# Patient Record
Sex: FEMALE | Race: WHITE | Employment: OTHER | URBAN - METROPOLITAN AREA
[De-identification: names, ages, dates, MRNs, and addresses within clinical notes are randomized per-mention and may not be internally consistent; named-entity substitution may affect disease eponyms.]

---

## 2017-02-21 ENCOUNTER — HOSPITAL ENCOUNTER (OUTPATIENT)
Dept: GENERAL RADIOLOGY | Age: 67
Discharge: HOME | End: 2017-02-21
Payer: MEDICARE

## 2017-02-21 DIAGNOSIS — M48.02 CERVICAL SPINAL STENOSIS: ICD-10-CM

## 2017-02-21 PROBLEM — F40.240 CLAUSTROPHOBIA: Status: ACTIVE | Noted: 2017-02-21

## 2017-02-21 PROCEDURE — 72040 X-RAY EXAM NECK SPINE 2-3 VW: CPT

## 2017-03-09 PROBLEM — Z78.0 POST-MENOPAUSE: Status: ACTIVE | Noted: 2017-03-09

## 2017-03-09 PROBLEM — R73.9 HYPERGLYCEMIA: Status: ACTIVE | Noted: 2017-03-09

## 2017-03-09 PROBLEM — R63.4 WEIGHT LOSS: Status: ACTIVE | Noted: 2017-03-09

## 2017-03-09 PROBLEM — M54.50 CHRONIC LOW BACK PAIN: Status: ACTIVE | Noted: 2017-03-09

## 2017-03-09 PROBLEM — G89.29 CHRONIC LOW BACK PAIN: Status: ACTIVE | Noted: 2017-03-09

## 2017-06-27 PROBLEM — R05.9 COUGH: Status: ACTIVE | Noted: 2017-06-27

## 2017-06-27 PROBLEM — M25.531 ARTHRALGIA OF RIGHT WRIST: Status: ACTIVE | Noted: 2017-06-27

## 2017-08-03 ENCOUNTER — HOSPITAL ENCOUNTER (OUTPATIENT)
Dept: MAMMOGRAPHY | Age: 67
Discharge: HOME | End: 2017-08-03
Attending: INTERNAL MEDICINE
Payer: MEDICARE

## 2017-08-03 DIAGNOSIS — Z78.0 POST-MENOPAUSE: ICD-10-CM

## 2017-08-03 DIAGNOSIS — Z12.31 ENCOUNTER FOR SCREENING MAMMOGRAM FOR MALIGNANT NEOPLASM OF BREAST: ICD-10-CM

## 2017-08-03 PROCEDURE — 77067 SCR MAMMO BI INCL CAD: CPT

## 2017-08-03 PROCEDURE — 77080 DXA BONE DENSITY AXIAL: CPT

## 2017-08-04 PROBLEM — Z79.01 ANTICOAGULANT LONG-TERM USE: Status: ACTIVE | Noted: 2017-08-04

## 2017-09-14 PROBLEM — M79.645 PAIN OF LEFT THUMB: Status: ACTIVE | Noted: 2017-09-14

## 2018-01-22 ENCOUNTER — HOSPITAL ENCOUNTER (OUTPATIENT)
Dept: LAB | Age: 68
Discharge: HOME | End: 2018-01-22
Payer: MEDICARE

## 2018-01-22 DIAGNOSIS — I48.0 PAROXYSMAL ATRIAL FIBRILLATION (HCC): ICD-10-CM

## 2018-01-22 LAB — DIGOXIN SERPL-MCNC: 1.3 NG/ML (ref 0.9–2.1)

## 2018-01-22 PROCEDURE — 80162 ASSAY OF DIGOXIN TOTAL: CPT | Performed by: INTERNAL MEDICINE

## 2018-01-22 PROCEDURE — 36415 COLL VENOUS BLD VENIPUNCTURE: CPT | Performed by: INTERNAL MEDICINE

## 2018-02-05 PROBLEM — N90.4 LICHEN SCLEROSUS ET ATROPHICUS OF THE VULVA: Status: ACTIVE | Noted: 2018-02-05

## 2018-02-14 ENCOUNTER — APPOINTMENT (RX ONLY)
Dept: URBAN - METROPOLITAN AREA CLINIC 349 | Facility: CLINIC | Age: 68
Setting detail: DERMATOLOGY
End: 2018-02-14

## 2018-02-14 DIAGNOSIS — L82.0 INFLAMED SEBORRHEIC KERATOSIS: ICD-10-CM

## 2018-02-14 DIAGNOSIS — D22 MELANOCYTIC NEVI: ICD-10-CM

## 2018-02-14 DIAGNOSIS — L82.1 OTHER SEBORRHEIC KERATOSIS: ICD-10-CM

## 2018-02-14 DIAGNOSIS — Z85.828 PERSONAL HISTORY OF OTHER MALIGNANT NEOPLASM OF SKIN: ICD-10-CM

## 2018-02-14 PROBLEM — C44.612 BASAL CELL CARCINOMA OF SKIN OF RIGHT UPPER LIMB, INCLUDING SHOULDER: Status: ACTIVE | Noted: 2018-02-14

## 2018-02-14 PROBLEM — I48.91 UNSPECIFIED ATRIAL FIBRILLATION: Status: ACTIVE | Noted: 2018-02-14

## 2018-02-14 PROBLEM — D22.72 MELANOCYTIC NEVI OF LEFT LOWER LIMB, INCLUDING HIP: Status: ACTIVE | Noted: 2018-02-14

## 2018-02-14 PROBLEM — L29.8 OTHER PRURITUS: Status: ACTIVE | Noted: 2018-02-14

## 2018-02-14 PROBLEM — D22.61 MELANOCYTIC NEVI OF RIGHT UPPER LIMB, INCLUDING SHOULDER: Status: ACTIVE | Noted: 2018-02-14

## 2018-02-14 PROBLEM — D22.62 MELANOCYTIC NEVI OF LEFT UPPER LIMB, INCLUDING SHOULDER: Status: ACTIVE | Noted: 2018-02-14

## 2018-02-14 PROBLEM — D22.5 MELANOCYTIC NEVI OF TRUNK: Status: ACTIVE | Noted: 2018-02-14

## 2018-02-14 PROBLEM — D22.71 MELANOCYTIC NEVI OF RIGHT LOWER LIMB, INCLUDING HIP: Status: ACTIVE | Noted: 2018-02-14

## 2018-02-14 PROCEDURE — 88305 TISSUE EXAM BY PATHOLOGIST: CPT

## 2018-02-14 PROCEDURE — ? BIOPSY BY SHAVE METHOD

## 2018-02-14 PROCEDURE — 17110 DESTRUCTION B9 LES UP TO 14: CPT

## 2018-02-14 PROCEDURE — ? PATHOLOGY BILLING

## 2018-02-14 PROCEDURE — ? LIQUID NITROGEN

## 2018-02-14 PROCEDURE — A4550 SURGICAL TRAYS: HCPCS

## 2018-02-14 PROCEDURE — 11100: CPT | Mod: 59

## 2018-02-14 PROCEDURE — ? COUNSELING

## 2018-02-14 PROCEDURE — ? BODY PHOTOGRAPHY

## 2018-02-14 PROCEDURE — 99202 OFFICE O/P NEW SF 15 MIN: CPT | Mod: 25

## 2018-02-14 PROCEDURE — ? OTHER

## 2018-02-14 ASSESSMENT — LOCATION ZONE DERM
LOCATION ZONE: LEG
LOCATION ZONE: HAND
LOCATION ZONE: ARM
LOCATION ZONE: FACE
LOCATION ZONE: TRUNK

## 2018-02-14 ASSESSMENT — LOCATION DETAILED DESCRIPTION DERM
LOCATION DETAILED: LEFT SUPERIOR LATERAL MIDBACK
LOCATION DETAILED: RIGHT DISTAL POSTERIOR THIGH
LOCATION DETAILED: LEFT PROXIMAL DORSAL FOREARM
LOCATION DETAILED: LEFT SUPERIOR LATERAL LOWER BACK
LOCATION DETAILED: LEFT DISTAL POSTERIOR THIGH
LOCATION DETAILED: LEFT SUPERIOR MEDIAL UPPER BACK
LOCATION DETAILED: RIGHT MID-UPPER BACK
LOCATION DETAILED: LEFT SUPERIOR MEDIAL LOWER BACK
LOCATION DETAILED: RIGHT CENTRAL TEMPLE
LOCATION DETAILED: RIGHT DISTAL DORSAL FOREARM
LOCATION DETAILED: LEFT MID-UPPER BACK
LOCATION DETAILED: LEFT INFERIOR MEDIAL UPPER BACK
LOCATION DETAILED: LEFT RADIAL DORSAL HAND

## 2018-02-14 ASSESSMENT — LOCATION SIMPLE DESCRIPTION DERM
LOCATION SIMPLE: LEFT POSTERIOR THIGH
LOCATION SIMPLE: LEFT FOREARM
LOCATION SIMPLE: LEFT UPPER BACK
LOCATION SIMPLE: RIGHT TEMPLE
LOCATION SIMPLE: RIGHT FOREARM
LOCATION SIMPLE: LEFT HAND
LOCATION SIMPLE: LEFT LOWER BACK
LOCATION SIMPLE: RIGHT UPPER BACK
LOCATION SIMPLE: RIGHT POSTERIOR THIGH

## 2018-02-14 ASSESSMENT — PAIN INTENSITY VAS: HOW INTENSE IS YOUR PAIN 0 BEING NO PAIN, 10 BEING THE MOST SEVERE PAIN POSSIBLE?: NO PAIN

## 2018-02-14 NOTE — PROCEDURE: BODY PHOTOGRAPHY
Consent: Written consent obtained, risks reviewed for whole body photography. Patient understands that photograph costs may not be covered by insurance, and patient is ultimately responsible for payment.
Was The Entire Body Photographed (Cannot Bill Unless Entire Body Photographed)?: No
Reason For Photography: The patient is obtaining body photography to observe existing suspicious moles and or monitor for the appearance of any new lesions.
Number Of Photographs (Optional- Will Not Render If 0): 1
Detail Level: Detailed
Whole Body Statement: The whole body was photographed today.

## 2018-02-14 NOTE — HPI: NON-MELANOMA SKIN CANCER F/U (HISTORY OF NMSC)
How Many Skin Cancers Have You Had?: one
What Is The Reason For Today's Visit?: History of Non-Melanoma Skin Cancer
Additional History: History provided by patient

## 2018-02-14 NOTE — PROCEDURE: OTHER
Note Text (......Xxx Chief Complaint.): This diagnosis correlates with the
Detail Level: Detailed
Other (Free Text): History provided by patient

## 2018-02-14 NOTE — PROCEDURE: BIOPSY BY SHAVE METHOD
Dressing: bandage
Bill 55911 For Specimen Handling/Conveyance To Laboratory?: no
Biopsy Method: 15 blade- incisional biopsy technique
Hemostasis: Aluminum Chloride
Billing Type: Third-Party Bill
Type Of Destruction Used: Curettage
X Size Of Lesion In Cm: 0
Accession #: [id number]
Additional Anesthesia Volume In Cc (Will Not Render If 0): 1.5
Notification Instructions: Patient will be notified of biopsy results. However, patient instructed to call the office if not contacted within 2 weeks. After the procedure, the patient was oriented to person, place, and time. Patient denied feeling dizzy, queasy, and and declined further observation after initial 5 minute observation time.
Detail Level: Detailed
Cryotherapy Text: The wound bed was treated with cryotherapy after the biopsy was performed.
Silver Nitrate Text: The wound bed was treated with silver nitrate after the biopsy was performed.
Post-Care Instructions: I reviewed with the patient in detail post-care instructions. Patient is to keep the biopsy site dry overnight, and then apply Vaseline  daily until healed. Patient may apply hydrogen peroxide soaks to remove any crusting. After the procedure, the patient was oriented to person, place, and time. Patient denied feeling dizzy, queasy, and and declined further observation after initial 5 minute observation time.
Wound Care: Vaseline
Biopsy Type: H and E
Bill For Surgical Tray: yes
Electrodesiccation And Curettage Text: The wound bed was treated with electrodesiccation and curettage after the biopsy was performed.
Consent: Written consent was obtained and risks were reviewed including but not limited to scarring, infection, bleeding, scabbing, incomplete removal, nerve damage and allergy to anesthesia.
Electrodesiccation Text: The wound bed was treated with electrodesiccation after the biopsy was performed.
Curettage Text: The wound bed was treated with curettage after the biopsy was performed.
Anesthesia Volume In Cc (Will Not Render If 0): 0.5
Anesthesia Type: 2% lidocaine with epinephrine and a 1:10 solution of 8.4% sodium bicarbonate

## 2018-02-14 NOTE — PROCEDURE: LIQUID NITROGEN
Add 52 Modifier (Optional): no
Medical Necessity Information: It is in your best interest to select a reason for this procedure from the list below. All of these items fulfill various CMS LCD requirements except the new and changing color options.
Detail Level: Detailed
Render Post-Care Instructions In Note?: yes
Post-Care Instructions: I reviewed with the patient in detail post-care instructions. Patient is to wear sunprotection, and avoid picking at any of the treated lesions. Pt may apply Vaseline to crusted or scabbing areas.
Medical Necessity Clause: This procedure was medically necessary because the lesions that were treated were:
Consent: The patient's consent was obtained including but not limited to risks of crusting, scabbing, blistering, scarring, darker or lighter pigmentary change, recurrence, incomplete removal and infection.

## 2018-03-01 ENCOUNTER — APPOINTMENT (RX ONLY)
Dept: URBAN - METROPOLITAN AREA CLINIC 349 | Facility: CLINIC | Age: 68
Setting detail: DERMATOLOGY
End: 2018-03-01

## 2018-03-01 PROBLEM — C44.612 BASAL CELL CARCINOMA OF SKIN OF RIGHT UPPER LIMB, INCLUDING SHOULDER: Status: ACTIVE | Noted: 2018-03-01

## 2018-03-01 PROBLEM — K21.9 GASTRO-ESOPHAGEAL REFLUX DISEASE WITHOUT ESOPHAGITIS: Status: ACTIVE | Noted: 2018-03-01

## 2018-03-01 PROBLEM — L85.3 XEROSIS CUTIS: Status: ACTIVE | Noted: 2018-03-01

## 2018-03-01 PROCEDURE — A4550 SURGICAL TRAYS: HCPCS

## 2018-03-01 PROCEDURE — 17262 DSTRJ MAL LES T/A/L 1.1-2.0: CPT

## 2018-03-01 PROCEDURE — ? CURETTAGE AND DESTRUCTION

## 2018-03-01 PROCEDURE — ? COUNSELING

## 2018-03-12 PROBLEM — C44.602 SKIN CANCER OF ARM, RIGHT: Status: ACTIVE | Noted: 2018-03-12

## 2018-03-12 PROBLEM — E78.2 ELEVATED TRIGLYCERIDES WITH HIGH CHOLESTEROL: Status: ACTIVE | Noted: 2018-03-12

## 2018-04-24 ENCOUNTER — HOSPITAL ENCOUNTER (OUTPATIENT)
Dept: GENERAL RADIOLOGY | Age: 68
Discharge: HOME | End: 2018-04-24
Payer: MEDICARE

## 2018-04-24 DIAGNOSIS — M54.2 NECK PAIN: ICD-10-CM

## 2018-04-24 PROBLEM — E66.01 SEVERE OBESITY (BMI 35.0-39.9) WITH COMORBIDITY (HCC): Status: ACTIVE | Noted: 2018-04-24

## 2018-04-24 PROCEDURE — 72040 X-RAY EXAM NECK SPINE 2-3 VW: CPT

## 2018-07-11 PROBLEM — H92.01 RIGHT EAR PAIN: Status: ACTIVE | Noted: 2018-07-11

## 2018-07-11 PROBLEM — R32 URINARY INCONTINENCE: Status: ACTIVE | Noted: 2018-07-11

## 2018-07-11 PROBLEM — E55.9 VITAMIN D DEFICIENCY: Status: ACTIVE | Noted: 2018-07-11

## 2018-07-11 PROBLEM — R31.9 HEMATURIA: Status: ACTIVE | Noted: 2018-07-11

## 2018-07-11 PROBLEM — R53.83 OTHER FATIGUE: Status: ACTIVE | Noted: 2018-07-11

## 2018-09-13 ENCOUNTER — HOSPITAL ENCOUNTER (OUTPATIENT)
Dept: MAMMOGRAPHY | Age: 68
Discharge: HOME | End: 2018-09-13
Attending: INTERNAL MEDICINE
Payer: MEDICARE

## 2018-09-13 DIAGNOSIS — Z12.31 VISIT FOR SCREENING MAMMOGRAM: ICD-10-CM

## 2018-09-13 PROCEDURE — 77067 SCR MAMMO BI INCL CAD: CPT

## 2018-09-14 PROBLEM — N32.81 OAB (OVERACTIVE BLADDER): Status: ACTIVE | Noted: 2018-09-14

## 2018-09-14 PROBLEM — N95.2 VAGINAL ATROPHY: Status: ACTIVE | Noted: 2018-09-14

## 2018-09-14 PROBLEM — R10.2 VAGINAL PAIN: Status: ACTIVE | Noted: 2018-09-14

## 2018-10-29 PROBLEM — M79.604 RIGHT LEG PAIN: Status: ACTIVE | Noted: 2018-10-29

## 2018-11-16 PROBLEM — M54.16 ACUTE LUMBAR RADICULOPATHY: Status: ACTIVE | Noted: 2018-11-16

## 2018-11-16 PROBLEM — Z23 FLU VACCINE NEED: Status: ACTIVE | Noted: 2018-11-16

## 2019-04-24 PROBLEM — M62.838 MUSCLE SPASM: Status: ACTIVE | Noted: 2019-04-24

## 2019-08-19 PROBLEM — M25.561 ACUTE PAIN OF RIGHT KNEE: Status: ACTIVE | Noted: 2019-08-19

## 2019-08-19 PROBLEM — M25.511 CHRONIC RIGHT SHOULDER PAIN: Status: ACTIVE | Noted: 2019-08-19

## 2019-08-19 PROBLEM — G89.29 CHRONIC RIGHT SHOULDER PAIN: Status: ACTIVE | Noted: 2019-08-19

## 2019-08-20 ENCOUNTER — HOSPITAL ENCOUNTER (OUTPATIENT)
Dept: GENERAL RADIOLOGY | Age: 69
Discharge: HOME | End: 2019-08-20
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
## 2019-08-20 DIAGNOSIS — R52 PAIN: ICD-10-CM

## 2019-08-20 DIAGNOSIS — T14.90XA INJURY: ICD-10-CM

## 2019-09-23 PROBLEM — Z23 FLU VACCINE NEED: Status: RESOLVED | Noted: 2018-11-16 | Resolved: 2019-09-23

## 2019-09-30 ENCOUNTER — HOSPITAL ENCOUNTER (OUTPATIENT)
Dept: MAMMOGRAPHY | Age: 69
Discharge: HOME | End: 2019-09-30
Attending: OBSTETRICS & GYNECOLOGY
Payer: MEDICARE

## 2019-09-30 DIAGNOSIS — N64.52 BLOODY DISCHARGE FROM RIGHT NIPPLE: ICD-10-CM

## 2019-09-30 PROCEDURE — 77066 DX MAMMO INCL CAD BI: CPT

## 2019-09-30 PROCEDURE — 76642 ULTRASOUND BREAST LIMITED: CPT

## 2019-10-18 ENCOUNTER — HOSPITAL ENCOUNTER (OUTPATIENT)
Dept: MRI IMAGING | Age: 69
Discharge: HOME | End: 2019-10-18
Attending: OBSTETRICS & GYNECOLOGY
Payer: MEDICARE

## 2019-10-18 DIAGNOSIS — N64.52 BLOODY DISCHARGE FROM RIGHT NIPPLE: ICD-10-CM

## 2019-10-18 PROCEDURE — 77049 MRI BREAST C-+ W/CAD BI: CPT

## 2019-10-18 PROCEDURE — 74011000258 HC RX REV CODE- 258: Performed by: OBSTETRICS & GYNECOLOGY

## 2019-10-18 PROCEDURE — 74011250636 HC RX REV CODE- 250/636: Performed by: OBSTETRICS & GYNECOLOGY

## 2019-10-18 PROCEDURE — A9575 INJ GADOTERATE MEGLUMI 0.1ML: HCPCS | Performed by: OBSTETRICS & GYNECOLOGY

## 2019-10-18 RX ORDER — GADOTERATE MEGLUMINE 376.9 MG/ML
21 INJECTION INTRAVENOUS
Status: COMPLETED | OUTPATIENT
Start: 2019-10-18 | End: 2019-10-18

## 2019-10-18 RX ORDER — SODIUM CHLORIDE 0.9 % (FLUSH) 0.9 %
10 SYRINGE (ML) INJECTION
Status: COMPLETED | OUTPATIENT
Start: 2019-10-18 | End: 2019-10-18

## 2019-10-18 RX ADMIN — Medication 10 ML: at 12:36

## 2019-10-18 RX ADMIN — GADOTERATE MEGLUMINE 21 ML: 376.9 INJECTION INTRAVENOUS at 12:36

## 2019-10-18 RX ADMIN — SODIUM CHLORIDE 100 ML: 900 INJECTION, SOLUTION INTRAVENOUS at 12:36

## 2019-11-07 PROBLEM — K22.4 ESOPHAGEAL SPASM: Status: ACTIVE | Noted: 2019-11-07

## 2019-12-30 ENCOUNTER — HOSPITAL ENCOUNTER (OUTPATIENT)
Dept: LAB | Age: 69
Discharge: HOME | End: 2019-12-30
Attending: INTERNAL MEDICINE
Payer: MEDICARE

## 2019-12-30 DIAGNOSIS — R07.89 OTHER CHEST PAIN: ICD-10-CM

## 2019-12-30 DIAGNOSIS — Z79.01 ANTICOAGULANT LONG-TERM USE: ICD-10-CM

## 2019-12-30 LAB
ANION GAP SERPL CALC-SCNC: 3 MMOL/L (ref 7–16)
BASOPHILS # BLD: 0.1 K/UL (ref 0–0.2)
BASOPHILS NFR BLD: 1 % (ref 0–2)
BUN SERPL-MCNC: 17 MG/DL (ref 8–23)
CALCIUM SERPL-MCNC: 8.8 MG/DL (ref 8.3–10.4)
CHLORIDE SERPL-SCNC: 108 MMOL/L (ref 98–107)
CO2 SERPL-SCNC: 32 MMOL/L (ref 21–32)
CREAT SERPL-MCNC: 0.9 MG/DL (ref 0.6–1)
DIFFERENTIAL METHOD BLD: NORMAL
EOSINOPHIL # BLD: 0.2 K/UL (ref 0–0.8)
EOSINOPHIL NFR BLD: 4 % (ref 0.5–7.8)
ERYTHROCYTE [DISTWIDTH] IN BLOOD BY AUTOMATED COUNT: 12.8 % (ref 11.9–14.6)
GLUCOSE SERPL-MCNC: 118 MG/DL (ref 65–100)
HCT VFR BLD AUTO: 45.2 % (ref 35.8–46.3)
HGB BLD-MCNC: 14.8 G/DL (ref 11.7–15.4)
IMM GRANULOCYTES # BLD AUTO: 0 K/UL (ref 0–0.5)
IMM GRANULOCYTES NFR BLD AUTO: 1 % (ref 0–5)
LYMPHOCYTES # BLD: 1.4 K/UL (ref 0.5–4.6)
LYMPHOCYTES NFR BLD: 26 % (ref 13–44)
MCH RBC QN AUTO: 30.2 PG (ref 26.1–32.9)
MCHC RBC AUTO-ENTMCNC: 32.7 G/DL (ref 31.4–35)
MCV RBC AUTO: 92.2 FL (ref 79.6–97.8)
MONOCYTES # BLD: 0.3 K/UL (ref 0.1–1.3)
MONOCYTES NFR BLD: 6 % (ref 4–12)
NEUTS SEG # BLD: 3.3 K/UL (ref 1.7–8.2)
NEUTS SEG NFR BLD: 62 % (ref 43–78)
NRBC # BLD: 0 K/UL (ref 0–0.2)
PLATELET # BLD AUTO: 188 K/UL (ref 150–450)
PMV BLD AUTO: 10.7 FL (ref 9.4–12.3)
POTASSIUM SERPL-SCNC: 4.2 MMOL/L (ref 3.5–5.1)
RBC # BLD AUTO: 4.9 M/UL (ref 4.05–5.2)
SODIUM SERPL-SCNC: 143 MMOL/L (ref 136–145)
WBC # BLD AUTO: 5.4 K/UL (ref 4.3–11.1)

## 2019-12-30 PROCEDURE — 36415 COLL VENOUS BLD VENIPUNCTURE: CPT

## 2019-12-30 PROCEDURE — 80048 BASIC METABOLIC PNL TOTAL CA: CPT

## 2019-12-30 PROCEDURE — 85025 COMPLETE CBC W/AUTO DIFF WBC: CPT

## 2020-01-07 RX ORDER — ASPIRIN 81 MG/1
81 TABLET ORAL DAILY
COMMUNITY
End: 2020-05-12

## 2020-01-07 NOTE — PROGRESS NOTES
Called to pre-assess for [facility] poss with Dr Ross Law , Scheduled 1/8/20. No answer & message left.

## 2020-01-07 NOTE — PROGRESS NOTES
Patient pre-assessment complete for [facility] poss with Dr Maryam Singh scheduled for 20 at 8am, arrival time 6am. Patient verified using . Patient instructed to bring all home medications in labeled bottles on the day of procedure. NPO status reinforced. Patient informed to take a full dose aspirin 325mg  or 81 mg x 4 on the day of procedure. Patient instructed to HOLD eliquis (last dose 20). Instructed they can take all other medications excluding vitamins & supplements. Patient verbalizes understanding of all instructions & denies any questions at this time.

## 2020-01-08 ENCOUNTER — HOSPITAL ENCOUNTER (OUTPATIENT)
Dept: CARDIAC CATH/INVASIVE PROCEDURES | Age: 70
Discharge: HOME | End: 2020-01-08
Attending: INTERNAL MEDICINE | Admitting: INTERNAL MEDICINE
Payer: MEDICARE

## 2020-01-08 VITALS
HEART RATE: 58 BPM | BODY MASS INDEX: 37.04 KG/M2 | SYSTOLIC BLOOD PRESSURE: 127 MMHG | WEIGHT: 236 LBS | DIASTOLIC BLOOD PRESSURE: 63 MMHG | RESPIRATION RATE: 18 BRPM | HEIGHT: 67 IN | OXYGEN SATURATION: 95 %

## 2020-01-08 LAB
ATRIAL RATE: 57 BPM
CALCULATED P AXIS, ECG09: 46 DEGREES
CALCULATED R AXIS, ECG10: 36 DEGREES
CALCULATED T AXIS, ECG11: 57 DEGREES
DIAGNOSIS, 93000: NORMAL
P-R INTERVAL, ECG05: 162 MS
Q-T INTERVAL, ECG07: 414 MS
QRS DURATION, ECG06: 98 MS
QTC CALCULATION (BEZET), ECG08: 402 MS
VENTRICULAR RATE, ECG03: 57 BPM

## 2020-01-08 PROCEDURE — 93005 ELECTROCARDIOGRAM TRACING: CPT | Performed by: INTERNAL MEDICINE

## 2020-01-08 PROCEDURE — 74011636320 HC RX REV CODE- 636/320: Performed by: INTERNAL MEDICINE

## 2020-01-08 PROCEDURE — 99152 MOD SED SAME PHYS/QHP 5/>YRS: CPT

## 2020-01-08 PROCEDURE — C1894 INTRO/SHEATH, NON-LASER: HCPCS

## 2020-01-08 PROCEDURE — 74011250636 HC RX REV CODE- 250/636: Performed by: INTERNAL MEDICINE

## 2020-01-08 PROCEDURE — 74011000250 HC RX REV CODE- 250: Performed by: INTERNAL MEDICINE

## 2020-01-08 PROCEDURE — 77030029997 HC DEV COM RDL R BND TELE -B

## 2020-01-08 PROCEDURE — C1769 GUIDE WIRE: HCPCS

## 2020-01-08 PROCEDURE — 77030015766

## 2020-01-08 PROCEDURE — 77030016699 HC CATH ANGI DX INFN1 CARD -A

## 2020-01-08 PROCEDURE — 93458 L HRT ARTERY/VENTRICLE ANGIO: CPT

## 2020-01-08 RX ORDER — HEPARIN SODIUM 200 [USP'U]/100ML
3 INJECTION, SOLUTION INTRAVENOUS CONTINUOUS
Status: DISCONTINUED | OUTPATIENT
Start: 2020-01-08 | End: 2020-01-08 | Stop reason: HOSPADM

## 2020-01-08 RX ORDER — GUAIFENESIN 100 MG/5ML
81-324 LIQUID (ML) ORAL ONCE
Status: DISCONTINUED | OUTPATIENT
Start: 2020-01-08 | End: 2020-01-08 | Stop reason: HOSPADM

## 2020-01-08 RX ORDER — FENTANYL CITRATE 50 UG/ML
25-100 INJECTION, SOLUTION INTRAMUSCULAR; INTRAVENOUS
Status: DISCONTINUED | OUTPATIENT
Start: 2020-01-08 | End: 2020-01-08 | Stop reason: HOSPADM

## 2020-01-08 RX ORDER — LIDOCAINE HYDROCHLORIDE 10 MG/ML
5-40 INJECTION INFILTRATION; PERINEURAL
Status: DISCONTINUED | OUTPATIENT
Start: 2020-01-08 | End: 2020-01-08 | Stop reason: HOSPADM

## 2020-01-08 RX ORDER — MIDAZOLAM HYDROCHLORIDE 1 MG/ML
.5-5 INJECTION, SOLUTION INTRAMUSCULAR; INTRAVENOUS
Status: DISCONTINUED | OUTPATIENT
Start: 2020-01-08 | End: 2020-01-08 | Stop reason: HOSPADM

## 2020-01-08 RX ORDER — LIDOCAINE HYDROCHLORIDE 10 MG/ML
1-30 INJECTION, SOLUTION EPIDURAL; INFILTRATION; INTRACAUDAL; PERINEURAL ONCE
Status: COMPLETED | OUTPATIENT
Start: 2020-01-08 | End: 2020-01-08

## 2020-01-08 RX ORDER — SODIUM CHLORIDE 9 MG/ML
75 INJECTION, SOLUTION INTRAVENOUS CONTINUOUS
Status: DISCONTINUED | OUTPATIENT
Start: 2020-01-08 | End: 2020-01-08 | Stop reason: HOSPADM

## 2020-01-08 RX ADMIN — MIDAZOLAM 2 MG: 1 INJECTION INTRAMUSCULAR; INTRAVENOUS at 08:21

## 2020-01-08 RX ADMIN — HEPARIN SODIUM 2 ML: 10000 INJECTION INTRAVENOUS; SUBCUTANEOUS at 08:31

## 2020-01-08 RX ADMIN — FENTANYL CITRATE 50 MCG: 50 INJECTION, SOLUTION INTRAMUSCULAR; INTRAVENOUS at 08:21

## 2020-01-08 RX ADMIN — IOPAMIDOL 65 ML: 755 INJECTION, SOLUTION INTRAVENOUS at 08:32

## 2020-01-08 RX ADMIN — HEPARIN SODIUM 3 ML/HR: 5000 INJECTION, SOLUTION INTRAVENOUS; SUBCUTANEOUS at 08:32

## 2020-01-08 RX ADMIN — LIDOCAINE HYDROCHLORIDE 3 ML: 10 INJECTION, SOLUTION EPIDURAL; INFILTRATION; INTRACAUDAL; PERINEURAL at 08:37

## 2020-01-08 NOTE — PROGRESS NOTES
Report received from [address]. Procedural findings communicated. Intra procedural  medication administration reviewed. Progression of care discussed.      Patient received into CPRU [facility] 4 post sheath removal.     Right Radial access site without bleeding or swelling     TR band dry and intact     Patient instructed to limit movement to right upper extremity    Routine post procedural vital signs and site assessment initiated

## 2020-01-08 NOTE — DISCHARGE INSTRUCTIONS

## 2020-01-08 NOTE — PROGRESS NOTES
Patient received to [address] [place] [address] room # 11  Ambulatory from [facility]. Patient scheduled for [facility] today with Dr Lonnie Blake. Procedure reviewed & questions answered, voiced good understanding consent obtained & placed on chart. All medications and medical history reviewed. Will prep patient per orders. Patient & family updated on plan of care. The patient has a fraility score of 3-MANAGING WELL, based on patient A&Ox3, patient able to ambulate to room without difficulty.

## 2020-01-08 NOTE — PROCEDURES
Brief Cardiac Procedure Note    Patient: Tyrel Arceo MRN: [id number]  SSN: [id number]    YOB: 1950  Age: 78 y.o. Sex: female      Date of Procedure: 1/8/2020     Pre-procedure Diagnosis: Atypical Angina    Post-procedure Diagnosis: Non-cardiac Chest Pain    Procedure: Left Heart Catheterization    Brief Description of Procedure: See note    Performed By: Sangeetha Rodriguez MD     Assistants: None    Anesthesia: Moderate Sedation    Estimated Blood Loss: Less than 10 mL      Specimens: None    Implants: None    Findings:   LV:  EF 65%  LM:  NML  LAD:  NML  LCx:  NML  RCA:  NML    Complications: None    Recommendations: Continue medical therapy.     Signed By: Sangeetha Rodriguez MD     January 8, 2020

## 2020-01-08 NOTE — PROCEDURES
[facility]  CARDIAC CATH    Name:  Daina Lama  MR#:  [id number]  :  1950  ACCOUNT #:  [de-identified]  DATE OF SERVICE:  2020    PRIMARY CARDIOLOGIST:  Juan Pablo Parekh MD    PRIMARY CARE PHYSICIAN:  Akanksha Butler MD    BRIEF HISTORY:  The patient is a very pleasant 21-year-old female with chronic recurrent chest discomfort. Given the recurrent symptoms despite negative stress testing, she is referred for cardiac catheterization. PREOPERATIVE DIAGNOSIS:  Atypical recurrent chest pain. POSTOPERATIVE DIAGNOSIS:  Noncardiac chest pain. PROCEDURES PERFORMED:  Bilateral selective coronary angiography, left ventriculography. SURGEON:  Karl Patel MD    ASSISTANT:  None. COMPLICATIONS:  None. ANESTHESIA:  Conscious sedation. Start time 8:21, end time 8:33. MEDICATIONS:  2 mg of Versed and 50 mcg of fentanyl    MONITORING RN:  Sylvia Flor    ESTIMATED BLOOD LOSS:  5cc    SPECIMENS REMOVED:  None    IMPLANTS:  None    PROCEDURE:  After informed consent, she was prepped and draped in usual sterile fashion. The right wrist was infiltrated with lidocaine. The right radial artery was accessed. A 6-Emirati sheath was advanced. A 5-Emirati Tiger catheter was utilized for left and right coronary artery injections and a 5-Emirati angled pigtail for left ventriculography. Isovue contrast utilized. FINDINGS:  1. Left ventricle:  Normal left ventricular size. Normal left ventricular systolic function. Ejection fraction 65%. There is no significant mitral regurgitation. No aortic valve gradient. Left ventricular end-diastolic pressure measures 7 mmHg. 2.  Left main:  Left main is large. It bifurcates into LAD and circumflex. System appears angiographically normal.  3.  Left anterior descending coronary artery: It is a large vessel. There is mild calcification within the mid vessel.   There is a large bifurcating diagonal.  The entire LAD diagonal system appeared to have no angiographic stenosis. 4.  Left circumflex coronary artery: It is a moderate-sized vessel. Conduit to a single large obtuse marginal.  The entire system appears angiographically normal.  5.  Right coronary artery: It is a moderate-sized anatomically dominant vessel. It gives rise to a small to moderate-sized posterior descending and small posterolateral branch. The entire system appears angiographically normal.    CONCLUSION:  1. Normal left ventricular systolic function. 2.  Minimal atherosclerotic coronary artery disease. Thank you for allowing us to participate in the care of this patient. For any questions or concerns, please feel free to contact me.       Ava Abraham MD      [id number]  D:  01/08/2020 8:46  T:  01/08/2020 9:23  JOB #:  [id number]  CC:  Ernestina Leiter, MD Arby Dance, MD

## 2020-01-08 NOTE — PROGRESS NOTES
TRANSFER - OUT REPORT:    Verbal report given to Brent Sexton RN(name) on Yuriy All  being transferred to cpru(unit) for routine progression of care       Report consisted of patients Situation, Background, Assessment and   Recommendations(SBAR). Information from the following report(s) SBAR was reviewed with the receiving nurse. is allergic to codeine and norco [hydrocodone-acetaminophen]. Opportunity for questions and clarification was provided. Procedure Summary:Pt had LHC via R wrist, site sealed with R band using 12 ml at 0830 hrs.     Med Administration    Versed:  2 mg  Fentanyl: 50 mcg    Visit Vitals  /61 (BP 1 Location: Left arm, BP Patient Position: Supine)   Pulse 60   Resp 18   Ht 5' 7\" (1.702 m)   Wt 107 kg (236 lb)   SpO2 98%   Breastfeeding No   BMI 36.96 kg/m²     Past Medical History:   Diagnosis Date    Acute cervical radiculopathy     Arthralgia of left shoulder region     Arthritis     fingers    Cervicalgia     Chest pain     Chronic pain     left shoulder, sciatica right hip    Claustrophobia 2/21/2017    Coronary disease     Depression     controlled with sertraline daily    Displacement of cervical intervertebral disc without myelopathy     Exogenous obesity     Extremity pain     Fibrocystic breast disease     GERD (gastroesophageal reflux disease)     controlled with omeprazole daily    History of Helicobacter pylori infection 9/2014    treated with 2 different antibiotics and PPI    HNP (herniated nucleus pulposus), lumbar     Lumbar radiculopathy     Nausea & vomiting     Neck sprain     & STRAIN    Obesity (BMI 30-39.9) 12/5/14    BMI 35.9    Overactive bladder     Paroxysmal atrial fibrillation ([facility] [address]     Spinal stenosis, cervical region            Peripheral IV 01/08/20 Anterior;Proximal;Right Forearm (Active)       Peripheral IV 01/08/20 Anterior;Left;Proximal Forearm (Active)

## 2020-02-27 PROBLEM — J06.9 URI (UPPER RESPIRATORY INFECTION): Status: ACTIVE | Noted: 2020-02-27

## 2020-03-25 NOTE — PROCEDURE: CURETTAGE AND DESTRUCTION
Number Of Curettages: 3
Add Ability To Document Additional Intralesional Injection: No
Bill As A Line Item Or As Units: Line Item
Cautery Type: electrodesiccation
Consent was obtained from the patient. The risks, benefits and alternatives to therapy were discussed in detail. Specifically, the risks of infection, scarring, bleeding, prolonged wound healing, nerve injury, incomplete removal, allergy to anesthesia and recurrence were addressed. Alternatives to ED&C, such as: surgical removal and XRT were also discussed.  Prior to the procedure, the treatment site was clearly identified and confirmed by the patient. All components of Universal Protocol/PAUSE Rule completed.
Detail Level: Detailed
Size Of Lesion After Curettage: 1.5
Additional Information: (Optional): The wound was cleaned, and a pressure dressing was applied.  The patient received detailed post-op instructions.
Anesthesia Type: 1% lidocaine with 1:100,000 epinephrine and a 1:10 solution of 8.4% sodium bicarbonate
Total Volume (Ccs): 1
Medication Injected: 5-Fluorouracil
no
Bill For Surgical Tray: yes
Post-Care Instructions: I reviewed with the patient in detail post-care instructions. Patient is to keep the area dry for 48 hours, and not to engage in any swimming until the area is healed. Should the patient develop any fevers, chills, bleeding, severe pain patient will contact the office immediately.
What Was Performed First?: Curettage

## 2020-10-12 ENCOUNTER — TRANSCRIBE ORDER (OUTPATIENT)
Dept: SCHEDULING | Age: 70
End: 2020-10-12

## 2020-10-12 DIAGNOSIS — Z12.31 SCREENING MAMMOGRAM, ENCOUNTER FOR: Primary | ICD-10-CM

## 2020-10-21 ENCOUNTER — HOSPITAL ENCOUNTER (OUTPATIENT)
Dept: MAMMOGRAPHY | Age: 70
Discharge: HOME | End: 2020-10-21
Attending: NURSE PRACTITIONER

## 2020-10-21 DIAGNOSIS — Z12.31 SCREENING MAMMOGRAM, ENCOUNTER FOR: ICD-10-CM

## 2020-11-01 PROBLEM — Z98.890 HISTORY OF COLONOSCOPY: Status: ACTIVE | Noted: 2017-01-01

## 2020-12-02 PROBLEM — Z79.899 LONG TERM CURRENT USE OF ANTIARRHYTHMIC DRUG: Status: ACTIVE | Noted: 2020-12-02

## 2020-12-02 PROBLEM — E66.9 OBESITY (BMI 30-39.9): Status: ACTIVE | Noted: 2020-12-02

## 2021-01-25 ENCOUNTER — TELEPHONE (OUTPATIENT)
Dept: CASE MANAGEMENT | Age: 71
End: 2021-01-25

## 2021-01-25 NOTE — TELEPHONE ENCOUNTER
Called patient in reference to her pharmacy unable to get lidocaine lotion- okay for lidocaine cream per Dr Funmi Mohamud- order sent to pharmacy however patient states they are unable to get either cream or lotion. I called Grady Porras at [facility] and he thinks he will be able to get product for patient. Script sent to them. I notified patient and gave her the retail pharmacy number so she could call prior to coming to  to ensure they were able to order cream and get it in for her. She stated understanding.

## 2021-01-27 ENCOUNTER — HOSPITAL ENCOUNTER (OUTPATIENT)
Dept: LAB | Age: 71
Discharge: HOME | End: 2021-01-27
Payer: MEDICARE

## 2021-01-27 DIAGNOSIS — N76.4 VULVAR ABSCESS: ICD-10-CM

## 2021-01-27 LAB
REFERENCE LAB,REFLB: NORMAL
REFERENCE LAB,REFLB: NORMAL
TEST DESCRIPTION:,ATST: NORMAL
TEST DESCRIPTION:,ATST: NORMAL

## 2021-01-27 PROCEDURE — 88305 TISSUE EXAM BY PATHOLOGIST: CPT

## 2021-06-24 PROBLEM — I25.84 CALCIFICATION OF CORONARY ARTERY: Status: ACTIVE | Noted: 2021-06-24

## 2021-06-24 PROBLEM — I25.10 CALCIFICATION OF CORONARY ARTERY: Status: ACTIVE | Noted: 2021-06-24

## 2021-11-09 ENCOUNTER — TRANSCRIBE ORDER (OUTPATIENT)
Dept: SCHEDULING | Age: 71
End: 2021-11-09

## 2021-11-09 DIAGNOSIS — N64.4 BREAST PAIN, LEFT: Primary | ICD-10-CM

## 2021-11-09 DIAGNOSIS — N64.52 NIPPLE DISCHARGE IN FEMALE: ICD-10-CM

## 2021-11-18 ENCOUNTER — HOSPITAL ENCOUNTER (OUTPATIENT)
Dept: MAMMOGRAPHY | Age: 71
Discharge: HOME | End: 2021-11-18
Payer: MEDICARE

## 2021-11-18 ENCOUNTER — HOSPITAL ENCOUNTER (OUTPATIENT)
Dept: MAMMOGRAPHY | Age: 71
Discharge: HOME | End: 2021-11-18
Attending: NURSE PRACTITIONER
Payer: MEDICARE

## 2021-11-18 DIAGNOSIS — N64.52 NIPPLE DISCHARGE IN FEMALE: ICD-10-CM

## 2021-11-18 DIAGNOSIS — N64.4 BREAST PAIN, LEFT: ICD-10-CM

## 2021-11-18 PROCEDURE — 76642 ULTRASOUND BREAST LIMITED: CPT

## 2021-11-18 PROCEDURE — 77066 DX MAMMO INCL CAD BI: CPT

## 2021-12-30 PROBLEM — I34.0 MILD MITRAL REGURGITATION BY PRIOR ECHOCARDIOGRAM: Status: ACTIVE | Noted: 2021-12-30

## 2022-03-16 ENCOUNTER — ANESTHESIA EVENT (OUTPATIENT)
Dept: SURGERY | Age: 72
End: 2022-03-16
Payer: MEDICARE

## 2022-03-17 ENCOUNTER — HOSPITAL ENCOUNTER (OUTPATIENT)
Age: 72
Setting detail: OUTPATIENT SURGERY
Discharge: HOME | End: 2022-03-17
Attending: UROLOGY | Admitting: UROLOGY
Payer: MEDICARE

## 2022-03-17 ENCOUNTER — APPOINTMENT (OUTPATIENT)
Dept: GENERAL RADIOLOGY | Age: 72
End: 2022-03-17
Attending: UROLOGY
Payer: MEDICARE

## 2022-03-17 ENCOUNTER — ANESTHESIA (OUTPATIENT)
Dept: SURGERY | Age: 72
End: 2022-03-17
Payer: MEDICARE

## 2022-03-17 VITALS
BODY MASS INDEX: 40.81 KG/M2 | HEART RATE: 48 BPM | TEMPERATURE: 97.8 F | SYSTOLIC BLOOD PRESSURE: 112 MMHG | OXYGEN SATURATION: 90 % | HEIGHT: 67 IN | DIASTOLIC BLOOD PRESSURE: 63 MMHG | RESPIRATION RATE: 16 BRPM | WEIGHT: 260 LBS

## 2022-03-17 DIAGNOSIS — N39.41 URGE URINARY INCONTINENCE: ICD-10-CM

## 2022-03-17 PROCEDURE — 76060000034 HC ANESTHESIA 1.5 TO 2 HR: Performed by: UROLOGY

## 2022-03-17 PROCEDURE — 76210000021 HC REC RM PH II 0.5 TO 1 HR: Performed by: UROLOGY

## 2022-03-17 PROCEDURE — 74011000272 HC RX REV CODE- 272: Performed by: UROLOGY

## 2022-03-17 PROCEDURE — 74011000258 HC RX REV CODE- 258: Performed by: NURSE ANESTHETIST, CERTIFIED REGISTERED

## 2022-03-17 PROCEDURE — 76210000006 HC OR PH I REC 0.5 TO 1 HR: Performed by: UROLOGY

## 2022-03-17 PROCEDURE — 77030002933 HC SUT MCRYL J&J -A: Performed by: UROLOGY

## 2022-03-17 PROCEDURE — 77030036615 HC IMPL ENV ANTIBACT MEDT -G: Performed by: UROLOGY

## 2022-03-17 PROCEDURE — 2709999900 HC NON-CHARGEABLE SUPPLY: Performed by: UROLOGY

## 2022-03-17 PROCEDURE — 74011000250 HC RX REV CODE- 250: Performed by: ANESTHESIOLOGY

## 2022-03-17 PROCEDURE — 77030002966 HC SUT PDS J&J -A: Performed by: UROLOGY

## 2022-03-17 PROCEDURE — 77030020271 HC MISC IMPL NEURO: Performed by: UROLOGY

## 2022-03-17 PROCEDURE — 74011250636 HC RX REV CODE- 250/636: Performed by: NURSE ANESTHETIST, CERTIFIED REGISTERED

## 2022-03-17 PROCEDURE — 74011000250 HC RX REV CODE- 250: Performed by: NURSE ANESTHETIST, CERTIFIED REGISTERED

## 2022-03-17 PROCEDURE — 64581 OPN IMPLTJ NEA SACRAL NERVE: CPT | Performed by: UROLOGY

## 2022-03-17 PROCEDURE — 77030002986 HC SUT PROL J&J -A: Performed by: UROLOGY

## 2022-03-17 PROCEDURE — 77030010507 HC ADH SKN DERMBND J&J -B: Performed by: UROLOGY

## 2022-03-17 PROCEDURE — 76010000153 HC OR TIME 1.5 TO 2 HR: Performed by: UROLOGY

## 2022-03-17 PROCEDURE — 77030002888 HC SUT CHRMC J&J -A: Performed by: UROLOGY

## 2022-03-17 PROCEDURE — 77030035549 HC NEUROSTIM EXT VERIFY DISP MEDT -E: Performed by: UROLOGY

## 2022-03-17 PROCEDURE — 64590 INS/RPL PRPH SAC/GSTR NPG/R: CPT | Performed by: UROLOGY

## 2022-03-17 PROCEDURE — 74011250636 HC RX REV CODE- 250/636: Performed by: ANESTHESIOLOGY

## 2022-03-17 PROCEDURE — 76000 FLUOROSCOPY <1 HR PHYS/QHP: CPT | Performed by: UROLOGY

## 2022-03-17 PROCEDURE — 74011250636 HC RX REV CODE- 250/636: Performed by: UROLOGY

## 2022-03-17 PROCEDURE — 95972 ALYS CPLX SP/PN NPGT W/PRGRM: CPT | Performed by: UROLOGY

## 2022-03-17 PROCEDURE — 74011000258 HC RX REV CODE- 258: Performed by: UROLOGY

## 2022-03-17 PROCEDURE — 74011000250 HC RX REV CODE- 250: Performed by: UROLOGY

## 2022-03-17 PROCEDURE — C1778 LEAD, NEUROSTIMULATOR: HCPCS | Performed by: UROLOGY

## 2022-03-17 DEVICE — NEUROSTIMULATOR INTERSTIM X RECHRGE FREE TORQ WRNCH PROD: Type: IMPLANTABLE DEVICE | Site: HIP | Status: FUNCTIONAL

## 2022-03-17 DEVICE — ENVELOPE PLSE GENRTR M W2.7XL2.5IN NEURO ANTIBACT ABSRB: Type: IMPLANTABLE DEVICE | Site: HIP | Status: FUNCTIONAL

## 2022-03-17 RX ORDER — SODIUM CHLORIDE 0.9 % (FLUSH) 0.9 %
5-40 SYRINGE (ML) INJECTION EVERY 8 HOURS
Status: DISCONTINUED | OUTPATIENT
Start: 2022-03-17 | End: 2022-03-17 | Stop reason: HOSPADM

## 2022-03-17 RX ORDER — OXYCODONE AND ACETAMINOPHEN 5; 325 MG/1; MG/1
1 TABLET ORAL
Qty: 20 TABLET | Refills: 0 | Status: SHIPPED | OUTPATIENT
Start: 2022-03-17 | End: 2022-03-17

## 2022-03-17 RX ORDER — LIDOCAINE HYDROCHLORIDE 10 MG/ML
0.1 INJECTION INFILTRATION; PERINEURAL AS NEEDED
Status: DISCONTINUED | OUTPATIENT
Start: 2022-03-17 | End: 2022-03-17 | Stop reason: HOSPADM

## 2022-03-17 RX ORDER — LIDOCAINE HYDROCHLORIDE 10 MG/ML
INJECTION INFILTRATION; PERINEURAL AS NEEDED
Status: DISCONTINUED | OUTPATIENT
Start: 2022-03-17 | End: 2022-03-17 | Stop reason: HOSPADM

## 2022-03-17 RX ORDER — OXYCODONE HYDROCHLORIDE 5 MG/1
10 TABLET ORAL
Status: DISCONTINUED | OUTPATIENT
Start: 2022-03-17 | End: 2022-03-17 | Stop reason: HOSPADM

## 2022-03-17 RX ORDER — PROPOFOL 10 MG/ML
INJECTION, EMULSION INTRAVENOUS
Status: DISCONTINUED | OUTPATIENT
Start: 2022-03-17 | End: 2022-03-17 | Stop reason: HOSPADM

## 2022-03-17 RX ORDER — SODIUM CHLORIDE, SODIUM LACTATE, POTASSIUM CHLORIDE, CALCIUM CHLORIDE 600; 310; 30; 20 MG/100ML; MG/100ML; MG/100ML; MG/100ML
100 INJECTION, SOLUTION INTRAVENOUS CONTINUOUS
Status: DISCONTINUED | OUTPATIENT
Start: 2022-03-17 | End: 2022-03-17 | Stop reason: HOSPADM

## 2022-03-17 RX ORDER — HYDROMORPHONE HYDROCHLORIDE 2 MG/ML
0.5 INJECTION, SOLUTION INTRAMUSCULAR; INTRAVENOUS; SUBCUTANEOUS
Status: DISCONTINUED | OUTPATIENT
Start: 2022-03-17 | End: 2022-03-17 | Stop reason: HOSPADM

## 2022-03-17 RX ORDER — OXYCODONE HYDROCHLORIDE 5 MG/1
5 TABLET ORAL
Status: DISCONTINUED | OUTPATIENT
Start: 2022-03-17 | End: 2022-03-17 | Stop reason: HOSPADM

## 2022-03-17 RX ORDER — ACETAMINOPHEN 500 MG
1000 TABLET ORAL ONCE
Status: DISCONTINUED | OUTPATIENT
Start: 2022-03-17 | End: 2022-03-17 | Stop reason: HOSPADM

## 2022-03-17 RX ORDER — FLUMAZENIL 0.1 MG/ML
0.2 INJECTION INTRAVENOUS
Status: DISCONTINUED | OUTPATIENT
Start: 2022-03-17 | End: 2022-03-17 | Stop reason: HOSPADM

## 2022-03-17 RX ORDER — SODIUM CHLORIDE 0.9 % (FLUSH) 0.9 %
5-40 SYRINGE (ML) INJECTION AS NEEDED
Status: DISCONTINUED | OUTPATIENT
Start: 2022-03-17 | End: 2022-03-17 | Stop reason: HOSPADM

## 2022-03-17 RX ORDER — DIPHENHYDRAMINE HYDROCHLORIDE 50 MG/ML
12.5 INJECTION, SOLUTION INTRAMUSCULAR; INTRAVENOUS
Status: DISCONTINUED | OUTPATIENT
Start: 2022-03-17 | End: 2022-03-17 | Stop reason: HOSPADM

## 2022-03-17 RX ORDER — VANCOMYCIN 1.75 GRAM/500 ML IN 0.9 % SODIUM CHLORIDE INTRAVENOUS
1750
Status: COMPLETED | OUTPATIENT
Start: 2022-03-17 | End: 2022-03-17

## 2022-03-17 RX ORDER — CEPHALEXIN 500 MG/1
500 CAPSULE ORAL 2 TIMES DAILY
Qty: 10 CAPSULE | Refills: 0 | Status: SHIPPED | OUTPATIENT
Start: 2022-03-17 | End: 2022-03-22

## 2022-03-17 RX ORDER — NALOXONE HYDROCHLORIDE 0.4 MG/ML
0.2 INJECTION, SOLUTION INTRAMUSCULAR; INTRAVENOUS; SUBCUTANEOUS AS NEEDED
Status: DISCONTINUED | OUTPATIENT
Start: 2022-03-17 | End: 2022-03-17 | Stop reason: HOSPADM

## 2022-03-17 RX ADMIN — VANCOMYCIN HYDROCHLORIDE 1750 MG: 10 INJECTION, POWDER, LYOPHILIZED, FOR SOLUTION INTRAVENOUS at 12:00

## 2022-03-17 RX ADMIN — SODIUM CHLORIDE, SODIUM LACTATE, POTASSIUM CHLORIDE, AND CALCIUM CHLORIDE 100 ML/HR: 600; 310; 30; 20 INJECTION, SOLUTION INTRAVENOUS at 10:29

## 2022-03-17 RX ADMIN — DEXMEDETOMIDINE 0.7 MCG/KG/HR: 100 INJECTION, SOLUTION, CONCENTRATE INTRAVENOUS at 12:00

## 2022-03-17 RX ADMIN — GENTAMICIN SULFATE 420 MG: 40 INJECTION, SOLUTION INTRAMUSCULAR; INTRAVENOUS at 10:32

## 2022-03-17 RX ADMIN — PROPOFOL 100 MCG/KG/MIN: 10 INJECTION, EMULSION INTRAVENOUS at 12:00

## 2022-03-17 NOTE — PERIOP NOTES
Discharge instructions given to patient's daughter, Lopez Strong, at bedside. Verbalized understanding. No questions at this time.

## 2022-03-17 NOTE — OP NOTES
[address] [place] [address] OPERATIVE REPORT     Name:  Laura Noel  MR#:  [id number]  :  1950     DATE OF SERVICE: 3/17/2022     PREOPERATIVE DIAGNOSIS:  Urge urinary incontinence. POSTOPERATIVE DIAGNOSIS:  Urge urinary incontinence. PROCEDURE PERFORMED:  Stage I and 2 sacral nerve InterStim placement. SURGEON:  Tone Chang MD     ASSISTANT:  None. ANESTHESIA:  MAC.     COMPLICATIONS:  None. SPECIMENS REMOVED:  None. IMPLANTS:  InterStim lead placement X 1 on R Side      ESTIMATED BLOOD LOSS:  1 mL. FINDINGS:  R InterStim lead placement with good position verified on fluoroscopy and confirmed tatyana and large toe flexion on leads zero through 3 at 1.4 volts. INDICATIONS FOR OPERATIVE PROCEDURE:  The patient is a 66year old female with a history of urge urinary incontinence refractory to conservative therapy. Underwent office interstim trial with > 50% improvement in symptoms. Counseled on management options and decided to pursue stage I &2  InterStim today in the operating room for further management. DESCRIPTION OF OPERATIVE PROCEDURE:  After informed consent was obtained and the correct patient was identified in the preoperative holding area, the was taken back to the operating room suite and placed on the table in the supine position. Time-out was performed confirming the correct patient and planned procedure. The received IV vancomycin and gentamicin prior to smooth induction of MAC anesthesia. The patient was then turned into the prone position and prepped and draped in the usual sterile fashion. Care was taken to pad all pressure points. A 10-minute scrub was done given that this was an implant case to minimize the risk of infection. At this point, I then used fluoroscopy and my hemostat device to identify the S3 foramen medial aspect. I marked it with a marker in the AP position on flouroscopy.   I then turned to the lateral position and used my hemostat device to blaze my entry points, which were about 1 cm above the S3 foramen blaze. I then injected 1% lidocaine without epinephrine to perform a local block at the injection sites. I then passed my introducer stylets into the S3 foramen using my marks as a guide, marching down the sacrum until they dropped into the S3 foramen. I confirmed their position on fluoroscopy. I tested them, readjusted them and re-tested them until I achieved a good response at low voltage. R side was tested first but ultimately the L side provided a better response and was used as the final lead placement site. The patient demonstrated large tatyana and big toe flexion at 1.4 volts. I then removed the inner part of the stylet and inserted my guidewire. I then carefully backloaded the stylet off of the guidewire leaving the guidewire in place. Position of the guidewire was confirmed on fluoroscopy in the S3 foramen. After this, I made an incision approximately 1 cm along the guidewire into the subcutaneous fat. I then passed my dilator sheath with introducer needle over the guidewire into the S3 foramen under fluoroscopy. Once the marker was in position about prison across the bone table at the S3 foramen, I then removed the guidewire and the introducer needle leaving the sheath in place. At this point, I then carefully used a curved stylet to pass my InterStim lead into the S3 foramen. I took great care not to push it too far and left leads two and three bridging the bone table at the sacrum at the S3 foramen. I achieved medial to lateral curvature in the classic hockey stick formation and confirmed this on fluoroscopy. Once the lead was in position, I tested it and again and achieved good tatyana and big toe flexion at the settings of 1.4 volts. At this point, once the lead was in position, I then carefully backloaded the sheath off of the lead taking care not to move the lead and deploying the tines.   I then tested the lead again and confirmed correct placement on imaging and the lead continued to test well. After the lead was in position, I completely removed the outside sheath and left the lead for later use. I then turned my attention to the patient's l upper buttock and identified an area of about 4 cm were I could place my subcutaneous pocket for the eventual generator. I took great care to ensure that it was in a position that would not allow the patient to sit on it OR rub it with their waist ban/belt. I then used a 15 blade scalpel as well as electrocautery to develop the pocket into the subcutaneous fat about 0.5 to 1 inch in depth. I then used my passer to pass the InterStim lead from the mid back incision out into the subcutaneous pocket laterally. I irrigated the incision copiously with antibiotic irrigant and then cleaned the leads. I then connected the lead to my InterStim battery pack and secured the screw using the small screwdriver. I then verified that the connection was in the correct place with all four blue visible leads visible in the connection chamber. I then carefully placed the lead and the battery back into the patient's L buttock secured in an antibiotic pouch, taking great care not to kink the lead and taking care to protect it. At this point, I then copiously irrigated the wounds with antibiotic irrigation and I proceeded to achieve hemostasis using electrocautery. We synced the device prior to closure with the  and it demonstrated good function of all of the leads. I then proceeded with my closure. I closed the subcutaneous fat with a 2-0 PDS in an interrupted fashion. I then closed the skin with a 4-0 Monocryl in a running subcuticular fashion. I then applied Dermabond to both the central back wound as well as the buttock pocket wound and injected local anesthesia.   At this point, the patient was then awoken from anesthesia, transferred to the PACU in stable condition. The patient tolerated the procedure well. There were no complications. All counts were correct at the end of the procedure. The InterStim rep then trialed and worked with the patient in the PACU in regards to the device. The patient will follow up with me in 2 weeks for a wound check. Walt Kohler M.D.      [facility]  [address]  Phone: [phone]  Fax: [phone]

## 2022-03-17 NOTE — ANESTHESIA PREPROCEDURE EVALUATION
Relevant Problems   NEUROLOGY   (+) Depression      CARDIOVASCULAR   (+) Mild mitral regurgitation by prior echocardiogram   (+) Paroxysmal atrial fibrillation (HCC)      GASTROINTESTINAL   (+) GERD (gastroesophageal reflux disease)      ENDOCRINE   (+) Exogenous obesity      PERSONAL HX & FAMILY HX OF CANCER   (+) Skin cancer of arm, right       Anesthetic History     PONV          Review of Systems / Medical History  Patient summary reviewed and pertinent labs reviewed    Pulmonary                   Neuro/Psych         Psychiatric history     Cardiovascular            Dysrhythmias (on Eliquis) : atrial fibrillation      Exercise tolerance: >4 METS     GI/Hepatic/Renal     GERD: well controlled           Endo/Other        Morbid obesity     Other Findings              Physical Exam    Airway  Mallampati: II  TM Distance: 4 - 6 cm  Neck ROM: normal range of motion   Mouth opening: Normal     Cardiovascular    Rhythm: regular  Rate: normal         Dental         Pulmonary  Breath sounds clear to auscultation               Abdominal         Other Findings            Anesthetic Plan    ASA: 3  Anesthesia type: total IV anesthesia          Induction: Intravenous  Anesthetic plan and risks discussed with: Patient and Son / Daughter

## 2022-03-17 NOTE — PROGRESS NOTES
Spiritual Care visit. Initial Visit, Pre Surgery Consult. Visit and prayer before patient goes to surgery.     Visit by Renetta Sagastume M.Ed., Th.B. ,Staff

## 2022-03-17 NOTE — ANESTHESIA POSTPROCEDURE EVALUATION
Procedure(s):  SACRAL NERVE STIMULATOR INSERTION /STAGE 2.    total IV anesthesia    Anesthesia Post Evaluation      Multimodal analgesia: multimodal analgesia used between 6 hours prior to anesthesia start to PACU discharge  Patient location during evaluation: bedside  Patient participation: complete - patient participated  Level of consciousness: awake  Pain management: adequate  Airway patency: patent  Anesthetic complications: no  Cardiovascular status: acceptable and stable  Respiratory status: acceptable and room air  Hydration status: acceptable  Post anesthesia nausea and vomiting:  none  Final Post Anesthesia Temperature Assessment:  Normothermia (36.0-37.5 degrees C)      INITIAL Post-op Vital signs:   Vitals Value Taken Time   /63 03/17/22 1509   Temp 36.1 °C (97 °F) 03/17/22 1349   Pulse 45 03/17/22 1511   Resp 16 03/17/22 1409   SpO2 92 % 03/17/22 1511   Vitals shown include unvalidated device data.

## 2022-03-17 NOTE — H&P
[address] Urology H&P Note                                           03/17/22     Patient: Miguel Fraser  MRN: [id number]    Admission Date:  3/17/2022, 0  Admission Diagnosis: OAB (overactive bladder) [N32.81]; Urge incontinence [N39.41]    ASSESSMENT: 66 y.o. female with refractory UUI who presents for combined stage 1/2 interstim today. PLAN:  -To OR for combined stage 1/2 interstim  -Consented  -Antibiotic on call to OR  -NPO for procedure       __________________________________________________________________________________    HPI:     Miguel Fraser is a 66 y.o.  female with re fractory urge urinary incontinence and successful P&E trial in office who presents for combined stage 1/2 interstim today. Stopped ASA since Monday. No changes in medical history since last office visit with me. Ready for procedure today. Past Medical History:  Past Medical History:   Diagnosis Date    Acute cervical radiculopathy     Arthralgia of left shoulder region     Arthritis     fingers    Cervicalgia     Chest pain     pt denies CP or SOB    Chronic pain     right shoulder, sciatica right hip    Claustrophobia 2/21/2017    Coronary disease     Followed by [address] [facility] [address] [facility].  COVID-19 vaccine series completed     Pfizer vaccine completed X3 doses    Current use of long term anticoagulation     Eliquis and Aspirin    Depression     controlled with sertraline daily    Displacement of cervical intervertebral disc without myelopathy     Exogenous obesity     Extremity pain     Fibrocystic breast disease     Former cigarette smoker     GERD (gastroesophageal reflux disease)     controlled with omeprazole daily    H/O partial adrenalectomy ([facility] [address]     right adrenalectomy (benign tumor)    History of bone density study 2017    Dexa 2017:  Wnl.  10 year fracture risk (FRAX score):   Any Fracture:  7.9%  Hip fracture:  0.7%    History of colonoscopy 2017 Colonoscopy current (2017 Dr. José Duncan --> diverticulitis and tortuous colon, R 10 y). 2018 f/u ([place] noncardiac CP, likely esophageal spasm). Pt cxd EGD. 11/[facility] Abril Orr, JIM.  -->resched EGD w/ Dr. José Duncan (see [facility] media\" 2/7/2020 \"Referral order/[facility]\" office note)      History of Helicobacter pylori infection 9/2014    treated with 2 different antibiotics and PPI    HNP (herniated nucleus pulposus), lumbar     Lumbar radiculopathy     Morbid obesity ([address]     BMI 40.7    Nausea & vomiting     pt does well with antiemetic    Neck sprain     & STRAIN    Overactive bladder     Paroxysmal atrial fibrillation ([address]     Spinal stenosis, cervical region     Urge incontinence        Past Surgical History:  Past Surgical History:   Procedure Laterality Date    HX BREAST LUMPECTOMY Left 12/12/2014    LEFT BREAST DUCTAL EXCISION @ 12 O'CLOCK  Ruven Klinefelter, MD at [address], Left breast, 11:30 position, 5 cm  from nipple, core biopsy: Fibrocystic mastopathy having minimal intraductal hyperplasia and apocrine metaplasia.  HX CHOLECYSTECTOMY  1980    HX COLONOSCOPY      HX ENDOSCOPY      HX HYSTERECTOMY  1995    per pt retained her ovaries    HX ORTHOPAEDIC  2007    torn tendon right elbow    HX OTHER SURGICAL  1994    right adrenalectomy (benign tumor)    HX OTHER SURGICAL      acdf    NH BREAST SURGERY PROCEDURE UNLISTED Left 1999    breast abscess    US GUIDED CORE BREAST BIOPSY         Medications:  No current facility-administered medications on file prior to encounter. Current Outpatient Medications on File Prior to Encounter   Medication Sig Dispense Refill    loratadine (CLARITIN PO) Take  by mouth nightly.  famotidine (PEPCID) 20 mg tablet Take 20 mg by mouth daily.  sertraline (ZOLOFT) 50 mg tablet Take 50 mg by mouth nightly.  cholecalciferol (Vitamin D3) 25 mcg (1,000 unit) cap Take  by mouth daily.       multivitamin (ONE A DAY) tablet Take 1 Tablet by mouth nightly.  Eliquis 5 mg tablet Take 1 Tablet by mouth two (2) times a day. 180 Tablet 3    propafenone (RYTHMOL) 150 mg tablet Take 1 Tablet by mouth three (3) times daily. 180 Tablet 3    metoprolol succinate (TOPROL-XL) 25 mg XL tablet Take 1 Tablet by mouth daily. 90 Tablet 3    ASPIRIN PO Take 81 mg by mouth daily.  omeprazole (PRILOSEC) 40 mg capsule Take 40 mg by mouth two (2) times a day. Allergies: Allergies   Allergen Reactions    Codeine Nausea Only     \"horrible stomach pain\"    Norco [Hydrocodone-Acetaminophen] Nausea and Vomiting    Oxycodone Nausea and Vomiting       Social History:  Social History     Socioeconomic History    Marital status:      Spouse name: Not on file    Number of children: Not on file    Years of education: Not on file    Highest education level: Not on file   Occupational History    Occupation: retired   Tobacco Use    Smoking status: Former Smoker     Packs/day: 0.25     Years: 10.00     Pack years: 2.50     Quit date: 1990     Years since quittin.3    Smokeless tobacco: Never Used   Vaping Use    Vaping Use: Never used   Substance and Sexual Activity    Alcohol use: Yes     Comment: occasionally    Drug use: No    Sexual activity: Not Currently     Partners: Male     Comment: hysterectomy   Other Topics Concern    Not on file   Social History Narrative    Pt is primary caregiver for  who just suffered another stroke; going home later this week. IM:  Dr. Darshan Styles    Derm:  Dr. Diaz Age.         10/2018 Pt's  . Social Determinants of Health     Financial Resource Strain:     Difficulty of Paying Living Expenses: Not on file   Food Insecurity:     Worried About Running Out of Food in the Last Year: Not on file    Martin of Food in the Last Year: Not on file   Transportation Needs:     Lack of Transportation (Medical):  Not on file    Lack of Transportation (Non-Medical): Not on file   Physical Activity:     Days of Exercise per Week: Not on file    Minutes of Exercise per Session: Not on file   Stress:     Feeling of Stress : Not on file   Social Connections:     Frequency of Communication with Friends and Family: Not on file    Frequency of Social Gatherings with Friends and Family: Not on file    Attends Anabaptist Services: Not on file    Active Member of [address] [place] [address] or Organizations: Not on file    Attends Club or Organization Meetings: Not on file    Marital Status: Not on file   Intimate Partner Violence:     Fear of Current or Ex-Partner: Not on file    Emotionally Abused: Not on file    Physically Abused: Not on file    Sexually Abused: Not on file   Housing Stability:     Unable to Pay for Housing in the Last Year: Not on file    Number of [place] in the Last Year: Not on file    Unstable Housing in the Last Year: Not on file       Family History:  Family History   Problem Relation Age of Onset    Lung Disease Mother         copd    Arrhythmia Mother         A.  Fib    Heart Disease Brother     Heart Disease Brother        ROS:  Review of Systems - General ROS: negative for - chills, fatigue or fever  Respiratory ROS: no cough, shortness of breath, or wheezing  Cardiovascular ROS: no chest pain or dyspnea on exertion  Gastrointestinal ROS: no abdominal pain, change in bowel habits, or black or bloody stools  Genito-Urinary ROS: no dysuria, trouble voiding, or hematuria  Musculoskeletal ROS: negative  negative for - muscular weakness    Vitals:  Visit Vitals  BP (!) 156/70 (BP 1 Location: Left upper arm, BP Patient Position: Sitting)   Pulse (!) 55   Temp 97.6 °F (36.4 °C)   Resp 20   Ht 5' 7\" (1.702 m)   Wt 260 lb (117.9 kg)   SpO2 100%   BMI 40.72 kg/m²       Intake/Output:  No intake or output data in the 24 hours ending 03/17/22 1131     Physical Exam:   Visit Vitals  BP (!) 156/70 (BP 1 Location: Left upper arm, BP Patient Position: Sitting)   Pulse (!) 55   Temp 97.6 °F (36.4 °C)   Resp 20   Ht 5' 7\" (1.702 m)   Wt 260 lb (117.9 kg)   SpO2 100%   BMI 40.72 kg/m²        GENERAL: No acute distress, Awake, Alert, Oriented X 3  CARDIAC: regular rate and rhythm  CHEST AND LUNG: Easy work of breathing  ABDOMEN: soft, non tender, non-distended    Lab/Radiology/Other Diagnostic Tests:  No results for input(s): HGB, HCT, WBC, NA, K, CL, CO2, BUN, CR, GLU, CA, MG, ALBUMIN, AST, ALT, PREALB, INR, HGBEXT, HCTEXT, INREXT in the last 72 hours. No lab exists for component: PLTCT, PO4, TOTPROT, TOTBILI, ALKPHOS, BOOM, LIPASE, PT, PTT      Walt Aguilera M.D.     [facility]  [address] [facility], [address]  Phone: [phone]  Fax: [phone]

## 2022-03-17 NOTE — DISCHARGE INSTRUCTIONS
Care instructions for a Sacral Nerve Stimulator  Activity  - Avoid heavy lifting, bending over, strenuous exercise, and twisting for 6 weeks or until cleared by your doctor. - Do not drive until you are off pain medications and have no pain with the movements of driving.  - Keep the site clean and dry. - You may shower 24-48 hours after surgery but do not immerse the incision in water until cleared by your doctor. - Use pain medication as prescribed. Most patients have pain for the first two weeks or so after the surgery. - If you are nauseated, start with clear liquids and advance as tolerated, but avoid anything heavy, greasy, or spicy for the first 24 hours after surgery.   - You may place ice to the area for 10-20 minutes every 1 to 2 hours for the first 24 hours after surgery unless your doctor states otherwise. Call your doctor if you experience any of the following symptoms:  - bleeding that does not stop after 10 minutes of direct pressure. - excessive redness, swelling, red streaks, or heat in the incision site. - unusual colored or smelling drainage coming from the site  - fever over 101.  - an inability to control your bladder or bowels  - fainting  - pain unrelieved by pain medication  - nausea and vomiting to the point that you can't keep anything down. Medication Interaction:  During your procedure you potentially received a medication or medications which may reduce the effectiveness of oral contraceptives. Please consider other forms of contraception for 1 month following your procedure if you are currently using oral contraceptives as your primary form of birth control. In addition to this, we recommend continuing your oral contraceptive as prescribed, unless otherwise instructed by your physician, during this time.     After general anesthesia or intravenous sedation, for 24 hours or while taking prescription Narcotics:  · Limit your activities  · A responsible adult needs to be with you for the next 24 hours  · Do not drive and operate hazardous machinery  · Do not make important personal or business decisions  · Do not drink alcoholic beverages  · If you have not urinated within 8 hours after discharge, and you are experiencing discomfort from urinary retention, please go to the nearest ED. · If you have sleep apnea and have a CPAP machine, please use it for all naps and sleeping. · Please use caution when taking narcotics and any of your home medications that may cause drowsiness. *  Please give a list of your current medications to your Primary Care Provider. *  Please update this list whenever your medications are discontinued, doses are      changed, or new medications (including over-the-counter products) are added. *  Please carry medication information at all times in case of emergency situations. These are general instructions for a healthy lifestyle:  No smoking/ No tobacco products/ Avoid exposure to second hand smoke  Surgeon General's Warning:  Quitting smoking now greatly reduces serious risk to your health. Obesity, smoking, and sedentary lifestyle greatly increases your risk for illness  A healthy diet, regular physical exercise & weight monitoring are important for maintaining a healthy lifestyle    You may be retaining fluid if you have a history of heart failure or if you experience any of the following symptoms:  Weight gain of 3 pounds or more overnight or 5 pounds in a week, increased swelling in our hands or feet or shortness of breath while lying flat in bed. Please call your doctor as soon as you notice any of these symptoms; do not wait until your next office visit.

## 2022-03-18 PROBLEM — Z79.899 LONG TERM CURRENT USE OF ANTIARRHYTHMIC DRUG: Status: ACTIVE | Noted: 2020-12-02

## 2022-03-18 PROBLEM — Z78.0 POST-MENOPAUSE: Status: ACTIVE | Noted: 2017-03-09

## 2022-03-18 PROBLEM — Z98.890 HISTORY OF COLONOSCOPY: Status: ACTIVE | Noted: 2017-01-01

## 2022-03-18 PROBLEM — F40.240 CLAUSTROPHOBIA: Status: ACTIVE | Noted: 2017-02-21

## 2022-03-18 PROBLEM — R53.83 OTHER FATIGUE: Status: ACTIVE | Noted: 2018-07-11

## 2022-03-18 PROBLEM — G89.29 CHRONIC LOW BACK PAIN: Status: ACTIVE | Noted: 2017-03-09

## 2022-03-18 PROBLEM — R05.9 COUGH: Status: ACTIVE | Noted: 2017-06-27

## 2022-03-18 PROBLEM — J06.9 URI (UPPER RESPIRATORY INFECTION): Status: ACTIVE | Noted: 2020-02-27

## 2022-03-18 PROBLEM — K22.4 ESOPHAGEAL SPASM: Status: ACTIVE | Noted: 2019-11-07

## 2022-03-18 PROBLEM — M54.50 CHRONIC LOW BACK PAIN: Status: ACTIVE | Noted: 2017-03-09

## 2022-03-19 PROBLEM — R32 URINARY INCONTINENCE: Status: ACTIVE | Noted: 2018-07-11

## 2022-03-19 PROBLEM — M25.561 ACUTE PAIN OF RIGHT KNEE: Status: ACTIVE | Noted: 2019-08-19

## 2022-03-19 PROBLEM — R63.4 WEIGHT LOSS: Status: ACTIVE | Noted: 2017-03-09

## 2022-03-19 PROBLEM — H92.01 RIGHT EAR PAIN: Status: ACTIVE | Noted: 2018-07-11

## 2022-03-19 PROBLEM — M79.604 RIGHT LEG PAIN: Status: ACTIVE | Noted: 2018-10-29

## 2022-03-19 PROBLEM — E78.2 ELEVATED TRIGLYCERIDES WITH HIGH CHOLESTEROL: Status: ACTIVE | Noted: 2018-03-12

## 2022-03-19 PROBLEM — M25.511 CHRONIC RIGHT SHOULDER PAIN: Status: ACTIVE | Noted: 2019-08-19

## 2022-03-19 PROBLEM — M79.645 PAIN OF LEFT THUMB: Status: ACTIVE | Noted: 2017-09-14

## 2022-03-19 PROBLEM — E66.9 OBESITY (BMI 30-39.9): Status: ACTIVE | Noted: 2020-12-02

## 2022-03-19 PROBLEM — E55.9 VITAMIN D DEFICIENCY: Status: ACTIVE | Noted: 2018-07-11

## 2022-03-19 PROBLEM — I34.0 MILD MITRAL REGURGITATION BY PRIOR ECHOCARDIOGRAM: Status: ACTIVE | Noted: 2021-12-30

## 2022-03-19 PROBLEM — M25.531 ARTHRALGIA OF RIGHT WRIST: Status: ACTIVE | Noted: 2017-06-27

## 2022-03-19 PROBLEM — N95.2 VAGINAL ATROPHY: Status: ACTIVE | Noted: 2018-09-14

## 2022-03-19 PROBLEM — G89.29 CHRONIC RIGHT SHOULDER PAIN: Status: ACTIVE | Noted: 2019-08-19

## 2022-03-19 PROBLEM — Z79.01 ANTICOAGULANT LONG-TERM USE: Status: ACTIVE | Noted: 2017-08-04

## 2022-03-19 PROBLEM — I25.84 CALCIFICATION OF CORONARY ARTERY: Status: ACTIVE | Noted: 2021-06-24

## 2022-03-19 PROBLEM — M62.838 MUSCLE SPASM: Status: ACTIVE | Noted: 2019-04-24

## 2022-03-19 PROBLEM — N32.81 OAB (OVERACTIVE BLADDER): Status: ACTIVE | Noted: 2018-09-14

## 2022-03-19 PROBLEM — R73.9 HYPERGLYCEMIA: Status: ACTIVE | Noted: 2017-03-09

## 2022-03-19 PROBLEM — R10.2 VAGINAL PAIN: Status: ACTIVE | Noted: 2018-09-14

## 2022-03-19 PROBLEM — M54.16 ACUTE LUMBAR RADICULOPATHY: Status: ACTIVE | Noted: 2018-11-16

## 2022-03-19 PROBLEM — I25.10 CALCIFICATION OF CORONARY ARTERY: Status: ACTIVE | Noted: 2021-06-24

## 2022-03-19 PROBLEM — R31.9 HEMATURIA: Status: ACTIVE | Noted: 2018-07-11

## 2022-03-19 PROBLEM — E66.01 MORBID OBESITY WITH BMI OF 40.0-44.9, ADULT (HCC): Status: ACTIVE | Noted: 2018-04-24

## 2022-03-20 PROBLEM — C44.602 SKIN CANCER OF ARM, RIGHT: Status: ACTIVE | Noted: 2018-03-12

## 2022-03-20 PROBLEM — N90.4 LICHEN SCLEROSUS ET ATROPHICUS OF THE VULVA: Status: ACTIVE | Noted: 2018-02-05

## 2022-03-20 PROBLEM — M54.2 NECK PAIN: Status: ACTIVE | Noted: 2018-04-24

## 2022-06-15 RX ORDER — PROPAFENONE HYDROCHLORIDE 150 MG/1
150 TABLET, FILM COATED ORAL 3 TIMES DAILY
Qty: 270 TABLET | Refills: 1 | Status: SHIPPED | OUTPATIENT
Start: 2022-06-15 | End: 2022-10-24 | Stop reason: SDUPTHER

## 2022-06-29 ENCOUNTER — HOSPITAL ENCOUNTER (OUTPATIENT)
Dept: GENERAL RADIOLOGY | Age: 72
Discharge: HOME | End: 2022-07-01
Payer: MEDICARE

## 2022-06-29 DIAGNOSIS — J06.9 UPPER RESPIRATORY TRACT INFECTION, UNSPECIFIED TYPE: ICD-10-CM

## 2022-06-29 PROCEDURE — 71046 X-RAY EXAM CHEST 2 VIEWS: CPT

## 2022-06-29 NOTE — PROGRESS NOTES
[facility] CARDIOLOGY Follow Up                 Reason for Visit: Paroxysmal atrial fibrillation    Subjective:     Patient is a 67 y.o. female with a PMH of paroxysmal atrial fibrillation and calcification of coronary artery who presents for follow-up. The patient was last seen in December 2021. A lipid panel and hepatic panel were ordered. She was noted to have hyperlipidemia and her hepatic panel was normal; therefore, Crestor 10 mg daily was started. The patient had an echocardiogram in December 2020 that was noted to demonstrate a normal EF. Mild MR was noted. The patient reports having \"8 episodes of atrial fibrillation\" since March 2022. She denies chest pain and dyspnea. Past Medical History:   Diagnosis Date    Acute cervical radiculopathy     Arthralgia of left shoulder region     Arthritis     fingers    Cervicalgia     Chest pain     pt denies CP or SOB    Chronic pain     right shoulder, sciatica right hip    Claustrophobia 2/21/2017    Coronary disease     Followed by [facility].  COVID-19 vaccine series completed     Pfizer vaccine completed X3 doses    Current use of long term anticoagulation     Eliquis and Aspirin    Depression     controlled with sertraline daily    Displacement of cervical intervertebral disc without myelopathy     Exogenous obesity     Extremity pain     Fibrocystic breast disease     Former cigarette smoker     GERD (gastroesophageal reflux disease)     controlled with omeprazole daily    H/O partial adrenalectomy ([address]     right adrenalectomy (benign tumor)    History of bone density study 2017    Dexa 2017:  Wnl.  10 year fracture risk (FRAX score): Any Fracture:  7.9%  Hip fracture:  0.7%    History of colonoscopy 2017    Colonoscopy current (2017 Dr. Ant Rodriguez --> diverticulitis and tortuous colon, R 10 y). 2018 f/u ([place] noncardiac CP, likely esophageal spasm).   Pt cxd EGD. [facility] Radha Lyn, APRN.  -->resched EGD w/ Dr. Ant Rodriguez (see \"[facility] media\" 2020 \"Referral order/[facility]\" office note)      History of Helicobacter pylori infection 2014    treated with 2 different antibiotics and PPI    HNP (herniated nucleus pulposus), lumbar     Lumbar radiculopathy     Morbid obesity (HCC)     BMI 40.7    Nausea & vomiting     pt does well with antiemetic    Neck sprain     & STRAIN    Overactive bladder     Paroxysmal atrial fibrillation ([address]     Spinal stenosis, cervical region     Urge incontinence       Past Surgical History:   Procedure Laterality Date    BREAST LUMPECTOMY Left 2014    LEFT BREAST DUCTAL EXCISION @ 12 O'CLOCK  Festus Rizzo MD at [facility] MAIN OR, Left breast, 11:30 position, 5 cm  from nipple, core biopsy: Fibrocystic mastopathy having minimal intraductal hyperplasia and apocrine metaplasia.  BREAST SURGERY Left     breast abscess    CHOLECYSTECTOMY      COLONOSCOPY     [place]    per pt retained her ovaries    ORTHOPEDIC SURGERY      torn tendon right elbow    OTHER SURGICAL HISTORY      acdf    OTHER SURGICAL HISTORY      right adrenalectomy (benign tumor)    UPPER GASTROINTESTINAL ENDOSCOPY      US GUIDED CORE BREAST BIOPSY        Family History   Problem Relation Age of Onset    Heart Disease Brother     Lung Disease Mother         copd    Arrhythmia Mother         A. Fib    Heart Disease Brother       Social History     Tobacco Use    Smoking status: Former Smoker     Packs/day: 0.25     Quit date: 1990     Years since quittin.5    Smokeless tobacco: Never Used   Substance Use Topics    Alcohol use: Yes      Allergies   Allergen Reactions    Codeine Nausea Only     \"horrible stomach pain\"    Hydrocodone-Acetaminophen Nausea And Vomiting    Oxycodone Nausea And Vomiting         ROS:  No obvious pertinent positives on review of systems except for what was outlined above.        Objective:       /68   Pulse 56   Ht 5' 7.5\" (1.715 m)   Wt 267 lb (121.1 kg)   BMI 41.20 kg/m²     BP Readings from Last 3 Encounters:   06/30/22 108/68   12/30/21 132/80   08/02/21 128/78       Wt Readings from Last 3 Encounters:   06/30/22 267 lb (121.1 kg)   03/14/22 260 lb (117.9 kg)   12/30/21 259 lb 9.6 oz (117.8 kg)       General/Constitutional:   Alert and oriented x 3, no acute distress  HEENT:   normocephalic, atraumatic, moist mucous membranes  Neck:   No JVD or carotid bruits bilaterally  Cardiovascular:   regular rate and rhythm, no rub/gallop appreciated  Pulmonary:   clear to auscultation bilaterally, no respiratory distress  Abdomen:   soft, non-tender, non-distended  Ext:   No sig LE edema bilaterally  Skin:  warm and dry, no obvious rashes seen  Neuro:   no obvious sensory or motor deficits  Psychiatric:   normal mood and affect    Data Review:   Lab Results   Component Value Date    CHOL 205 (H) 12/30/2021     Lab Results   Component Value Date    TRIG 102 12/30/2021     Lab Results   Component Value Date    HDL 66 12/30/2021     Lab Results   Component Value Date    LDLCALC 121 (H) 12/30/2021     Lab Results   Component Value Date    VLDL 18 12/30/2021     No results found for: [facility]     Lab Results   Component Value Date/Time     12/30/2019 11:23 AM    K 4.2 12/30/2019 11:23 AM     12/30/2019 11:23 AM    CO2 32 12/30/2019 11:23 AM    BUN 17 12/30/2019 11:23 AM    CREATININE 0.90 12/30/2019 11:23 AM    GLUCOSE 118 12/30/2019 11:23 AM    CALCIUM 8.8 12/30/2019 11:23 AM         Lab Results   Component Value Date    ALT 18 12/30/2021    AST 13 12/30/2021        Assessment/Plan:   1. Paroxysmal atrial fibrillation (HCC)  - [id number] equals 3  - Currently in sinus rhythm  - Concern for symptomatic breakthrough atrial fibrillation on AAD  - IVS and LVPW <1.4 cm on echocardiography in December 2020 with a normal EF noted  - Refer to EP for consideration of PVI  - Continue with propafenone, Eliquis, and Toprol-XL    2.  Coronary artery calcification of native artery  - Discussed with patient that coronary artery calcium means hardening of the arteries, and is a surrogate marker of advanced atherosclerosis of the coronary arteries; however, the presence of hardening of the arteries does not necessarily mean the patient will have an MI  - No robust evidence to support aspirin use for this indication  - Discontinue baby aspirin daily to mitigate bleeding risk  - Continue with Crestor    3. Mild mitral regurgitation by prior echocardiogram  - Obtain a surveillance echocardiogram in 2023 to 2025    4.  Hyperlipidemia, unspecified hyperlipidemia type  - Continue with Crestor    F/U: 6 months    Pita Burch MD

## 2022-06-30 ENCOUNTER — OFFICE VISIT (OUTPATIENT)
Dept: UROLOGY | Age: 72
End: 2022-06-30
Payer: MEDICARE

## 2022-06-30 ENCOUNTER — OFFICE VISIT (OUTPATIENT)
Dept: CARDIOLOGY CLINIC | Age: 72
End: 2022-06-30
Payer: MEDICARE

## 2022-06-30 VITALS
HEIGHT: 68 IN | BODY MASS INDEX: 40.47 KG/M2 | HEART RATE: 56 BPM | WEIGHT: 267 LBS | DIASTOLIC BLOOD PRESSURE: 68 MMHG | SYSTOLIC BLOOD PRESSURE: 108 MMHG

## 2022-06-30 DIAGNOSIS — I25.10 CORONARY ARTERY CALCIFICATION OF NATIVE ARTERY: ICD-10-CM

## 2022-06-30 DIAGNOSIS — E78.5 HYPERLIPIDEMIA, UNSPECIFIED HYPERLIPIDEMIA TYPE: ICD-10-CM

## 2022-06-30 DIAGNOSIS — I34.0 MILD MITRAL REGURGITATION BY PRIOR ECHOCARDIOGRAM: ICD-10-CM

## 2022-06-30 DIAGNOSIS — N39.41 URGE INCONTINENCE OF URINE: ICD-10-CM

## 2022-06-30 DIAGNOSIS — I48.0 PAROXYSMAL ATRIAL FIBRILLATION (HCC): Primary | ICD-10-CM

## 2022-06-30 DIAGNOSIS — N32.81 OAB (OVERACTIVE BLADDER): Primary | ICD-10-CM

## 2022-06-30 DIAGNOSIS — I25.84 CORONARY ARTERY CALCIFICATION OF NATIVE ARTERY: ICD-10-CM

## 2022-06-30 LAB
BILIRUBIN, URINE, POC: NEGATIVE
BLOOD URINE, POC: NORMAL
GLUCOSE URINE, POC: NEGATIVE
KETONES, URINE, POC: NEGATIVE
LEUKOCYTE ESTERASE, URINE, POC: NEGATIVE
NITRITE, URINE, POC: NEGATIVE
PH, URINE, POC: 6 (ref 4.6–8)
PROTEIN,URINE, POC: NEGATIVE
SPECIFIC GRAVITY, URINE, POC: 1.02 (ref 1–1.03)
URINALYSIS CLARITY, POC: NORMAL
URINALYSIS COLOR, POC: NORMAL
UROBILINOGEN, POC: NORMAL

## 2022-06-30 PROCEDURE — 0509F URINE INCON PLAN DOCD: CPT | Performed by: NURSE PRACTITIONER

## 2022-06-30 PROCEDURE — 99214 OFFICE O/P EST MOD 30 MIN: CPT | Performed by: INTERNAL MEDICINE

## 2022-06-30 PROCEDURE — G8399 PT W/DXA RESULTS DOCUMENT: HCPCS | Performed by: NURSE PRACTITIONER

## 2022-06-30 PROCEDURE — 1123F ACP DISCUSS/DSCN MKR DOCD: CPT | Performed by: NURSE PRACTITIONER

## 2022-06-30 PROCEDURE — G8399 PT W/DXA RESULTS DOCUMENT: HCPCS | Performed by: INTERNAL MEDICINE

## 2022-06-30 PROCEDURE — 81003 URINALYSIS AUTO W/O SCOPE: CPT | Performed by: NURSE PRACTITIONER

## 2022-06-30 PROCEDURE — 1036F TOBACCO NON-USER: CPT | Performed by: INTERNAL MEDICINE

## 2022-06-30 PROCEDURE — G8427 DOCREV CUR MEDS BY ELIG CLIN: HCPCS | Performed by: INTERNAL MEDICINE

## 2022-06-30 PROCEDURE — 3017F COLORECTAL CA SCREEN DOC REV: CPT | Performed by: INTERNAL MEDICINE

## 2022-06-30 PROCEDURE — 99213 OFFICE O/P EST LOW 20 MIN: CPT | Performed by: NURSE PRACTITIONER

## 2022-06-30 PROCEDURE — G8417 CALC BMI ABV UP PARAM F/U: HCPCS | Performed by: NURSE PRACTITIONER

## 2022-06-30 PROCEDURE — 1123F ACP DISCUSS/DSCN MKR DOCD: CPT | Performed by: INTERNAL MEDICINE

## 2022-06-30 PROCEDURE — 1090F PRES/ABSN URINE INCON ASSESS: CPT | Performed by: INTERNAL MEDICINE

## 2022-06-30 PROCEDURE — G8417 CALC BMI ABV UP PARAM F/U: HCPCS | Performed by: INTERNAL MEDICINE

## 2022-06-30 PROCEDURE — 1090F PRES/ABSN URINE INCON ASSESS: CPT | Performed by: NURSE PRACTITIONER

## 2022-06-30 PROCEDURE — G8428 CUR MEDS NOT DOCUMENT: HCPCS | Performed by: NURSE PRACTITIONER

## 2022-06-30 PROCEDURE — 1036F TOBACCO NON-USER: CPT | Performed by: NURSE PRACTITIONER

## 2022-06-30 PROCEDURE — 3017F COLORECTAL CA SCREEN DOC REV: CPT | Performed by: NURSE PRACTITIONER

## 2022-06-30 RX ORDER — BENZONATATE 100 MG/1
CAPSULE ORAL
COMMUNITY
Start: 2022-06-22

## 2022-06-30 RX ORDER — DOXYCYCLINE 100 MG/1
CAPSULE ORAL
COMMUNITY
Start: 2022-06-22 | End: 2022-10-24 | Stop reason: ALTCHOICE

## 2022-06-30 RX ORDER — ROSUVASTATIN CALCIUM 10 MG/1
10 TABLET, COATED ORAL DAILY
COMMUNITY
End: 2022-09-27 | Stop reason: SDUPTHER

## 2022-06-30 ASSESSMENT — ENCOUNTER SYMPTOMS
BACK PAIN: 0
NAUSEA: 0

## 2022-06-30 NOTE — PROGRESS NOTES
[facility]  [address] [place] [address], [address]  [id number]  [address]  : 1950    Chief Complaint   Patient presents with    Follow-up          HPI     Monalisa Santos is a 67 y.o. female followed by me for urinary urge incontinence and OAB. She has since had a permanent stimulator placed for her urinary issues. This was placed by Dr. Danny Vargas 2022. She reports she is much better. She is on program 3 at 0.2  She reports at night she continues to have occasional gush of urine. But she is up only once a night. During the day she is voiding at least every 2 weeks. Other significant history of lichen sclerosis. She is seeing someone in [facility] for this. She had a biopsy that confirmed this. In the past she has tried Vesicare, Detrol and Myrbetriq. None of those really helped the urgency and blood pressure was an issue with Myrbetriq. She is satisfied with her current state. Past Medical History:   Diagnosis Date    Acute cervical radiculopathy     Arthralgia of left shoulder region     Arthritis     fingers    Cervicalgia     Chest pain     pt denies CP or SOB    Chronic pain     right shoulder, sciatica right hip    Claustrophobia 2017    Coronary disease     Followed by [facility].  COVID-19 vaccine series completed     Pfizer vaccine completed X3 doses    Current use of long term anticoagulation     Eliquis and Aspirin    Depression     controlled with sertraline daily    Displacement of cervical intervertebral disc without myelopathy     Exogenous obesity     Extremity pain     Fibrocystic breast disease     Former cigarette smoker     GERD (gastroesophageal reflux disease)     controlled with omeprazole daily    H/O partial adrenalectomy ([address]     right adrenalectomy (benign tumor)    History of bone density study 2017    Dexa 2017:  Wnl.  10 year fracture risk (FRAX score):   Any Fracture:  7.9%  Hip fracture:  0.7%    History of colonoscopy 2017    Colonoscopy current (2017 Dr. Huber Mendez --> diverticulitis and tortuous colon, R 10 y). 2018 f/u ([place] noncardiac CP, likely esophageal spasm). Pt cxd EGD. [facility], APRN.  -->resched EGD w/ Dr. Huber Mendez (see [facility] media\" 2/7/2020 \"Referral order/[facility]\" office note)      History of Helicobacter pylori infection 9/2014    treated with 2 different antibiotics and PPI    HNP (herniated nucleus pulposus), lumbar     Lumbar radiculopathy     Morbid obesity ([address]     BMI 40.7    Nausea & vomiting     pt does well with antiemetic    Neck sprain     & STRAIN    Overactive bladder     Paroxysmal atrial fibrillation ([address]     Spinal stenosis, cervical region     Urge incontinence      Past Surgical History:   Procedure Laterality Date    BREAST LUMPECTOMY Left 12/12/2014    LEFT BREAST DUCTAL EXCISION @ 12 O'CLOCK  Vianney France MD at [facility] MA[facility] OR, Left breast, 11:30 position, 5 cm  from nipple, core biopsy: Fibrocystic mastopathy having minimal intraductal hyperplasia and apocrine metaplasia.      BREAST SURGERY Left 1999    breast abscess    CHOLECYSTECTOMY  1980    COLONOSCOPY      HYSTERECTOMY (CERVIX STATUS UNKNOWN)  1995    per pt retained her ovaries    ORTHOPEDIC SURGERY  2007    torn tendon right elbow    OTHER SURGICAL HISTORY      acdf    OTHER SURGICAL HISTORY  1994    right adrenalectomy (benign tumor)    UPPER GASTROINTESTINAL ENDOSCOPY      US GUIDED CORE BREAST BIOPSY       Current Outpatient Medications   Medication Sig Dispense Refill    benzonatate (TESSALON) 100 MG capsule TAKE 1 CAPSULE AS NEEDED BY MOUTH THREE TIMES A DAY AS NEEDED FOR COUGH FOR 10 DAYS      doxycycline monohydrate (MONODOX) 100 MG capsule TAKE 1 CAPSULE BY MOUTH EVERY 12 HOURS FOR 10 DAYS      rosuvastatin (CRESTOR) 10 MG tablet Take 10 mg by mouth daily      propafenone (RYTHMOL) 150 MG tablet Take 1 tablet by mouth 3 times daily 270 Transportation Needs:     Lack of Transportation (Medical): Not on file    Lack of Transportation (Non-Medical): Not on file   Physical Activity:     Days of Exercise per Week: Not on file    Minutes of Exercise per Session: Not on file   Stress:     Feeling of Stress : Not on file   Social Connections:     Frequency of Communication with Friends and Family: Not on file    Frequency of Social Gatherings with Friends and Family: Not on file    Attends Tenri[employer] Services: Not on file    Active Member of [address] [place] [address] [employer] or Organizations: Not on file    Attends Club or Organization Meetings: Not on file    Marital Status: Not on file   Intimate Partner Violence:     Fear of Current or Ex-Partner: Not on file    Emotionally Abused: Not on file    Physically Abused: Not on file    Sexually Abused: Not on file   Housing Stability:     Unable to Pay for Housing in the Last Year: Not on file    Number of [place] in the Last Year: Not on file    Unstable Housing in the Last Year: Not on file     Family History   Problem Relation Age of Onset    Heart Disease Brother     Lung Disease Mother         copd    Arrhythmia Mother         A. Fib    Heart Disease Brother        Review of Systems  Constitutional:   Negative for fever. GI:  Negative for nausea. Musculoskeletal:  Negative for back pain.       Urinalysis  UA - Dipstick  Results for orders placed or performed in visit on 06/30/22   AMB POC URINALYSIS DIP STICK AUTO W/O MICRO   Result Value Ref Range    Color (UA POC)      Clarity (UA POC)      Glucose, Urine, POC Negative Negative    Bilirubin, Urine, POC Negative Negative    KETONES, Urine, POC Negative Negative    Specific Gravity, Urine, POC 1.025 1.001 - 1.035    Blood (UA POC) Trace-lysed Negative    pH, Urine, POC 6.0 4.6 - 8.0    Protein, Urine, POC Negative Negative    Urobilinogen, POC 0.2 mg/dL     Nitrite, Urine, POC Negative Negative    Leukocyte Esterase, Urine, POC Negative Negative UA - Micro  WBC - 0  RBC - 0  Bacteria - 0  Epith - 0    PHYSICAL EXAM    GENERAL: No acute distress, Awake, Alert, Oriented X 3, Gait normal  ABDOMEN: soft, non tender, non-distended, positive bowel sounds, no organomegaly, no palpable masses, no guarding, no rebound tenderness  SKIN: No rash, no erythema, no lacerations or abrasions, no ecchymosis  MUSCULOSKELETAL - MAEW, no edema   Left Buttock incision is well healed. Pt has some mild tenderness occasionally. No redness. Assessment and Plan    ICD-10-CM    1. OAB (overactive bladder)  N32.81 AMB POC URINALYSIS DIP STICK AUTO W/O MICRO   2. Urge incontinence of urine  N39.41      PLAN:  Patient is doing well with InterStim  Follow-up in 6 months  Sooner if any symptoms change. May need to be evaluated by [employer] rep yearly. Chloe Guillen NP, APRN - NP  Dr. Lencho Olivera is supervising physician today and he approves plan of care. Return in about 6 months (around 12/30/2022) for for recheck. Elements of this note have been dictated using speech recognition software. Although reviewed, errors of speech recognition may have occurred.

## 2022-07-11 ENCOUNTER — INITIAL CONSULT (OUTPATIENT)
Dept: CARDIOLOGY CLINIC | Age: 72
End: 2022-07-11
Payer: MEDICARE

## 2022-07-11 VITALS
BODY MASS INDEX: 40.81 KG/M2 | SYSTOLIC BLOOD PRESSURE: 120 MMHG | DIASTOLIC BLOOD PRESSURE: 70 MMHG | WEIGHT: 269.3 LBS | HEIGHT: 68 IN | HEART RATE: 55 BPM

## 2022-07-11 DIAGNOSIS — I25.84 CORONARY ARTERY CALCIFICATION OF NATIVE ARTERY: ICD-10-CM

## 2022-07-11 DIAGNOSIS — E66.9 OBESITY (BMI 30-39.9): ICD-10-CM

## 2022-07-11 DIAGNOSIS — I34.0 MILD MITRAL REGURGITATION BY PRIOR ECHOCARDIOGRAM: ICD-10-CM

## 2022-07-11 DIAGNOSIS — K22.4 ESOPHAGEAL SPASM: ICD-10-CM

## 2022-07-11 DIAGNOSIS — I48.0 PAROXYSMAL ATRIAL FIBRILLATION (HCC): Primary | ICD-10-CM

## 2022-07-11 DIAGNOSIS — I25.10 CORONARY ARTERY CALCIFICATION OF NATIVE ARTERY: ICD-10-CM

## 2022-07-11 PROCEDURE — G8417 CALC BMI ABV UP PARAM F/U: HCPCS | Performed by: INTERNAL MEDICINE

## 2022-07-11 PROCEDURE — 3017F COLORECTAL CA SCREEN DOC REV: CPT | Performed by: INTERNAL MEDICINE

## 2022-07-11 PROCEDURE — G8427 DOCREV CUR MEDS BY ELIG CLIN: HCPCS | Performed by: INTERNAL MEDICINE

## 2022-07-11 PROCEDURE — G8399 PT W/DXA RESULTS DOCUMENT: HCPCS | Performed by: INTERNAL MEDICINE

## 2022-07-11 PROCEDURE — 1090F PRES/ABSN URINE INCON ASSESS: CPT | Performed by: INTERNAL MEDICINE

## 2022-07-11 PROCEDURE — 1036F TOBACCO NON-USER: CPT | Performed by: INTERNAL MEDICINE

## 2022-07-11 PROCEDURE — 99204 OFFICE O/P NEW MOD 45 MIN: CPT | Performed by: INTERNAL MEDICINE

## 2022-07-11 PROCEDURE — 1123F ACP DISCUSS/DSCN MKR DOCD: CPT | Performed by: INTERNAL MEDICINE

## 2022-07-11 PROCEDURE — 93000 ELECTROCARDIOGRAM COMPLETE: CPT | Performed by: INTERNAL MEDICINE

## 2022-07-11 RX ORDER — AMOXICILLIN AND CLAVULANATE POTASSIUM 875; 125 MG/1; MG/1
TABLET, FILM COATED ORAL
COMMUNITY
Start: 2022-07-05 | End: 2022-10-24 | Stop reason: ALTCHOICE

## 2022-07-11 ASSESSMENT — ENCOUNTER SYMPTOMS
ALLERGIC/IMMUNOLOGIC NEGATIVE: 1
EYES NEGATIVE: 1
SHORTNESS OF BREATH: 1
GASTROINTESTINAL NEGATIVE: 1

## 2022-07-11 NOTE — PROGRESS NOTES
[facility] CARDIOLOGY  [address] [facility], [address] [facility], [address] [place] [address]  PHONE: [phone]        22      NAME:  Jj Esquivel  :   MRN: [id number]     Referring Cardiologist: Ibrahima De La Torre MD    Reason for Consultation: Atrial fibrillation     ASSESSMENT and PLAN:  Shaq Jiménez was seen today for atrial fibrillation. Diagnoses and all orders for this visit:    Paroxysmal atrial fibrillation (HCC)    Coronary artery calcification of native artery    Mild mitral regurgitation by prior echocardiogram    Esophageal spasm    Obesity (BMI 30-39.9)    67year old female with mildly persistent atrial fibrillation here for an EP evaluation. She appears to have AF but there are no easy to find records/tracings of her being in atrial fibrillation. I would like to verify this diagnosis prior to offering more options for her atrial fibrillation mgmt including a potential AF ablation. We did discuss even if a 2 week monitor does not capture AF, even an Apple watch strip can be printed out and should be acceptable. For now, will continue current therapies and decide on mgmt in the near follow up interval.     -AF - continue current therapies. Ideally would like to verify diagnosis with a monitor.   -Routine cardiac care per Dr. Laureano Viera. -EP follow up in 3 months or PRN. Patient has been instructed and agrees to call our office with any issues or other concerns related to their cardiac condition(s) and/or complaint(s). No follow-up provider specified. Thank you for allowing me to participate in the electrophysiologic care of Ms. Jj Esquivel. Please contact me if any questions or concerns were to arise. Juan Junior MD, MS  Clinical Cardiac Electrophysiology  [facility]  22  9:13 AM    ===================================================================  Chief Complant:    Chief Complaint   Patient presents with    Atrial Fibrillation        Consultation rosuvastatin (CRESTOR) 10 MG tablet Take 10 mg by mouth daily      propafenone (RYTHMOL) 150 MG tablet Take 1 tablet by mouth 3 times daily 270 tablet 1    apixaban (ELIQUIS) 5 MG TABS tablet Take 5 mg by mouth 2 times daily      vitamin D 25 MCG (1000 UT) CAPS Take by mouth daily      famotidine (PEPCID) 20 MG tablet Take 20 mg by mouth daily      metoprolol succinate (TOPROL XL) 25 MG extended release tablet Take 25 mg by mouth daily      omeprazole (PRILOSEC) 40 MG delayed release capsule Take 40 mg by mouth 2 times daily      sertraline (ZOLOFT) 50 MG tablet Take 50 mg by mouth      benzonatate (TESSALON) 100 MG capsule TAKE 1 CAPSULE AS NEEDED BY MOUTH THREE TIMES A DAY AS NEEDED FOR COUGH FOR 10 DAYS (Patient not taking: Reported on 7/11/2022)      doxycycline monohydrate (MONODOX) 100 MG capsule TAKE 1 CAPSULE BY MOUTH EVERY 12 HOURS FOR 10 DAYS (Patient not taking: Reported on 7/11/2022)       No current facility-administered medications for this visit. Allergies   Allergen Reactions    Codeine Nausea Only     \"horrible stomach pain\"  Other reaction(s): nausea and vomiting, Nausea and/or vomiting-Intolerance  \"horrible stomach pain\"      Other      Other reaction(s): Unknown    Oxycodone Hcl      Other reaction(s): nausea and vomiting    Hydrocodone-Acetaminophen Nausea And Vomiting    Oxycodone Nausea And Vomiting       Past Medical History:   Diagnosis Date    Acute cervical radiculopathy     Arthralgia of left shoulder region     Arthritis     fingers    Cervicalgia     Chest pain     pt denies CP or SOB    Chronic pain     right shoulder, sciatica right hip    Claustrophobia 2/21/2017    Coronary disease     Followed by [facility].     COVID-19 vaccine series completed     Pfizer vaccine completed X3 doses    Current use of long term anticoagulation     Eliquis and Aspirin    Depression     controlled with sertraline daily    Displacement of cervical intervertebral disc without myelopathy     Exogenous obesity     Extremity pain     Fibrocystic breast disease     Former cigarette smoker     GERD (gastroesophageal reflux disease)     controlled with omeprazole daily    H/O partial adrenalectomy ([address]     right adrenalectomy (benign tumor)    History of bone density study 2017    Dexa 2017:  Wnl.  10 year fracture risk (FRAX score): Any Fracture:  7.9%  Hip fracture:  0.7%    History of colonoscopy 2017    Colonoscopy current (2017 Dr. Nila Banks --> diverticulitis and tortuous colon, R 10 y). 2018 f/u ([place] noncardiac CP, likely esophageal spasm). Pt cxd EGD. [facility] Geovanna Kolb, APRN.  -->resched EGD w/ Dr. Nila Banks (see [facility] media\" 2/7/2020 \"Referral order/[facility]\" office note)      History of Helicobacter pylori infection 9/2014    treated with 2 different antibiotics and PPI    HNP (herniated nucleus pulposus), lumbar     Lumbar radiculopathy     Morbid obesity ([address]     BMI 40.7    Nausea & vomiting     pt does well with antiemetic    Neck sprain     & STRAIN    Overactive bladder     Paroxysmal atrial fibrillation ([address]     Spinal stenosis, cervical region     Urge incontinence      Past Surgical History:   Procedure Laterality Date    BREAST LUMPECTOMY Left 12/12/2014    LEFT BREAST DUCTAL EXCISION @ 12 O'CLOCK  Aayush Cabral MD at [facility] MAIN OR, Left breast, 11:30 position, 5 cm  from nipple, core biopsy: Fibrocystic mastopathy having minimal intraductal hyperplasia and apocrine metaplasia.      BREAST SURGERY Left 1999    breast abscess    CHOLECYSTECTOMY  1980    COLONOSCOPY      HYSTERECTOMY (CERVIX STATUS UNKNOWN)  1995    per pt retained her ovaries    ORTHOPEDIC SURGERY  2007    torn tendon right elbow    OTHER SURGICAL HISTORY      acdf    OTHER SURGICAL HISTORY  1994    right adrenalectomy (benign tumor)    UPPER GASTROINTESTINAL ENDOSCOPY      US GUIDED CORE BREAST BIOPSY       Family History   Problem Relation Age of Onset  Heart Disease Brother     Lung Disease Mother         copd    Arrhythmia Mother         A. Fib    Heart Disease Brother      Social History     Tobacco Use    Smoking status: Former Smoker     Packs/day: 0.25     Quit date: 1990     Years since quittin.6    Smokeless tobacco: Never Used   Substance Use Topics    Alcohol use: Yes       ROS:  A comprehensive review of systems was performed with the pertinent positives and negatives as noted in the HPI in addition to:  Review of Systems   Constitutional: Negative. HENT: Negative. Eyes: Negative. Respiratory: Positive for shortness of breath. Cardiovascular: Positive for palpitations. Gastrointestinal: Negative. Endocrine: Negative. Genitourinary: Negative. Musculoskeletal: Negative. Skin: Negative. Allergic/Immunologic: Negative. Neurological: Negative. Hematological: Negative. Psychiatric/Behavioral: Negative. All other systems reviewed and are negative. PHYSICAL EXAM:   /70   Pulse 55   Ht 5' 7.5\" (1.715 m)   Wt 269 lb 4.8 oz (122.2 kg)   BMI 41.56 kg/m²      Wt Readings from Last 3 Encounters:   22 269 lb 4.8 oz (122.2 kg)   22 267 lb (121.1 kg)   22 260 lb (117.9 kg)     BP Readings from Last 3 Encounters:   22 120/70   22 108/68   21 132/80       Gen: Well appearing, well developed, no acute distress  Eyes: Pupils equal, round. Extraocular movements are intact  ENT: Oropharynx clear, no oral lesions, normal dentition  CV: S1S2, regular rate and rhythm, no murmurs, rubs or gallops, normal JVD, no carotid bruits, normal distal pulses, no RAINE  Pulm: Clear to auscultation bilaterally, no accessory muscle uses, no wheezes or rales  GI: Soft, NT, ND, +BS  Neuro: Alert and oriented, nonfocal  Psych: Appropriate affect  Skin: Normal color and skin turgor  MSK: Normal muscle bulk and tone    Medical problems and test results were reviewed with the patient today. No results found for any visits on 07/11/22.

## 2022-07-11 NOTE — PATIENT INSTRUCTIONS
Patient Education        Atrial Fibrillation: Care Instructions  Your Care Instructions     Atrial fibrillation is an irregular and often fast heartbeat. Treating this condition is important for several reasons. It can cause blood clots, which can travel from your heart to your brain and cause a stroke. If you have a fast heartbeat, you may feel lightheaded, dizzy, and weak. An irregular heartbeatcan also increase your risk for heart failure. Atrial fibrillation is often the result of another heart condition, such as high blood pressure or coronary artery disease. Making changes to improve yourheart condition will help you stay healthy and active. Follow-up care is a key part of your treatment and safety. Be sure to make and go to all appointments, and call your doctor if you are having problems. It's also a good idea to know your test results and keep alist of the medicines you take. How can you care for yourself at home? Medicines     Take your medicines exactly as prescribed. Call your doctor if you think you are having a problem with your medicine. You will get more details on the specific medicines your doctor prescribes.      If your doctor has given you a blood thinner to prevent a stroke, be sure you get instructions about how to take your medicine safely. Blood thinners can cause serious bleeding problems.      Do not take any vitamins, over-the-counter drugs, or herbal products without talking to your doctor first.   Lifestyle changes     Do not smoke. Smoking can increase your chance of a stroke and heart attack. If you need help quitting, talk to your doctor about stop-smoking programs and medicines. These can increase your chances of quitting for good.      Eat a heart-healthy diet.      Stay at a healthy weight. Lose weight if you need to.      Limit alcohol to 2 drinks a day for men and 1 drink a day for women. Too much alcohol can cause health problems.      Avoid colds and flu.  Get a pneumococcal vaccine shot. If you have had one before, ask your doctor whether you need another dose. Get a flu shot every year. If you must be around people with colds or flu, wash your hands often. Activity     If your doctor recommends it, get more exercise. Walking is a good choice. Bit by bit, increase the amount you walk every day. Try for at least 30 minutes on most days of the week. You also may want to swim, bike, or do other activities. Your doctor may suggest that you join a cardiac rehabilitation program so that you can have help increasing your physical activity safely.      Start light exercise if your doctor says it is okay. Even a small amount will help you get stronger, have more energy, and manage stress. Walking is an easy way to get exercise. Start out by walking a little more than you did in the hospital. Gradually increase the amount you walk.      When you exercise, watch for signs that your heart is working too hard. You are pushing too hard if you cannot talk while you are exercising. If you become short of breath or dizzy or have chest pain, sit down and rest immediately.      Check your pulse regularly. Place two fingers on the artery at the palm side of your wrist, in line with your thumb. If your heartbeat seems uneven or fast, talk to your doctor. When should you call for help? Call 911 anytime you think you may need emergency care. For example, call if:     You have symptoms of a heart attack. These may include:  ? Chest pain or pressure, or a strange feeling in the chest.  ? Sweating. ? Shortness of breath. ? Nausea or vomiting. ? Pain, pressure, or a strange feeling in the back, neck, jaw, or upper belly or in one or both shoulders or arms. ? Lightheadedness or sudden weakness. ? A fast or irregular heartbeat. After you call 911, the  may tell you to chew 1 adult-strength or 2 to 4 low-dose aspirin. Wait for an ambulance.  Do not try to drive yourself.      You have symptoms of a stroke. These may include:  ? Sudden numbness, tingling, weakness, or loss of movement in your face, arm, or leg, especially on only one side of your body. ? Sudden vision changes. ? Sudden trouble speaking. ? Sudden confusion or trouble understanding simple statements. ? Sudden problems with walking or balance. ? A sudden, severe headache that is different from past headaches.      You passed out (lost consciousness). Call your doctor now or seek immediate medical care if:     You have new or increased shortness of breath.      You feel dizzy or lightheaded, or you feel like you may faint.      Your heart rate becomes irregular.      You can feel your heart flutter in your chest or skip heartbeats. Tell your doctor if these symptoms are new or worse. Watch closely for changes in your health, and be sure to contact your doctor ifyou have any problems. Where can you learn more? Go to https://[employer]. org and sign in to your [employer] account. Enter U020 in the [employer] box to learn more about \"Atrial Fibrillation: Care Instructions. \"     If you do not have an account, please click on the \"Sign Up Now\" link. Current as of: January 10, 2022               Content Version: 13.3  © 2006-2022 Healthwise, Incorporated. Care instructions adapted under license by [facility] ([facility]). If you have questions about a medical condition or this instruction, always ask your healthcare professional. [place] [address] any warranty or liability for your use of this information. HTN (hypertension)

## 2022-08-04 RX ORDER — METOPROLOL SUCCINATE 25 MG/1
25 TABLET, EXTENDED RELEASE ORAL DAILY
Qty: 90 TABLET | Refills: 3 | Status: SHIPPED | OUTPATIENT
Start: 2022-08-04 | End: 2022-10-24 | Stop reason: SDUPTHER

## 2022-08-04 NOTE — TELEPHONE ENCOUNTER
MEDICATION REFILL REQUEST      Name of Medication:  Metoprolol succinate  Dose:  25 mg  Frequency:  1 a day  Quantity:  90  Days' supply:  90      Pharmacy Name/Location:  [id number]

## 2022-08-11 ENCOUNTER — OFFICE VISIT (OUTPATIENT)
Dept: UROLOGY | Age: 72
End: 2022-08-11
Payer: MEDICARE

## 2022-08-11 ENCOUNTER — TELEPHONE (OUTPATIENT)
Dept: UROLOGY | Age: 72
End: 2022-08-11

## 2022-08-11 DIAGNOSIS — N32.81 OAB (OVERACTIVE BLADDER): Primary | ICD-10-CM

## 2022-08-11 PROCEDURE — G8399 PT W/DXA RESULTS DOCUMENT: HCPCS | Performed by: UROLOGY

## 2022-08-11 PROCEDURE — 99213 OFFICE O/P EST LOW 20 MIN: CPT | Performed by: UROLOGY

## 2022-08-11 PROCEDURE — G8428 CUR MEDS NOT DOCUMENT: HCPCS | Performed by: UROLOGY

## 2022-08-11 PROCEDURE — 3017F COLORECTAL CA SCREEN DOC REV: CPT | Performed by: UROLOGY

## 2022-08-11 PROCEDURE — 1090F PRES/ABSN URINE INCON ASSESS: CPT | Performed by: UROLOGY

## 2022-08-11 PROCEDURE — 1123F ACP DISCUSS/DSCN MKR DOCD: CPT | Performed by: UROLOGY

## 2022-08-11 PROCEDURE — G8417 CALC BMI ABV UP PARAM F/U: HCPCS | Performed by: UROLOGY

## 2022-08-11 PROCEDURE — 1036F TOBACCO NON-USER: CPT | Performed by: UROLOGY

## 2022-08-11 NOTE — TELEPHONE ENCOUNTER
Pt was a rep stim appt on Dr. Mike Palomino schedule but the pt actually belongs to Dr. [place] Kuo .   Please have her f/u with  //fabiano

## 2022-08-11 NOTE — PROGRESS NOTES
Patient is status post InterStim back in March of this year. She is gone from using 3-5 thick pads per day down to 2. She is been having some pain above her battery. I did check her wound today and its healed perfectly. She does take Tylenol and this helps. We changed her program to #5 today. I did have a discussion with her regarding Botox which would be an option if she does not get an optimal result from InterStim. She will follow-up in 2 weeks. 20 minutes with this patient.

## 2022-08-29 ENCOUNTER — OFFICE VISIT (OUTPATIENT)
Dept: UROLOGY | Age: 72
End: 2022-08-29
Payer: MEDICARE

## 2022-08-29 DIAGNOSIS — R39.14 FEELING OF INCOMPLETE BLADDER EMPTYING: ICD-10-CM

## 2022-08-29 DIAGNOSIS — N39.41 URGE INCONTINENCE OF URINE: Primary | ICD-10-CM

## 2022-08-29 LAB
BILIRUBIN, URINE, POC: NEGATIVE
BLOOD URINE, POC: NEGATIVE
GLUCOSE URINE, POC: NEGATIVE
KETONES, URINE, POC: NEGATIVE
LEUKOCYTE ESTERASE, URINE, POC: NORMAL
NITRITE, URINE, POC: NEGATIVE
PH, URINE, POC: 5.5 (ref 4.6–8)
PROTEIN,URINE, POC: NEGATIVE
PVR, POC: NORMAL CC
SPECIFIC GRAVITY, URINE, POC: 1.02 (ref 1–1.03)
URINALYSIS CLARITY, POC: NORMAL
URINALYSIS COLOR, POC: NORMAL
UROBILINOGEN, POC: NORMAL

## 2022-08-29 PROCEDURE — G8427 DOCREV CUR MEDS BY ELIG CLIN: HCPCS | Performed by: UROLOGY

## 2022-08-29 PROCEDURE — G8399 PT W/DXA RESULTS DOCUMENT: HCPCS | Performed by: UROLOGY

## 2022-08-29 PROCEDURE — 51798 US URINE CAPACITY MEASURE: CPT | Performed by: UROLOGY

## 2022-08-29 PROCEDURE — G8417 CALC BMI ABV UP PARAM F/U: HCPCS | Performed by: UROLOGY

## 2022-08-29 PROCEDURE — 99213 OFFICE O/P EST LOW 20 MIN: CPT | Performed by: UROLOGY

## 2022-08-29 PROCEDURE — 0509F URINE INCON PLAN DOCD: CPT | Performed by: UROLOGY

## 2022-08-29 PROCEDURE — 3017F COLORECTAL CA SCREEN DOC REV: CPT | Performed by: UROLOGY

## 2022-08-29 PROCEDURE — 1090F PRES/ABSN URINE INCON ASSESS: CPT | Performed by: UROLOGY

## 2022-08-29 PROCEDURE — 81003 URINALYSIS AUTO W/O SCOPE: CPT | Performed by: UROLOGY

## 2022-08-29 PROCEDURE — 1036F TOBACCO NON-USER: CPT | Performed by: UROLOGY

## 2022-08-29 PROCEDURE — 1123F ACP DISCUSS/DSCN MKR DOCD: CPT | Performed by: UROLOGY

## 2022-08-29 ASSESSMENT — ENCOUNTER SYMPTOMS
EYES NEGATIVE: 1
RESPIRATORY NEGATIVE: 1

## 2022-08-29 NOTE — PROGRESS NOTES
[facility]  [address] [facility] [address], [address] [facility]  [address]  : 1950    Chief Complaint   Patient presents with    Follow-up          HPI     Nico Worthington is a 67 y.o.  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 Returns for follow up. Seen 2 weeks ago by interstim rep for worsening U/F/UUI. She was changed to program 5 at that time and today reports that she is [facility] better. Today, she reports 0 pads during the day and only 1 pad at night. This is down from 5 PPD prior to interstim placement. She is very pleased with results. Denies pain from interstim. No UTIs / hematuria. NO other concerns today. PVR 1 cc. Past Medical History:   Diagnosis Date    Acute cervical radiculopathy     Arthralgia of left shoulder region     Arthritis     fingers    Cervicalgia     Chest pain     pt denies CP or SOB    Chronic pain     right shoulder, sciatica right hip    Claustrophobia 2017    Coronary disease     Followed by [facility]. COVID-19 vaccine series completed     Pfizer vaccine completed X3 doses    Current use of long term anticoagulation     Eliquis and Aspirin    Depression     controlled with sertraline daily    Displacement of cervical intervertebral disc without myelopathy     Exogenous obesity     Extremity pain     Fibrocystic breast disease     Former cigarette smoker     GERD (gastroesophageal reflux disease)     controlled with omeprazole daily    H/O partial adrenalectomy ([address]     right adrenalectomy (benign tumor)    History of bone density study 2017    Dexa 2017:  Wnl.  10 year fracture risk (FRAX score): Any Fracture:  7.9%  Hip fracture:  0.7%    History of colonoscopy 2017    Colonoscopy current (2017 Dr. Merrilee Meckel --> diverticulitis and tortuous colon, R 10 y). 2018 f/u (Ecru noncardiac CP, likely esophageal spasm). Pt cxd EGD. 2019 [facility], Barbra Christine, APRN. -->resched EGD w/ Dr. Naheed Gipson (see [facility] media\" 2/7/2020 \"Referral order/[facility]\" office note)      History of Helicobacter pylori infection 9/2014    treated with 2 different antibiotics and PPI    HNP (herniated nucleus pulposus), lumbar     Lumbar radiculopathy     Morbid obesity ([address]     BMI 40.7    Nausea & vomiting     pt does well with antiemetic    Neck sprain     & STRAIN    Overactive bladder     Paroxysmal atrial fibrillation ([address]     Spinal stenosis, cervical region     Urge incontinence      Past Surgical History:   Procedure Laterality Date    BREAST LUMPECTOMY Left 12/12/2014    LEFT BREAST DUCTAL EXCISION @ 12 O'CLOCK  Mau Benites MD at [facility] MAIN OR, Left breast, 11:30 position, 5 cm  from nipple, core biopsy: Fibrocystic mastopathy having minimal intraductal hyperplasia and apocrine metaplasia. BREAST SURGERY Left 1999    breast abscess    CHOLECYSTECTOMY  1980    COLONOSCOPY      HYSTERECTOMY (CERVIX STATUS UNKNOWN)  1995    per pt retained her ovaries    ORTHOPEDIC SURGERY  2007    torn tendon right elbow    OTHER SURGICAL HISTORY      acdf    OTHER SURGICAL HISTORY  1994    right adrenalectomy (benign tumor)    UPPER GASTROINTESTINAL ENDOSCOPY      US GUIDED CORE BREAST BIOPSY       Current Outpatient Medications   Medication Sig Dispense Refill    metoprolol succinate (TOPROL XL) 25 MG extended release tablet Take 1 tablet by mouth in the morning.  90 tablet 3    benzonatate (TESSALON) 100 MG capsule TAKE 1 CAPSULE AS NEEDED BY MOUTH THREE TIMES A DAY AS NEEDED FOR COUGH FOR 10 DAYS      rosuvastatin (CRESTOR) 10 MG tablet Take 10 mg by mouth daily      propafenone (RYTHMOL) 150 MG tablet Take 1 tablet by mouth 3 times daily 270 tablet 1    apixaban (ELIQUIS) 5 MG TABS tablet Take 5 mg by mouth 2 times daily      vitamin D 25 MCG (1000 UT) CAPS Take by mouth daily      famotidine (PEPCID) 20 MG tablet Take 20 mg by mouth daily      omeprazole (PRILOSEC) 40 MG delayed release capsule Take 40 mg by mouth 2 times daily      sertraline (ZOLOFT) 50 MG tablet Take 50 mg by mouth      amoxicillin-clavulanate (AUGMENTIN) 875-125 MG per tablet TAKE 1 TABLET BY MOUTH EVERY 12 HOURS FOR 10 DAYS      doxycycline monohydrate (MONODOX) 100 MG capsule TAKE 1 CAPSULE BY MOUTH EVERY 12 HOURS FOR 10 DAYS       No current facility-administered medications for this visit. Allergies   Allergen Reactions    Codeine Nausea Only     \"horrible stomach pain\"  Other reaction(s): nausea and vomiting, Nausea and/or vomiting-Intolerance  \"horrible stomach pain\"      Other      Other reaction(s): Unknown    Oxycodone Hcl      Other reaction(s): nausea and vomiting    Hydrocodone-Acetaminophen Nausea And Vomiting    Oxycodone Nausea And Vomiting     Social History     Socioeconomic History    Marital status:      Spouse name: Not on file    Number of children: Not on file    Years of education: Not on file    Highest education level: Not on file   Occupational History    Not on file   Tobacco Use    Smoking status: Former     Packs/day: 0.25     Types: Cigarettes     Quit date: 1990     Years since quittin.7    Smokeless tobacco: Never   Substance and Sexual Activity    Alcohol use: Yes    Drug use: No    Sexual activity: Not on file     Comment: hysterectomy   Other Topics Concern    Not on file   Social History Narrative    Pt is primary caregiver for  who just suffered another stroke; going home later this week. IM:  Dr. Adkins Pass  Derm:  Dr. Inder Meyers.     10/2018 Pt's  .       Social Determinants of Health     Financial Resource Strain: Not on file   Food Insecurity: Not on file   Transportation Needs: Not on file   Physical Activity: Not on file   Stress: Not on file   Social Connections: Not on file   Intimate Partner Violence: Not on file   Housing Stability: Not on file     Family History   Problem Relation Age of Onset    Heart Disease Brother     Lung Disease Mother copd    Arrhythmia Mother         A. Fib    Heart Disease Brother        Review of Systems  Constitutional: Negative  Skin: Negative skin ROS  Eyes: Eyes negative  ENT: HENT negative  Respiratory: Respiratory negative  Cardiovascular: Neg cardio ROS  GI: Neg GI ROS  Genitourinary: Positive for incomplete emptying. Musculoskeletal: Positive for arthralgias. Neurological: Neg neuro ROS  Psychological: Neg psych ROS  Endocrine: Endocrine negative  Hem/Lymphatic: Hematologic/lymphatic negative    Urinalysis  UA - Dipstick  Results for orders placed or performed in visit on 08/29/22   AMB POC URINALYSIS DIP STICK AUTO W/O MICRO   Result Value Ref Range    Color (UA POC)      Clarity (UA POC)      Glucose, Urine, POC Negative Negative    Bilirubin, Urine, POC Negative Negative    KETONES, Urine, POC Negative Negative    Specific Gravity, Urine, POC 1.025 1.001 - 1.035    Blood (UA POC) Negative Negative    pH, Urine, POC 5.5 4.6 - 8.0    Protein, Urine, POC Negative Negative    Urobilinogen, POC 0.2 mg/dL     Nitrite, Urine, POC Negative Negative    Leukocyte Esterase, Urine, POC Trace Negative       There were no vitals taken for this visit. GENERAL: No acute distress, Awake, Alert, Oriented X 3, Gait normal  CARDIAC: regular rate and rhythm  CHEST AND LUNG: Easy work of breathing, clear to auscultation bilaterally, no cyanosis  ABDOMEN: soft, non tender, non-distended, positive bowel sounds, no organomegaly, no palpable masses, no guarding, no rebound tenderness  SKIN: No rash, no erythema, no lacerations or abrasions, no ecchymosis  NEUROLOGIC: cranial nerves 2-12 grossly intact     Assessment and Plan    ICD-10-CM    1. Urge incontinence of urine  N39.41 AMB POC URINALYSIS DIP STICK AUTO W/O MICRO     AMB POC PVR, ELISABET,POST-VOID RES,US,NON-IMAGING      2.  Feeling of incomplete bladder emptying  R39.14 AMB POC URINALYSIS DIP STICK AUTO W/O MICRO     AMB POC PVR, ELISABET,POST-VOID RES,US,NON-IMAGING        UUI: MUCH improved on program 5 today. Pleased with results. Will continue with this. Follow up 1 year or sooner if needed    I have spent 20 minutes today reviewing previous notes, test results and face to face with the patient as well as documenting. Philip A. Claudene Mixer, M.D.     [facility]  [place] [address] [address] [facility]  Phone: [phone]  Fax: [phone]

## 2022-09-26 ENCOUNTER — TELEPHONE (OUTPATIENT)
Dept: CARDIOLOGY CLINIC | Age: 72
End: 2022-09-26

## 2022-09-26 NOTE — TELEPHONE ENCOUNTER
Patient called stating she has been in constant Afib since Sunday PM at 6:30. She states a racing HR as well as SOB.

## 2022-09-26 NOTE — TELEPHONE ENCOUNTER
Pt states starting 6:30 yesterday, she went into afib. States took an extra metoprolol around 8:30 to slow the HR down. Took usual meds around 2 am. Finally converted this am, around 8:30. States rate was around 150-155 when it first started. Slowed down after metoprolol. Denies associated sx, except mild SOB when it first started. Pt concerned about how she should have managed. Reassured she managed appropriately. Instructed we recommend ER for rates that high if associated with CP, worsening SOB, dizziness, and/or n/v. Verb understanding. Please advise any further recommendations.

## 2022-09-27 RX ORDER — ROSUVASTATIN CALCIUM 10 MG/1
10 TABLET, COATED ORAL DAILY
Qty: 90 TABLET | Refills: 3 | Status: SHIPPED | OUTPATIENT
Start: 2022-09-27

## 2022-09-27 NOTE — TELEPHONE ENCOUNTER
Florida Grubbs MD  You 24 minutes ago (10:24 AM)     ZD  OK to refill      Triage called pt and informed her Dr. Chaparro Haines sent refills on eliquis and rosuvastatin to requested [facility] pharmacy. She verbalizes understanding.

## 2022-09-27 NOTE — TELEPHONE ENCOUNTER
MEDICATION REFILL REQUEST      Name of Medication:  Rosuvastatin   Dose:  10 mg  Frequency:    Quantity:    Days' supply:  90      Pharmacy Name/Location:

## 2022-09-27 NOTE — TELEPHONE ENCOUNTER
MEDICATION REFILL REQUEST      Name of Medication:  Eliquis   Dose:  5  Frequency:  twice a  day   Quantity:    Days' supply:  90      Pharmacy Name/Location:  [facility]

## 2022-09-28 ENCOUNTER — HOSPITAL ENCOUNTER (OUTPATIENT)
Dept: CT IMAGING | Age: 72
Discharge: HOME | End: 2022-10-01

## 2022-09-28 DIAGNOSIS — R05.3 PERSISTENT COUGH FOR 3 WEEKS OR LONGER: ICD-10-CM

## 2022-10-06 ENCOUNTER — APPOINTMENT (OUTPATIENT)
Dept: GENERAL RADIOLOGY | Age: 72
End: 2022-10-06
Payer: MEDICARE

## 2022-10-06 ENCOUNTER — TELEPHONE (OUTPATIENT)
Dept: CARDIOLOGY CLINIC | Age: 72
End: 2022-10-06

## 2022-10-06 ENCOUNTER — HOSPITAL ENCOUNTER (EMERGENCY)
Age: 72
Discharge: HOME | End: 2022-10-06
Attending: EMERGENCY MEDICINE
Payer: MEDICARE

## 2022-10-06 VITALS
BODY MASS INDEX: 38.58 KG/M2 | RESPIRATION RATE: 15 BRPM | DIASTOLIC BLOOD PRESSURE: 43 MMHG | SYSTOLIC BLOOD PRESSURE: 98 MMHG | OXYGEN SATURATION: 97 % | HEART RATE: 59 BPM | WEIGHT: 250 LBS | TEMPERATURE: 98 F

## 2022-10-06 DIAGNOSIS — I48.0 PAROXYSMAL ATRIAL FIBRILLATION (HCC): ICD-10-CM

## 2022-10-06 DIAGNOSIS — R00.2 PALPITATIONS: Primary | ICD-10-CM

## 2022-10-06 LAB
ALBUMIN SERPL-MCNC: 4 G/DL (ref 3.2–4.6)
ALBUMIN/GLOB SERPL: 1.5 {RATIO}
ALP SERPL-CCNC: 50 U/L (ref 45–117)
ALT SERPL-CCNC: 8 U/L (ref 13–61)
ANION GAP SERPL CALC-SCNC: 10 MMOL/L (ref 7–16)
AST SERPL-CCNC: 12 U/L (ref 15–37)
BASOPHILS # BLD: 0.1 K/UL (ref 0–0.2)
BASOPHILS NFR BLD: 1 % (ref 0–2)
BILIRUB SERPL-MCNC: 0.4 MG/DL (ref 0.2–1.1)
BUN SERPL-MCNC: 17 MG/DL (ref 7–18)
CALCIUM SERPL-MCNC: 9.3 MG/DL (ref 8.3–10.4)
CHLORIDE SERPL-SCNC: 110 MMOL/L (ref 98–107)
CO2 SERPL-SCNC: 27 MMOL/L (ref 21–32)
CREAT SERPL-MCNC: 0.84 MG/DL (ref 0.6–1)
DIFFERENTIAL METHOD BLD: NORMAL
EOSINOPHIL # BLD: 0.1 K/UL (ref 0–0.8)
EOSINOPHIL NFR BLD: 3 % (ref 0.5–7.8)
ERYTHROCYTE [DISTWIDTH] IN BLOOD BY AUTOMATED COUNT: 12.7 % (ref 11.9–14.6)
GLOBULIN SER CALC-MCNC: 2.6 G/DL (ref 2.3–3.5)
GLUCOSE SERPL-MCNC: 89 MG/DL (ref 65–100)
HCT VFR BLD AUTO: 43.2 % (ref 35.8–46.3)
HGB BLD-MCNC: 14.4 G/DL (ref 11.7–15.4)
IMM GRANULOCYTES # BLD AUTO: 0 K/UL (ref 0–0.5)
IMM GRANULOCYTES NFR BLD AUTO: 0 % (ref 0–5)
LIPASE SERPL-CCNC: 58 U/L (ref 13–60)
LYMPHOCYTES # BLD: 1.7 K/UL (ref 0.5–4.6)
LYMPHOCYTES NFR BLD: 33 % (ref 13–44)
MAGNESIUM SERPL-MCNC: 2 MG/DL (ref 1.2–2.6)
MCH RBC QN AUTO: 31 PG (ref 26.1–32.9)
MCHC RBC AUTO-ENTMCNC: 33.3 G/DL (ref 31.4–35)
MCV RBC AUTO: 92.9 FL (ref 79.6–97.8)
MONOCYTES # BLD: 0.4 K/UL (ref 0.1–1.3)
MONOCYTES NFR BLD: 8 % (ref 4–12)
NEUTS SEG # BLD: 3 K/UL (ref 1.7–8.2)
NEUTS SEG NFR BLD: 56 % (ref 43–78)
NRBC # BLD: 0 K/UL (ref 0–0.2)
NT PRO BNP: 1327 PG/ML (ref 0–450)
PLATELET # BLD AUTO: 198 K/UL (ref 150–450)
PMV BLD AUTO: 10.8 FL (ref 9.4–12.3)
POTASSIUM SERPL-SCNC: 4.3 MMOL/L (ref 3.5–5.1)
PROT SERPL-MCNC: 6.6 G/DL (ref 6.4–8.2)
RBC # BLD AUTO: 4.65 M/UL (ref 4.05–5.2)
SODIUM SERPL-SCNC: 147 MMOL/L (ref 136–145)
TROPONIN T SERPL HS-MCNC: 7.2 NG/L (ref 0–14)
WBC # BLD AUTO: 5.3 K/UL (ref 4.3–11.1)

## 2022-10-06 PROCEDURE — 84484 ASSAY OF TROPONIN QUANT: CPT

## 2022-10-06 PROCEDURE — 2580000003 HC RX 258: Performed by: EMERGENCY MEDICINE

## 2022-10-06 PROCEDURE — 99285 EMERGENCY DEPT VISIT HI MDM: CPT

## 2022-10-06 PROCEDURE — 85025 COMPLETE CBC W/AUTO DIFF WBC: CPT

## 2022-10-06 PROCEDURE — 83690 ASSAY OF LIPASE: CPT

## 2022-10-06 PROCEDURE — 80053 COMPREHEN METABOLIC PANEL: CPT

## 2022-10-06 PROCEDURE — 96375 TX/PRO/DX INJ NEW DRUG ADDON: CPT

## 2022-10-06 PROCEDURE — 71045 X-RAY EXAM CHEST 1 VIEW: CPT

## 2022-10-06 PROCEDURE — 83735 ASSAY OF MAGNESIUM: CPT

## 2022-10-06 PROCEDURE — 83880 ASSAY OF NATRIURETIC PEPTIDE: CPT

## 2022-10-06 PROCEDURE — 96374 THER/PROPH/DIAG INJ IV PUSH: CPT

## 2022-10-06 PROCEDURE — 2500000003 HC RX 250 WO HCPCS: Performed by: EMERGENCY MEDICINE

## 2022-10-06 RX ORDER — 0.9 % SODIUM CHLORIDE 0.9 %
500 INTRAVENOUS SOLUTION INTRAVENOUS ONCE
Status: COMPLETED | OUTPATIENT
Start: 2022-10-06 | End: 2022-10-06

## 2022-10-06 RX ORDER — LABETALOL HYDROCHLORIDE 5 MG/ML
10 INJECTION, SOLUTION INTRAVENOUS
Status: COMPLETED | OUTPATIENT
Start: 2022-10-06 | End: 2022-10-06

## 2022-10-06 RX ORDER — DILTIAZEM HYDROCHLORIDE 5 MG/ML
10 INJECTION INTRAVENOUS
Status: COMPLETED | OUTPATIENT
Start: 2022-10-06 | End: 2022-10-06

## 2022-10-06 RX ADMIN — DILTIAZEM HYDROCHLORIDE 10 MG: 5 INJECTION, SOLUTION INTRAVENOUS at 17:34

## 2022-10-06 RX ADMIN — LABETALOL HYDROCHLORIDE 10 MG: 5 INJECTION INTRAVENOUS at 16:35

## 2022-10-06 RX ADMIN — SODIUM CHLORIDE 500 ML: 900 INJECTION, SOLUTION INTRAVENOUS at 16:35

## 2022-10-06 ASSESSMENT — ENCOUNTER SYMPTOMS
SHORTNESS OF BREATH: 1
GASTROINTESTINAL NEGATIVE: 1
CHEST TIGHTNESS: 0

## 2022-10-06 ASSESSMENT — PAIN - FUNCTIONAL ASSESSMENT: PAIN_FUNCTIONAL_ASSESSMENT: NONE - DENIES PAIN

## 2022-10-06 ASSESSMENT — PAIN SCALES - GENERAL: PAINLEVEL_OUTOF10: 3

## 2022-10-06 NOTE — ED TRIAGE NOTES
Patient felt fluttering in her chest last night and then it resolved. Patient has known a-fib and it is controlled with medication. Patient states her heart rate went low (does not know how low) and then really high in the 130's. Patient called PCP and they advised her to come to ER.

## 2022-10-06 NOTE — ED NOTES
I have reviewed discharge instructions with the patient and caregiver. The patient and caregiver verbalized understanding. Patient left ED via Discharge Method: ambulatory to Home with family. Opportunity for questions and clarification provided. Patient given 0 scripts. To continue your aftercare when you leave the hospital, you may receive an automated call from our care team to check in on how you are doing. This is a free service and part of our promise to provide the best care and service to meet your aftercare needs.  If you have questions, or wish to unsubscribe from this service please call [phone]. Thank you for Choosing our [facility] Emergency Department.         Lesia Rangel RN  10/06/22 3333

## 2022-10-06 NOTE — TELEPHONE ENCOUNTER
Patient states she has been in aFib since 3 am this morning. Patient would just like to know what to do. Please call.

## 2022-10-06 NOTE — DISCHARGE INSTRUCTIONS
Take medicines as prescribed. Follow-up with cardiology as scheduled. Return to the emergency department for any concerns.

## 2022-10-06 NOTE — ED PROVIDER NOTES
Emergency Department Provider Note                   PCP:                LILI Lock NP               Age: 67 y.o. Sex: female       ICD-10-CM    1. Palpitations  R00.2       2. Paroxysmal atrial fibrillation (HCC)  I48.0           DISPOSITION Decision To Discharge 10/06/2022 05:58:35 PM       MDM  Number of Diagnoses or Management Options  Diagnosis management comments: 69-year-old  female with history of paroxysmal A. fib presented emergency department with palpitations and intermittent atrial fibrillation. Patient's labs are reassuring. Patient was given a dose of labetalol and dose of Cardizem in the emergency department with resolution of her tachycardia. Patient's labs are reassuring and patient feels much better. Patient has a scheduled appointment to follow-up with cardiology coming up later this month. Patient will return to the emergency department for any concerns. Amount and/or Complexity of Data Reviewed  Clinical lab tests: ordered and reviewed  Tests in the radiology section of CPT®: ordered and reviewed  Decide to obtain previous medical records or to obtain history from someone other than the patient: yes  Obtain history from someone other than the patient: yes  Review and summarize past medical records: yes  Independent visualization of images, tracings, or specimens: yes    Patient Progress  Patient progress: resolved       ED Course as of 10/06/22 1759   Thu Oct 06, 2022   1728 Patient with minimal heart rate improvement after labetalol. Will give Cardizem. [JL]   5573 Patient back in her normal rhythm after Cardizem 10 mg. Patient feels much better. Patient will be discharged home.  [JL]      ED Course User Index  [JL] Melinda Jackson MD        Orders Placed This Encounter   Procedures    XR CHEST PORTABLE    CBC with Auto Differential    CMP    Magnesium    Lipase    Brain Natriuretic Peptide    Troponin T    Cardiac Monitor - ED Only    EKG 12 Lead    EKG 12 Lead    Saline lock IV        Medications   0.9 % sodium chloride bolus (500 mLs IntraVENous New Bag 10/6/22 1635)   labetalol (NORMODYNE;TRANDATE) injection 10 mg (10 mg IntraVENous Given 10/6/22 1635)   dilTIAZem injection 10 mg (10 mg IntraVENous Given 10/6/22 1734)       New Prescriptions    No medications on file        Hilton Mcnair is a 67 y.o. female who presents to the Emergency Department with chief complaint of    Chief Complaint   Patient presents with    Atrial Fibrillation    Shortness of Breath      65-year-old  female with history of paroxysmal A. fib with chronic anticoagulation presented to the emergency department with palpitations that began at approximately 3:00 this morning. Patient called her cardiologist and told them about her symptoms and it was recommended the patient come to the emergency department for evaluation. Patient states that she usually knows when she goes in and out of atrial fibrillation. She woke up this morning and took her normal metoprolol and Rythmol. She then took another dose of Rythmol around lunchtime and noted that her heart rate was around 140 at that time. Patient states that she did notice that she converted back into sinus rhythm and her heart rate went slow initially but then went up into the 120s and 130s. She states that when her heart rate goes up significantly she becomes short of breath. She denies any chest pain, cough, congestion, fever, chills, abdominal pain, nausea, vomiting, diarrhea or any other concerns. Patient is scheduled to see the EP doctor later this month to discuss possible ablation. Patient states that her symptoms have improved from when they first began but she does still have slight palpitations and slight shortness of breath with exertion. The history is provided by the patient and medical records.      All other systems reviewed and are negative unless otherwise stated in the history of present ([facility] [address]     BMI 40.7    Nausea & vomiting     pt does well with antiemetic    Neck sprain     & STRAIN    Overactive bladder     Paroxysmal atrial fibrillation ([address]     Spinal stenosis, cervical region     Urge incontinence         Past Surgical History:   Procedure Laterality Date    BREAST LUMPECTOMY Left 2014    LEFT BREAST DUCTAL EXCISION @ 12 O'CLOCK  Erica Tran MD at [facility] MAIN OR, Left breast, 11:30 position, 5 cm  from nipple, core biopsy: Fibrocystic mastopathy having minimal intraductal hyperplasia and apocrine metaplasia. BREAST SURGERY Left     breast abscess    CHOLECYSTECTOMY      COLONOSCOPY      HYSTERECTOMY (CERVIX STATUS UNKNOWN)      per pt retained her ovaries    ORTHOPEDIC SURGERY      torn tendon right elbow    OTHER SURGICAL HISTORY      acdf    OTHER SURGICAL HISTORY      right adrenalectomy (benign tumor)    UPPER GASTROINTESTINAL ENDOSCOPY      US GUIDED CORE BREAST BIOPSY          Family History   Problem Relation Age of Onset    Heart Disease Brother     Lung Disease Mother         copd    Arrhythmia Mother         A. Fib    Heart Disease Brother         Social History     Socioeconomic History    Marital status:      Spouse name: None    Number of children: None    Years of education: None    Highest education level: None   Tobacco Use    Smoking status: Former     Packs/day: 0.25     Types: Cigarettes     Quit date: 1990     Years since quittin.8    Smokeless tobacco: Never   Substance and Sexual Activity    Alcohol use: Yes    Drug use: No   Social History Narrative    Pt is primary caregiver for  who just suffered another stroke; going home later this week. IM:  Dr. Moncho Falcon  Derm:  Dr. Amrik Escalante.     10/2018 Pt's  .          Allergies: Codeine, Other, Oxycodone hcl, Hydrocodone-acetaminophen, and Oxycodone    Previous Medications    AMOXICILLIN-CLAVULANATE (AUGMENTIN) 875-125 MG PER TABLET    TAKE 1 TABLET BY MOUTH EVERY 12 HOURS FOR 10 DAYS    APIXABAN (ELIQUIS) 5 MG TABS TABLET    Take 1 tablet by mouth 2 times daily    BENZONATATE (TESSALON) 100 MG CAPSULE    TAKE 1 CAPSULE AS NEEDED BY MOUTH THREE TIMES A DAY AS NEEDED FOR COUGH FOR 10 DAYS    DOXYCYCLINE MONOHYDRATE (MONODOX) 100 MG CAPSULE    TAKE 1 CAPSULE BY MOUTH EVERY 12 HOURS FOR 10 DAYS    FAMOTIDINE (PEPCID) 20 MG TABLET    Take 20 mg by mouth daily    METOPROLOL SUCCINATE (TOPROL XL) 25 MG EXTENDED RELEASE TABLET    Take 1 tablet by mouth in the morning. OMEPRAZOLE (PRILOSEC) 40 MG DELAYED RELEASE CAPSULE    Take 40 mg by mouth 2 times daily    PROPAFENONE (RYTHMOL) 150 MG TABLET    Take 1 tablet by mouth 3 times daily    ROSUVASTATIN (CRESTOR) 10 MG TABLET    Take 1 tablet by mouth daily    SERTRALINE (ZOLOFT) 50 MG TABLET    Take 50 mg by mouth    VITAMIN D 25 MCG (1000 UT) CAPS    Take by mouth daily        Vitals signs and nursing note reviewed. Patient Vitals for the past 4 hrs:   Temp Pulse Resp BP SpO2   10/06/22 1734 -- (!) 115 20 132/65 96 %   10/06/22 1721 -- (!) 115 14 130/72 96 %   10/06/22 1630 -- (!) 117 18 133/79 96 %   10/06/22 1620 -- (!) 118 26 115/74 95 %   10/06/22 1616 97.8 °F (36.6 °C) (!) 118 19 (!) 139/119 97 %          Physical Exam  Vitals and nursing note reviewed. Constitutional:       General: She is not in acute distress. Appearance: She is well-developed. She is not ill-appearing or toxic-appearing. HENT:      Head: Normocephalic and atraumatic. Mouth/Throat:      Mouth: Mucous membranes are moist.   Eyes:      Extraocular Movements: Extraocular movements intact. Pupils: Pupils are equal, round, and reactive to light. Cardiovascular:      Rate and Rhythm: Regular rhythm. Tachycardia present. Pulmonary:      Effort: Pulmonary effort is normal.   Chest:      Chest wall: No tenderness. Musculoskeletal:         General: Normal range of motion. Cervical back: Normal range of motion.       Right lower leg: No tenderness. No edema. Left lower leg: No tenderness. No edema. Skin:     General: Skin is warm and dry. Neurological:      General: No focal deficit present. Mental Status: She is alert and oriented to person, place, and time. Psychiatric:         Mood and Affect: Mood normal.         Behavior: Behavior normal.            ED EKG Interpretation #1  EKG was interpreted in the absence of a cardiologist.    Rate: 118  EKG Interpretation: Sinus atrial tachycardia with abnormal R wave progression. Normal OR and QT intervals. ST Segments: Nonspecific ST segments - NO STEMI    ED EKG Interpretation #2  EKG was interpreted in the absence of a cardiologist.    Rate: 55  EKG Interpretation: Sinus bradycardia with normal OR and QT intervals. No ischemic changes. ST Segments: No ST abnormalities. Results for orders placed or performed during the hospital encounter of 10/06/22   XR CHEST PORTABLE    Narrative    Exam: XR CHEST PORTABLE on 10/6/2022 5:13 PM    Clinical History: The Female patient is 67years old  presenting for  palpitations. Comparison:  Chest x-ray 6/29/2022    Findings:  Frontal view of the chest was obtained. There is questionable minimal groundglass infiltrate at the lung bases  accentuated by slightly shallow inspiration. No pleural effusions are  demonstrated. The cardiomediastinal silhouette is within normal limits. There  are no acute osseous abnormalities. Impression    1. Suggested minimal basilar groundglass infiltrates accentuated by shallow  inspiration.     CPT code(s) 71547           CBC with Auto Differential   Result Value Ref Range    WBC 5.3 4.3 - 11.1 K/uL    RBC 4.65 4.05 - 5.20 M/uL    Hemoglobin 14.4 11.7 - 15.4 g/dL    Hematocrit 43.2 35.8 - 46.3 %    MCV 92.9 79.6 - 97.8 FL    MCH 31.0 26.1 - 32.9 PG    MCHC 33.3 31.4 - 35.0 g/dL    RDW 12.7 11.9 - 14.6 %    Platelets 079 366 - 544 K/uL    MPV 10.8 9.4 - 12.3 FL    nRBC 0.00 0.0 - 0.2 K/uL Differential Type AUTOMATED      Seg Neutrophils 56 43 - 78 %    Lymphocytes 33 13 - 44 %    Monocytes 8 4.0 - 12.0 %    Eosinophils % 3 0.5 - 7.8 %    Basophils 1 0.0 - 2.0 %    Immature Granulocytes 0 0.0 - 5.0 %    Segs Absolute 3.0 1.7 - 8.2 K/UL    Absolute Lymph # 1.7 0.5 - 4.6 K/UL    Absolute Mono # 0.4 0.1 - 1.3 K/UL    Absolute Eos # 0.1 0.0 - 0.8 K/UL    Basophils Absolute 0.1 0.0 - 0.2 K/UL    Absolute Immature Granulocyte 0.0 0.0 - 0.5 K/UL   CMP   Result Value Ref Range    Sodium 147 (H) 136 - 145 mmol/L    Potassium 4.3 3.5 - 5.1 mmol/L    Chloride 110 (H) 98 - 107 mmol/L    CO2 27 21 - 32 mmol/L    Anion Gap 10 7.0 - 16.0 mmol/L    Glucose 89 65 - 100 mg/dL    BUN 17 7.0 - 18.0 MG/DL    Creatinine 0.84 0.6 - 1.0 MG/DL    Est, Glom Filt Rate >60 >60 ml/min/1.73m2    Calcium 9.3 8.3 - 10.4 MG/DL    Total Bilirubin 0.4 0.2 - 1.1 MG/DL    ALT 8 (L) 13.0 - 61.0 U/L    AST 12 (L) 15 - 37 U/L    Alk Phosphatase 50 45.0 - 117.0 U/L    Total Protein 6.6 6.4 - 8.2 g/dL    Albumin 4.0 3.2 - 4.6 g/dL    Globulin 2.6 2.3 - 3.5 g/dL    Albumin/Globulin Ratio 1.5     Magnesium   Result Value Ref Range    Magnesium 2.0 1.2 - 2.6 mg/dL   Lipase   Result Value Ref Range    Lipase 58 13 - 60 U/L   Brain Natriuretic Peptide   Result Value Ref Range    NT Pro-BNP 1,327 (H) 0 - 450 PG/ML   Troponin T   Result Value Ref Range    Troponin T 7.2 0 - 14 ng/L        XR CHEST PORTABLE   Final Result      1. Suggested minimal basilar groundglass infiltrates accentuated by shallow   inspiration. CPT code(s) 13139                                        Voice dictation software was used during the making of this note. This software is not perfect and grammatical and other typographical errors may be present. This note has not been completely proofread for errors.        Elvie Ortiz MD  10/06/22 0442

## 2022-10-24 ENCOUNTER — OFFICE VISIT (OUTPATIENT)
Dept: CARDIOLOGY CLINIC | Age: 72
End: 2022-10-24
Payer: MEDICARE

## 2022-10-24 VITALS
HEART RATE: 58 BPM | DIASTOLIC BLOOD PRESSURE: 72 MMHG | BODY MASS INDEX: 42.38 KG/M2 | WEIGHT: 270 LBS | HEIGHT: 67 IN | SYSTOLIC BLOOD PRESSURE: 110 MMHG

## 2022-10-24 DIAGNOSIS — R06.83 SNORING: Primary | ICD-10-CM

## 2022-10-24 DIAGNOSIS — I48.0 PAROXYSMAL ATRIAL FIBRILLATION (HCC): ICD-10-CM

## 2022-10-24 PROCEDURE — 93000 ELECTROCARDIOGRAM COMPLETE: CPT | Performed by: INTERNAL MEDICINE

## 2022-10-24 PROCEDURE — 1036F TOBACCO NON-USER: CPT | Performed by: INTERNAL MEDICINE

## 2022-10-24 PROCEDURE — 99215 OFFICE O/P EST HI 40 MIN: CPT | Performed by: INTERNAL MEDICINE

## 2022-10-24 PROCEDURE — G8399 PT W/DXA RESULTS DOCUMENT: HCPCS | Performed by: INTERNAL MEDICINE

## 2022-10-24 PROCEDURE — G8484 FLU IMMUNIZE NO ADMIN: HCPCS | Performed by: INTERNAL MEDICINE

## 2022-10-24 PROCEDURE — G8427 DOCREV CUR MEDS BY ELIG CLIN: HCPCS | Performed by: INTERNAL MEDICINE

## 2022-10-24 PROCEDURE — 1123F ACP DISCUSS/DSCN MKR DOCD: CPT | Performed by: INTERNAL MEDICINE

## 2022-10-24 PROCEDURE — 3017F COLORECTAL CA SCREEN DOC REV: CPT | Performed by: INTERNAL MEDICINE

## 2022-10-24 PROCEDURE — 1090F PRES/ABSN URINE INCON ASSESS: CPT | Performed by: INTERNAL MEDICINE

## 2022-10-24 PROCEDURE — G8417 CALC BMI ABV UP PARAM F/U: HCPCS | Performed by: INTERNAL MEDICINE

## 2022-10-24 RX ORDER — FUROSEMIDE 20 MG/1
20 TABLET ORAL DAILY
COMMUNITY
Start: 2022-10-19 | End: 2022-11-08 | Stop reason: SDUPTHER

## 2022-10-24 RX ORDER — PROPAFENONE HYDROCHLORIDE 150 MG/1
150 TABLET, FILM COATED ORAL 3 TIMES DAILY
Qty: 270 TABLET | Refills: 3 | Status: SHIPPED | OUTPATIENT
Start: 2022-10-24

## 2022-10-24 RX ORDER — METOPROLOL SUCCINATE 25 MG/1
25 TABLET, EXTENDED RELEASE ORAL DAILY
Qty: 90 TABLET | Refills: 3 | Status: SHIPPED | OUTPATIENT
Start: 2022-10-24

## 2022-10-24 RX ORDER — CETIRIZINE HYDROCHLORIDE 10 MG/1
1 TABLET ORAL
COMMUNITY

## 2022-10-24 ASSESSMENT — ENCOUNTER SYMPTOMS
GASTROINTESTINAL NEGATIVE: 1
SHORTNESS OF BREATH: 0
EYES NEGATIVE: 1
ALLERGIC/IMMUNOLOGIC NEGATIVE: 1

## 2022-10-24 NOTE — PROGRESS NOTES
[facility] CARDIOLOGY  [address] [place] [address], [address] [facility], [address] [place] [address]  PHONE: [phone]        10/24/22      NAME:  Alisa Portillo  : [id number]  MRN: [id number]     Referring Cardiologist: Macdonald Lesches, MD    Reason for Consultation: Atrial fibrillation     ASSESSMENT and PLAN:  Nuvia Gore was seen today for atrial fibrillation. Diagnoses and all orders for this visit:    Paroxysmal atrial fibrillation (HCC)    Atrial flutter, typical     Coronary artery calcification of native artery    Mild mitral regurgitation by prior echocardiogram    Esophageal spasm    Obesity (BMI 30-39.9)    67year old female with mildly persistent atrial fibrillation here for an EP evaluation. She appears to have AF but there are no easy to find records/tracings of her being in atrial fibrillation. I would like to verify this diagnosis prior to offering more options for her atrial fibrillation mgmt including a potential AF ablation. We did discuss even if a 2 week monitor does not capture AF, even an Apple watch strip can be printed out and should be acceptable. For now, will continue current therapies and decide on mgmt in the near follow up interval.     She has been found to have AF with RVR and also AFL at times. She is highly symptomatic and has decided on AF/AFL ablation. -AF - continue current therapies. Plan for AF/AFL ablation.   -Routine cardiac care per Dr. Rahat Cosme. -EP follow up in 3 months or PRN. Procedure to be performed: AF/AFL ablation  Device/ablation Company: [employer]  Procedure Date: TBD  Medications to hold, days to hold ([address] [place] [address], AAD): Eliquis, 1 dose. Anesthesia: GA   GARY required?: Y    AF ABLATION EDUCATION (done today):  I discussed with the patient the pathophysiology of atrial fibrillation, including details about potential triggers from the pulmonary veins (PVs), as well as non-PV sites.   We also discussed that in certain patients (especially those with more persistent AF) there is often atrial substrate (i.e. fibrosis, dilatation, etc.) that promotes more sustained AF. We also discussed the therapeutic options for treatment of atrial fibrillation, including:  --Rate control   --Rhythm control with antiarrhythmic drugs (AAD) and supplemental rate control  --Catheter ablation, including pulmonary vein isolation (PVI), with or without additional substrate modification, in attempt to maintain sinus rhythm long-term  --AV romario ablation with a permanent pacemaker (ablate & pace). I emphasized to the patient that all of these options require stroke prophylaxis with oral anticoagulation therapy ([address] [place] [address]) with  Coumadin or novel oral anticoagulant (NOACs), depending on risk factors. I explained that successful catheter ablation with sufficient long-term follow-up documenting a lack of recurrent AF may allow for discontinuation of anticoagulation in the future; however, this has not been proven safe in prospective clinical studies and is still considered experimental.  Data from retrospective trials, however, has suggested that stopping oral anticoagulation after successful ablation does not result in increased in stroke rates and appears to be safe. The benefits and risks of each of these approaches were outlined in detail. We discussed the variable success rates of AF ablation, which can range from 50-80+%, depending on multiple factors, including paroxysmal vs. persistent AF, duration of AF, AF burden, left atrial size and remodeling, concomitant valvular or other structural heart disease, non-PV triggers, prior ablation lesion sets, obstructive sleep apnea, and other potential confounding factors. I also explained that often (approximately 30-50% of the time) patients will require multiple procedures to achieve long-term AF suppression.   Additionally, we discussed that ablation may decrease AF burden and/or symptoms, but additional therapeutic strategies (i.e. concomitant AAD therapy, rate controlling medications, oral anticoagulants, etc.) may be needed long term for AF management. The catheter ablation procedure was described to the patient in detail, including the risks of recurrent AF/AT, need for repeat ablation, esophageal perforation/fistula, pulmonary vein stenosis,  bronchial injury/fistula, stroke, cardiac perforation with the need for catheter drainage or surgical repair, vascular damage, DVT/PE, bleeding, thermal skin burns, radiation skin injury, kidney failure, pneumo/hemothorax, need for permanent pacemaker, phrenic or vagus nerve damage resulting in diaphragmatic paralysis or gastroparesis, stiff left atrial syndrome, and even death. We also discussed in detail today the options for anticoagulation for AF , including the periprocedure period. Published data (the NOAM trial, [place] 2011) suggests that Gwenith Porteous is non-inferior to warfarin for stroke prevention in AF, and appears to be safer with reductions in bleeding and overall mortality. Also, using Apixaban will allow us to avoid routine lab monitoring as well as many drug-drug and food-drug interactions. Additionally, using Apixaban will allow us to avoid using LMWH periprocedurally, which may reduce bleeding and hematomas. The patient is aware of these risks/benefits and wishes to proceed with using apixiban periprocedurally. TOTAL TIME: 25 minutes, >50% during counseling and coordination of care     Patient has been instructed and agrees to call our office with any issues or other concerns related to their cardiac condition(s) and/or complaint(s). Thank you for allowing me to participate in the electrophysiologic care of Ms. Jennifer Gonzalez. Please contact me if any questions or concerns were to arise. Beata Barrios.  [place] NUR, MS  Clinical Cardiac Electrophysiology  [facility]  10/24/22  11:39 AM    ===================================================================  Chief Complant:    Chief Complaint   Patient presents with    Irregular Heart Beat      Consultation is requested by No ref. provider found for evaluation of Irregular Heart Beat    History:  Grecia Stapleton is a most pleasant 67 y.o. female with a past medical and cardiac history significant for pAF and mild CAD. She is referred by Dr. William Alcantara. She's been known to have atrial fibrillation for 15-20 years. Her last episode of AF was about 2 weeks ago. She was OOR for about 24 hours during that time. She does report having about 8-10 episodes of potential atrial fibrillation since March 2022. When she is in \"AF\" she feels short of breath, lightheaded and fatigued. She did wear an Apple watch the last time she was in AF and it demonstrated atrial fibrillation. She is in NSR today. She comes in for follow up. She did have AF on her monitor of up to 6-7 hours with very rapid AF with RVR. She is highly symptomatic when this happens. She was in the hospital recently for AF, saw ED physician. She was given cardizem, and she went back into NSR. She was in AFL at that time even though ECG was read as sinus rhythm. The patient otherwise denies chest pain, dyspnea, presyncope, syncope or lateralizing symptoms. Mother with atrial fibrillation      Cardiac PMH: (Old records have been reviewed and summarized below)    EKG:  (EKG has been independently visualized by me with interpretation below): Sinus bradycardia, nonspecific IVCD, normal axis. Monitor: 12/2021, pSVT, brief. Sinus bradycardia, no AF. Monitor: 7/2022, AF with RVR, 2% burden, episodes of up to 6-7 hours. ECHO: 12/2020    Previous Heart Catheterization: n/a     Stress Test: 8/2018  Left Ventricular Size: normal.  Transient Ischemic Dilatation: not present. Gated cine images demonstrates reveals normal myocardial thickening and   wall motion. Ejection Fraction: 63%     CONCLUSION:   1. Stress EKG: Abnormal due to ST segment changes as described above.    2. SPECT Perfusion Imaging: Normal Perfusion. 3. LV Systolic Function is  normal.   4. Risk Assessment:  Low Risk Scan. DEVICE INTERROGATION: n/a     Past Medical History, Past Surgical History, Family history, Social History, and Medications were all reviewed with the patient today and updated as necessary. Current Outpatient Medications   Medication Sig Dispense Refill    cetirizine (ZYRTEC) 10 MG tablet Take 1 tablet by mouth      furosemide (LASIX) 20 MG tablet 20 mg daily      apixaban (ELIQUIS) 5 MG TABS tablet Take 1 tablet by mouth 2 times daily 180 tablet 3    metoprolol succinate (TOPROL XL) 25 MG extended release tablet Take 1 tablet by mouth daily 90 tablet 3    propafenone (RYTHMOL) 150 MG tablet Take 1 tablet by mouth 3 times daily 270 tablet 3    rosuvastatin (CRESTOR) 10 MG tablet Take 1 tablet by mouth daily 90 tablet 3    benzonatate (TESSALON) 100 MG capsule       vitamin D 25 MCG (1000 UT) CAPS Take by mouth daily      famotidine (PEPCID) 20 MG tablet Take 20 mg by mouth daily      omeprazole (PRILOSEC) 40 MG delayed release capsule Take 40 mg by mouth 2 times daily      sertraline (ZOLOFT) 50 MG tablet Take 50 mg by mouth       No current facility-administered medications for this visit. Allergies   Allergen Reactions    Codeine Nausea Only     \"horrible stomach pain\"  Other reaction(s): nausea and vomiting, Nausea and/or vomiting-Intolerance  \"horrible stomach pain\"      Other      Other reaction(s): Unknown    Oxycodone Hcl      Other reaction(s): nausea and vomiting    Hydrocodone-Acetaminophen Nausea And Vomiting    Oxycodone Nausea And Vomiting       Past Medical History:   Diagnosis Date    Acute cervical radiculopathy     Arthralgia of left shoulder region     Arthritis     fingers    Cervicalgia     Chest pain     pt denies CP or SOB    Chronic pain     right shoulder, sciatica right hip    Claustrophobia 2/21/2017    Coronary disease     Followed by [facility].     COVID-19 vaccine series completed     Pfizer vaccine completed X3 doses    Current use of long term anticoagulation     Eliquis and Aspirin    Depression     controlled with sertraline daily    Displacement of cervical intervertebral disc without myelopathy     Exogenous obesity     Extremity pain     Fibrocystic breast disease     Former cigarette smoker     GERD (gastroesophageal reflux disease)     controlled with omeprazole daily    H/O partial adrenalectomy ([address]     right adrenalectomy (benign tumor)    History of bone density study 2017    Dexa 2017:  Wnl.  10 year fracture risk (FRAX score): Any Fracture:  7.9%  Hip fracture:  0.7%    History of colonoscopy 2017    Colonoscopy current (2017 Dr. Jade Berman --> diverticulitis and tortuous colon, R 10 y). 2018 f/u ([place] noncardiac CP, likely esophageal spasm). Pt cxd EGD. 11/2019 [facility], Ron[facility] Jose, APRN.  -->resched EGD w/ Dr. Jade Berman (see [facility] media\" 2/7/2020 \"Referral order/[facility]\" office note)      History of Helicobacter pylori infection 9/2014    treated with 2 different antibiotics and PPI    HNP (herniated nucleus pulposus), lumbar     Lumbar radiculopathy     Morbid obesity ([address]     BMI 40.7    Nausea & vomiting     pt does well with antiemetic    Neck sprain     & STRAIN    Overactive bladder     Paroxysmal atrial fibrillation ([address]     Spinal stenosis, cervical region     Urge incontinence      Past Surgical History:   Procedure Laterality Date    BREAST LUMPECTOMY Left 12/12/2014    LEFT BREAST DUCTAL EXCISION @ 12 O'CLOCK  [facility], Left breast, 11:30 position, 5 cm  from nipple, core biopsy: Fibrocystic mastopathy having minimal intraductal hyperplasia and apocrine metaplasia.      BREAST SURGERY Left 1999    breast abscess    CHOLECYSTECTOMY  1980    COLONOSCOPY      HYSTERECTOMY (CERVIX STATUS UNKNOWN)  1995    per pt retained her ovaries    ORTHOPEDIC SURGERY  2007    torn tendon right elbow    OTHER SURGICAL HISTORY      acdf OTHER SURGICAL HISTORY      right adrenalectomy (benign tumor)    UPPER GASTROINTESTINAL ENDOSCOPY      US GUIDED CORE BREAST BIOPSY       Family History   Problem Relation Age of Onset    Heart Disease Brother     Lung Disease Mother         copd    Arrhythmia Mother         A. Fib    Heart Disease Brother      Social History     Tobacco Use    Smoking status: Former     Packs/day: 0.25     Types: Cigarettes     Quit date: 1990     Years since quittin.9    Smokeless tobacco: Never   Substance Use Topics    Alcohol use: Yes       ROS:  A comprehensive review of systems was performed with the pertinent positives and negatives as noted in the HPI in addition to:  Review of Systems   Constitutional: Negative. HENT: Negative. Eyes: Negative. Respiratory:  Negative for shortness of breath. Cardiovascular:  Positive for palpitations. Gastrointestinal: Negative. Endocrine: Negative. Genitourinary: Negative. Musculoskeletal: Negative. Skin: Negative. Allergic/Immunologic: Negative. Neurological: Negative. Hematological: Negative. Psychiatric/Behavioral: Negative. All other systems reviewed and are negative. PHYSICAL EXAM:   /72   Pulse 58   Ht 5' 7\" (1.702 m)   Wt 270 lb (122.5 kg)   BMI 42.29 kg/m²      Wt Readings from Last 3 Encounters:   10/24/22 270 lb (122.5 kg)   10/06/22 250 lb (113.4 kg)   22 269 lb 4.8 oz (122.2 kg)     BP Readings from Last 3 Encounters:   10/24/22 110/72   10/06/22 (!) 98/43   22 120/70     Gen: Well appearing, well developed, no acute distress  Eyes: Pupils equal, round.  Extraocular movements are intact  ENT: Oropharynx clear, no oral lesions, normal dentition  CV: S1S2, regular rate and rhythm, no murmurs, rubs or gallops, normal JVD, no carotid bruits, normal distal pulses, no RAINE  Pulm: Clear to auscultation bilaterally, no accessory muscle uses, no wheezes or rales  GI: Soft, NT, ND, +BS  Neuro: Alert and oriented, nonfocal  Psych: Appropriate affect  Skin: Normal color and skin turgor  MSK: Normal muscle bulk and tone    Medical problems and test results were reviewed with the patient today. No results found for any visits on 10/24/22.

## 2022-11-07 NOTE — TELEPHONE ENCOUNTER
MEDICATION REFILL REQUEST      Name of Medication:  Furosemide  Dose:  20 mg  Frequency:  1 a day  Quantity: 90  Days' supply:  90 with 3 refills      Pharmacy Name/Location:  [phone]

## 2022-11-07 NOTE — TELEPHONE ENCOUNTER
Requested Prescriptions     Pending Prescriptions Disp Refills    furosemide (LASIX) 20 MG tablet 90 tablet 3     Sig: Take 1 tablet by mouth daily

## 2022-11-08 RX ORDER — FUROSEMIDE 20 MG/1
20 TABLET ORAL DAILY
Qty: 90 TABLET | Refills: 3 | Status: SHIPPED | OUTPATIENT
Start: 2022-11-08

## 2022-11-12 ENCOUNTER — TELEPHONE (OUTPATIENT)
Dept: CARDIOLOGY | Age: 72
End: 2022-11-12

## 2022-11-13 NOTE — TELEPHONE ENCOUNTER
2021 -- spoke with Pt via phone. States that she noted she was in Afib this AM around 0830. States HR has been 118-20 all day. Statse she took her AM Rythmol and Toprol. Took another Toprol with her mid-day Rythmol. States about 10min after she called the service -- she converted to SR with HR 62. States she has her 3rd Rythmol to take at bedtime. Instructed to cont meds as directed. Call back with further issues. Pt v/u.        Marylyn Ganser, NP-C

## 2022-11-21 ENCOUNTER — PATIENT MESSAGE (OUTPATIENT)
Dept: CARDIOLOGY CLINIC | Age: 72
End: 2022-11-21

## 2022-11-23 NOTE — TELEPHONE ENCOUNTER
From: Jaylan Alfonso  To: Dr. Chilo Jesus  Sent: 11/21/2022 10:16 AM EST  Subject: Question about ablation    Dr. Sari Herndon said he wanted me to have a sleep study done. Am I supposed to have that done before my scheduled ablation? Thank you.

## 2022-11-28 DIAGNOSIS — R06.83 SNORING: ICD-10-CM

## 2022-11-28 DIAGNOSIS — I48.0 PAROXYSMAL ATRIAL FIBRILLATION (HCC): ICD-10-CM

## 2022-11-28 LAB
ANION GAP SERPL CALC-SCNC: 4 MMOL/L (ref 2–11)
BUN SERPL-MCNC: 16 MG/DL (ref 8–23)
CALCIUM SERPL-MCNC: 9 MG/DL (ref 8.3–10.4)
CHLORIDE SERPL-SCNC: 112 MMOL/L (ref 101–110)
CO2 SERPL-SCNC: 29 MMOL/L (ref 21–32)
CREAT SERPL-MCNC: 0.8 MG/DL (ref 0.6–1)
ERYTHROCYTE [DISTWIDTH] IN BLOOD BY AUTOMATED COUNT: 12.6 % (ref 11.9–14.6)
GLUCOSE SERPL-MCNC: 100 MG/DL (ref 65–100)
HCT VFR BLD AUTO: 43.2 % (ref 35.8–46.3)
HGB BLD-MCNC: 13.7 G/DL (ref 11.7–15.4)
INR PPP: 1.3
MCH RBC QN AUTO: 31.1 PG (ref 26.1–32.9)
MCHC RBC AUTO-ENTMCNC: 31.7 G/DL (ref 31.4–35)
MCV RBC AUTO: 98.2 FL (ref 82–102)
NRBC # BLD: 0 K/UL (ref 0–0.2)
PLATELET # BLD AUTO: 167 K/UL (ref 150–450)
PMV BLD AUTO: 11.8 FL (ref 9.4–12.3)
POTASSIUM SERPL-SCNC: 4.4 MMOL/L (ref 3.5–5.1)
PROTHROMBIN TIME: 16.8 SEC (ref 12.6–14.3)
RBC # BLD AUTO: 4.4 M/UL (ref 4.05–5.2)
SODIUM SERPL-SCNC: 145 MMOL/L (ref 133–143)
WBC # BLD AUTO: 4.5 K/UL (ref 4.3–11.1)

## 2022-11-29 ENCOUNTER — ANESTHESIA EVENT (OUTPATIENT)
Dept: CARDIAC CATH/INVASIVE PROCEDURES | Age: 72
End: 2022-11-29
Payer: MEDICARE

## 2022-11-29 NOTE — FLOWSHEET NOTE
Patient pre-assessment complete for atrial fibrillation ablation with Dr. Phoenix Dhillon scheduled for 22 at 0730 am, arrival time 0600 am. Patient verified using . Patient instructed to bring all home medications in labeled bottles on the day of procedure. NPO status reinforced. Patient to hold eliquis and lasix tomorrow morning; may take all other medications.

## 2022-11-30 ENCOUNTER — APPOINTMENT (OUTPATIENT)
Dept: CARDIAC CATH/INVASIVE PROCEDURES | Age: 72
End: 2022-11-30
Attending: INTERNAL MEDICINE
Payer: MEDICARE

## 2022-11-30 ENCOUNTER — HOSPITAL ENCOUNTER (OUTPATIENT)
Age: 72
Setting detail: OUTPATIENT SURGERY
Discharge: HOME | End: 2022-11-30
Attending: INTERNAL MEDICINE | Admitting: INTERNAL MEDICINE
Payer: MEDICARE

## 2022-11-30 ENCOUNTER — ANESTHESIA (OUTPATIENT)
Dept: CARDIAC CATH/INVASIVE PROCEDURES | Age: 72
End: 2022-11-30
Payer: MEDICARE

## 2022-11-30 VITALS
WEIGHT: 260 LBS | HEART RATE: 65 BPM | SYSTOLIC BLOOD PRESSURE: 115 MMHG | RESPIRATION RATE: 12 BRPM | OXYGEN SATURATION: 91 % | HEIGHT: 67 IN | TEMPERATURE: 98 F | DIASTOLIC BLOOD PRESSURE: 64 MMHG | BODY MASS INDEX: 40.81 KG/M2

## 2022-11-30 DIAGNOSIS — I48.91 A-FIB (HCC): ICD-10-CM

## 2022-11-30 DIAGNOSIS — I48.91 ATRIAL FIBRILLATION (HCC): ICD-10-CM

## 2022-11-30 DIAGNOSIS — I48.19 PERSISTENT ATRIAL FIBRILLATION (HCC): ICD-10-CM

## 2022-11-30 LAB
ACT BLD: 329 SECS (ref 70–128)
ACT BLD: 341 SECS (ref 70–128)
ANION GAP SERPL CALC-SCNC: 4 MMOL/L (ref 2–11)
BUN SERPL-MCNC: 21 MG/DL (ref 8–23)
CALCIUM SERPL-MCNC: 9 MG/DL (ref 8.3–10.4)
CHLORIDE SERPL-SCNC: 112 MMOL/L (ref 101–110)
CO2 SERPL-SCNC: 30 MMOL/L (ref 21–32)
CREAT SERPL-MCNC: 1 MG/DL (ref 0.6–1)
ECHO BSA: 2.36 M2
ECHO BSA: 2.36 M2
EKG ATRIAL RATE: 59 BPM
EKG DIAGNOSIS: NORMAL
EKG P AXIS: 50 DEGREES
EKG P-R INTERVAL: 172 MS
EKG Q-T INTERVAL: 434 MS
EKG QRS DURATION: 98 MS
EKG QTC CALCULATION (BAZETT): 429 MS
EKG R AXIS: 28 DEGREES
EKG T AXIS: 45 DEGREES
EKG VENTRICULAR RATE: 59 BPM
GLUCOSE SERPL-MCNC: 112 MG/DL (ref 65–100)
MAGNESIUM SERPL-MCNC: 2.2 MG/DL (ref 1.8–2.4)
POTASSIUM SERPL-SCNC: 3.9 MMOL/L (ref 3.5–5.1)
SODIUM SERPL-SCNC: 146 MMOL/L (ref 133–143)

## 2022-11-30 PROCEDURE — 83735 ASSAY OF MAGNESIUM: CPT

## 2022-11-30 PROCEDURE — C1732 CATH, EP, DIAG/ABL, 3D/VECT: HCPCS | Performed by: INTERNAL MEDICINE

## 2022-11-30 PROCEDURE — 2580000003 HC RX 258: Performed by: ANESTHESIOLOGY

## 2022-11-30 PROCEDURE — 3700000001 HC ADD 15 MINUTES (ANESTHESIA): Performed by: INTERNAL MEDICINE

## 2022-11-30 PROCEDURE — 6360000002 HC RX W HCPCS: Performed by: ANESTHESIOLOGY

## 2022-11-30 PROCEDURE — C1894 INTRO/SHEATH, NON-LASER: HCPCS | Performed by: INTERNAL MEDICINE

## 2022-11-30 PROCEDURE — 85347 COAGULATION TIME ACTIVATED: CPT

## 2022-11-30 PROCEDURE — 93622 COMP EP EVAL L VENTR PAC&REC: CPT | Performed by: INTERNAL MEDICINE

## 2022-11-30 PROCEDURE — C1730 CATH, EP, 19 OR FEW ELECT: HCPCS | Performed by: INTERNAL MEDICINE

## 2022-11-30 PROCEDURE — 2720000010 HC SURG SUPPLY STERILE: Performed by: INTERNAL MEDICINE

## 2022-11-30 PROCEDURE — 2500000003 HC RX 250 WO HCPCS: Performed by: NURSE ANESTHETIST, CERTIFIED REGISTERED

## 2022-11-30 PROCEDURE — C1759 CATH, INTRA ECHOCARDIOGRAPHY: HCPCS | Performed by: INTERNAL MEDICINE

## 2022-11-30 PROCEDURE — 7100000001 HC PACU RECOVERY - ADDTL 15 MIN: Performed by: INTERNAL MEDICINE

## 2022-11-30 PROCEDURE — 93325 DOPPLER ECHO COLOR FLOW MAPG: CPT

## 2022-11-30 PROCEDURE — 93656 COMPRE EP EVAL ABLTJ ATR FIB: CPT | Performed by: INTERNAL MEDICINE

## 2022-11-30 PROCEDURE — 93005 ELECTROCARDIOGRAM TRACING: CPT | Performed by: INTERNAL MEDICINE

## 2022-11-30 PROCEDURE — 7100000000 HC PACU RECOVERY - FIRST 15 MIN: Performed by: INTERNAL MEDICINE

## 2022-11-30 PROCEDURE — 6360000002 HC RX W HCPCS: Performed by: NURSE ANESTHETIST, CERTIFIED REGISTERED

## 2022-11-30 PROCEDURE — C1893 INTRO/SHEATH, FIXED,NON-PEEL: HCPCS | Performed by: INTERNAL MEDICINE

## 2022-11-30 PROCEDURE — C1760 CLOSURE DEV, VASC: HCPCS | Performed by: INTERNAL MEDICINE

## 2022-11-30 PROCEDURE — 80048 BASIC METABOLIC PNL TOTAL CA: CPT

## 2022-11-30 PROCEDURE — 6360000002 HC RX W HCPCS: Performed by: INTERNAL MEDICINE

## 2022-11-30 PROCEDURE — C1766 INTRO/SHEATH,STRBLE,NON-PEEL: HCPCS | Performed by: INTERNAL MEDICINE

## 2022-11-30 PROCEDURE — 3700000000 HC ANESTHESIA ATTENDED CARE: Performed by: INTERNAL MEDICINE

## 2022-11-30 PROCEDURE — 93655 ICAR CATH ABLTJ DSCRT ARRHYT: CPT | Performed by: INTERNAL MEDICINE

## 2022-11-30 PROCEDURE — 2709999900 HC NON-CHARGEABLE SUPPLY: Performed by: INTERNAL MEDICINE

## 2022-11-30 RX ORDER — ADENOSINE 3 MG/ML
INJECTION, SOLUTION INTRAVENOUS PRN
Status: DISCONTINUED | OUTPATIENT
Start: 2022-11-30 | End: 2022-11-30 | Stop reason: HOSPADM

## 2022-11-30 RX ORDER — PROTAMINE SULFATE 10 MG/ML
INJECTION, SOLUTION INTRAVENOUS PRN
Status: DISCONTINUED | OUTPATIENT
Start: 2022-11-30 | End: 2022-11-30 | Stop reason: SDUPTHER

## 2022-11-30 RX ORDER — COLCHICINE 0.6 MG/1
0.6 TABLET ORAL 2 TIMES DAILY PRN
Qty: 20 TABLET | Refills: 0 | Status: SHIPPED | OUTPATIENT
Start: 2022-11-30 | End: 2022-12-10

## 2022-11-30 RX ORDER — ONDANSETRON 2 MG/ML
INJECTION INTRAMUSCULAR; INTRAVENOUS PRN
Status: DISCONTINUED | OUTPATIENT
Start: 2022-11-30 | End: 2022-11-30 | Stop reason: SDUPTHER

## 2022-11-30 RX ORDER — ONDANSETRON 2 MG/ML
4 INJECTION INTRAMUSCULAR; INTRAVENOUS
Status: COMPLETED | OUTPATIENT
Start: 2022-11-30 | End: 2022-11-30

## 2022-11-30 RX ORDER — HEPARIN SODIUM 200 [USP'U]/100ML
INJECTION, SOLUTION INTRAVENOUS CONTINUOUS PRN
Status: COMPLETED | OUTPATIENT
Start: 2022-11-30 | End: 2022-11-30

## 2022-11-30 RX ORDER — SODIUM CHLORIDE, SODIUM LACTATE, POTASSIUM CHLORIDE, CALCIUM CHLORIDE 600; 310; 30; 20 MG/100ML; MG/100ML; MG/100ML; MG/100ML
INJECTION, SOLUTION INTRAVENOUS CONTINUOUS
Status: DISCONTINUED | OUTPATIENT
Start: 2022-11-30 | End: 2022-11-30 | Stop reason: HOSPADM

## 2022-11-30 RX ORDER — SODIUM CHLORIDE 0.9 % (FLUSH) 0.9 %
5-40 SYRINGE (ML) INJECTION PRN
Status: DISCONTINUED | OUTPATIENT
Start: 2022-11-30 | End: 2022-11-30 | Stop reason: HOSPADM

## 2022-11-30 RX ORDER — EPHEDRINE SULFATE/0.9% NACL/PF 50 MG/5 ML
SYRINGE (ML) INTRAVENOUS PRN
Status: DISCONTINUED | OUTPATIENT
Start: 2022-11-30 | End: 2022-11-30 | Stop reason: SDUPTHER

## 2022-11-30 RX ORDER — APREPITANT 40 MG/1
40 CAPSULE ORAL ONCE
Status: COMPLETED | OUTPATIENT
Start: 2022-11-30 | End: 2022-11-30

## 2022-11-30 RX ORDER — HEPARIN SODIUM 10000 [USP'U]/100ML
INJECTION, SOLUTION INTRAVENOUS CONTINUOUS PRN
Status: DISCONTINUED | OUTPATIENT
Start: 2022-11-30 | End: 2022-11-30 | Stop reason: SDUPTHER

## 2022-11-30 RX ORDER — SODIUM CHLORIDE 9 MG/ML
INJECTION, SOLUTION INTRAVENOUS PRN
Status: DISCONTINUED | OUTPATIENT
Start: 2022-11-30 | End: 2022-11-30 | Stop reason: HOSPADM

## 2022-11-30 RX ORDER — PROPOFOL 10 MG/ML
INJECTION, EMULSION INTRAVENOUS PRN
Status: DISCONTINUED | OUTPATIENT
Start: 2022-11-30 | End: 2022-11-30 | Stop reason: SDUPTHER

## 2022-11-30 RX ORDER — SODIUM CHLORIDE 0.9 % (FLUSH) 0.9 %
5-40 SYRINGE (ML) INJECTION EVERY 12 HOURS SCHEDULED
Status: DISCONTINUED | OUTPATIENT
Start: 2022-11-30 | End: 2022-11-30 | Stop reason: HOSPADM

## 2022-11-30 RX ORDER — ROCURONIUM BROMIDE 10 MG/ML
INJECTION, SOLUTION INTRAVENOUS PRN
Status: DISCONTINUED | OUTPATIENT
Start: 2022-11-30 | End: 2022-11-30 | Stop reason: SDUPTHER

## 2022-11-30 RX ORDER — PHENYLEPHRINE HYDROCHLORIDE 10 MG/ML
INJECTION INTRAVENOUS PRN
Status: DISCONTINUED | OUTPATIENT
Start: 2022-11-30 | End: 2022-11-30 | Stop reason: SDUPTHER

## 2022-11-30 RX ORDER — PANTOPRAZOLE SODIUM 40 MG/1
40 TABLET, DELAYED RELEASE ORAL 2 TIMES DAILY
Qty: 60 TABLET | Refills: 0 | Status: SHIPPED | OUTPATIENT
Start: 2022-11-30

## 2022-11-30 RX ORDER — DEXAMETHASONE SODIUM PHOSPHATE 4 MG/ML
INJECTION, SOLUTION INTRA-ARTICULAR; INTRALESIONAL; INTRAMUSCULAR; INTRAVENOUS; SOFT TISSUE PRN
Status: DISCONTINUED | OUTPATIENT
Start: 2022-11-30 | End: 2022-11-30 | Stop reason: SDUPTHER

## 2022-11-30 RX ORDER — ONDANSETRON 2 MG/ML
INJECTION INTRAMUSCULAR; INTRAVENOUS
Status: DISCONTINUED
Start: 2022-11-30 | End: 2022-11-30 | Stop reason: HOSPADM

## 2022-11-30 RX ORDER — HYDROMORPHONE HYDROCHLORIDE 2 MG/ML
0.25 INJECTION, SOLUTION INTRAMUSCULAR; INTRAVENOUS; SUBCUTANEOUS EVERY 5 MIN PRN
Status: DISCONTINUED | OUTPATIENT
Start: 2022-11-30 | End: 2022-11-30 | Stop reason: HOSPADM

## 2022-11-30 RX ORDER — HEPARIN SODIUM 1000 [USP'U]/ML
INJECTION, SOLUTION INTRAVENOUS; SUBCUTANEOUS PRN
Status: DISCONTINUED | OUTPATIENT
Start: 2022-11-30 | End: 2022-11-30 | Stop reason: SDUPTHER

## 2022-11-30 RX ORDER — FENTANYL CITRATE 50 UG/ML
INJECTION, SOLUTION INTRAMUSCULAR; INTRAVENOUS PRN
Status: DISCONTINUED | OUTPATIENT
Start: 2022-11-30 | End: 2022-11-30 | Stop reason: SDUPTHER

## 2022-11-30 RX ORDER — DEXTROSE MONOHYDRATE 100 MG/ML
INJECTION, SOLUTION INTRAVENOUS CONTINUOUS PRN
Status: DISCONTINUED | OUTPATIENT
Start: 2022-11-30 | End: 2022-11-30 | Stop reason: HOSPADM

## 2022-11-30 RX ORDER — SUCRALFATE 1 G/1
1 TABLET ORAL 4 TIMES DAILY
Qty: 120 TABLET | Refills: 0 | Status: SHIPPED | OUTPATIENT
Start: 2022-11-30 | End: 2022-12-30

## 2022-11-30 RX ORDER — HALOPERIDOL 5 MG/ML
1 INJECTION INTRAMUSCULAR
Status: DISCONTINUED | OUTPATIENT
Start: 2022-11-30 | End: 2022-11-30 | Stop reason: HOSPADM

## 2022-11-30 RX ORDER — LIDOCAINE HYDROCHLORIDE 20 MG/ML
INJECTION, SOLUTION EPIDURAL; INFILTRATION; INTRACAUDAL; PERINEURAL PRN
Status: DISCONTINUED | OUTPATIENT
Start: 2022-11-30 | End: 2022-11-30 | Stop reason: SDUPTHER

## 2022-11-30 RX ORDER — KETOROLAC TROMETHAMINE 30 MG/ML
INJECTION, SOLUTION INTRAMUSCULAR; INTRAVENOUS PRN
Status: DISCONTINUED | OUTPATIENT
Start: 2022-11-30 | End: 2022-11-30 | Stop reason: SDUPTHER

## 2022-11-30 RX ORDER — LIDOCAINE HYDROCHLORIDE 10 MG/ML
1 INJECTION, SOLUTION INFILTRATION; PERINEURAL
Status: DISCONTINUED | OUTPATIENT
Start: 2022-11-30 | End: 2022-11-30 | Stop reason: HOSPADM

## 2022-11-30 RX ADMIN — PROTAMINE SULFATE 10 MG: 10 INJECTION, SOLUTION INTRAVENOUS at 10:28

## 2022-11-30 RX ADMIN — DEXAMETHASONE SODIUM PHOSPHATE 4 MG: 4 INJECTION, SOLUTION INTRAMUSCULAR; INTRAVENOUS at 08:03

## 2022-11-30 RX ADMIN — ONDANSETRON 4 MG: 2 INJECTION INTRAMUSCULAR; INTRAVENOUS at 10:16

## 2022-11-30 RX ADMIN — PROTAMINE SULFATE 10 MG: 10 INJECTION, SOLUTION INTRAVENOUS at 10:24

## 2022-11-30 RX ADMIN — HEPARIN SODIUM 10000 UNITS: 1000 INJECTION, SOLUTION INTRAVENOUS; SUBCUTANEOUS at 08:39

## 2022-11-30 RX ADMIN — Medication 10 MG: at 08:13

## 2022-11-30 RX ADMIN — FENTANYL CITRATE 50 MCG: 50 INJECTION, SOLUTION INTRAMUSCULAR; INTRAVENOUS at 07:54

## 2022-11-30 RX ADMIN — PROTAMINE SULFATE 10 MG: 10 INJECTION, SOLUTION INTRAVENOUS at 10:22

## 2022-11-30 RX ADMIN — KETOROLAC TROMETHAMINE 30 MG: 30 INJECTION, SOLUTION INTRAMUSCULAR; INTRAVENOUS at 10:21

## 2022-11-30 RX ADMIN — ROCURONIUM BROMIDE 30 MG: 50 INJECTION, SOLUTION INTRAVENOUS at 08:54

## 2022-11-30 RX ADMIN — PROTAMINE SULFATE 10 MG: 10 INJECTION, SOLUTION INTRAVENOUS at 10:25

## 2022-11-30 RX ADMIN — PROTAMINE SULFATE 10 MG: 10 INJECTION, SOLUTION INTRAVENOUS at 10:21

## 2022-11-30 RX ADMIN — LIDOCAINE HYDROCHLORIDE 80 MG: 20 INJECTION, SOLUTION EPIDURAL; INFILTRATION; INTRACAUDAL; PERINEURAL at 07:54

## 2022-11-30 RX ADMIN — Medication 10 MG: at 08:24

## 2022-11-30 RX ADMIN — ONDANSETRON 4 MG: 2 INJECTION INTRAMUSCULAR; INTRAVENOUS at 08:59

## 2022-11-30 RX ADMIN — SODIUM CHLORIDE, SODIUM LACTATE, POTASSIUM CHLORIDE, AND CALCIUM CHLORIDE: 600; 310; 30; 20 INJECTION, SOLUTION INTRAVENOUS at 07:54

## 2022-11-30 RX ADMIN — FENTANYL CITRATE 50 MCG: 50 INJECTION, SOLUTION INTRAMUSCULAR; INTRAVENOUS at 08:03

## 2022-11-30 RX ADMIN — PHENYLEPHRINE HYDROCHLORIDE 100 MCG: 10 INJECTION INTRAVENOUS at 09:15

## 2022-11-30 RX ADMIN — PROPOFOL 50 MG: 10 INJECTION, EMULSION INTRAVENOUS at 10:16

## 2022-11-30 RX ADMIN — APREPITANT 40 MG: 40 CAPSULE ORAL at 07:21

## 2022-11-30 RX ADMIN — PHENYLEPHRINE HYDROCHLORIDE 100 MCG: 10 INJECTION INTRAVENOUS at 08:15

## 2022-11-30 RX ADMIN — PROTAMINE SULFATE 10 MG: 10 INJECTION, SOLUTION INTRAVENOUS at 10:23

## 2022-11-30 RX ADMIN — PHENYLEPHRINE HYDROCHLORIDE 100 MCG: 10 INJECTION INTRAVENOUS at 10:17

## 2022-11-30 RX ADMIN — PHENYLEPHRINE HYDROCHLORIDE 100 MCG: 10 INJECTION INTRAVENOUS at 08:58

## 2022-11-30 RX ADMIN — PROTAMINE SULFATE 10 MG: 10 INJECTION, SOLUTION INTRAVENOUS at 10:20

## 2022-11-30 RX ADMIN — ROCURONIUM BROMIDE 50 MG: 50 INJECTION, SOLUTION INTRAVENOUS at 07:54

## 2022-11-30 RX ADMIN — HEPARIN SODIUM 10000 UNITS: 1000 INJECTION, SOLUTION INTRAVENOUS; SUBCUTANEOUS at 08:21

## 2022-11-30 RX ADMIN — PROTAMINE SULFATE 10 MG: 10 INJECTION, SOLUTION INTRAVENOUS at 10:26

## 2022-11-30 RX ADMIN — PHENYLEPHRINE HYDROCHLORIDE 100 MCG: 10 INJECTION INTRAVENOUS at 09:41

## 2022-11-30 RX ADMIN — SUGAMMADEX 200 MG: 100 INJECTION, SOLUTION INTRAVENOUS at 10:31

## 2022-11-30 RX ADMIN — HEPARIN SODIUM AND DEXTROSE 40 UNITS/KG/HR: 10000; 5 INJECTION INTRAVENOUS at 08:39

## 2022-11-30 RX ADMIN — PROTAMINE SULFATE 10 MG: 10 INJECTION, SOLUTION INTRAVENOUS at 10:27

## 2022-11-30 RX ADMIN — ONDANSETRON 4 MG: 2 INJECTION INTRAMUSCULAR; INTRAVENOUS at 15:43

## 2022-11-30 RX ADMIN — PROTAMINE SULFATE 10 MG: 10 INJECTION, SOLUTION INTRAVENOUS at 10:29

## 2022-11-30 RX ADMIN — PROPOFOL 150 MG: 10 INJECTION, EMULSION INTRAVENOUS at 07:54

## 2022-11-30 NOTE — PERIOP NOTE
TRANSFER - OUT REPORT:    Verbal report given to receiving RN on [facility] Line  being transferred to [facility] for routine post-op       Report consisted of patients Situation, Background, Assessment and   Recommendations(SBAR). Information from the following report(s) Nurse Handoff Report, Surgery Report, Cardiac Rhythm NSR, and Neuro Assessment was reviewed with the receiving nurse. Lines:   Peripheral IV 11/30/22 Left;Proximal Forearm (Active)   Site Assessment Clean, dry & intact 11/30/22 1121   Line Status Infusing 11/30/22 1121   Phlebitis Assessment No symptoms 11/30/22 1121   Infiltration Assessment 0 11/30/22 1121   Dressing Status Clean, dry & intact 11/30/22 1121   Dressing Type Transparent 11/30/22 1121       Peripheral IV 11/30/22 Distal;Right Forearm (Active)   Site Assessment Clean, dry & intact 11/30/22 1121   Line Status Normal saline locked 11/30/22 1121   Phlebitis Assessment No symptoms 11/30/22 1121   Infiltration Assessment 0 11/30/22 1121   Dressing Status Clean, dry & intact 11/30/22 1121   Dressing Type Transparent 11/30/22 1121        Opportunity for questions and clarification was provided. Patient transported with:   Monitor  Registered Nurse    VTE prophylaxis orders have been written for [facility]. Patient and family given floor number and nurses name. Family updated re: pt status after security code verified.

## 2022-11-30 NOTE — PROGRESS NOTES
Report received from Jennie Menjivar, PACU Lab RN. Procedural finding communicated. Intra procedural medication administration reviewed. Progression of care discussed. Patient received into CPRU room 5, Post A fib ablation. Access site without bleeding or swelling. Yes    Patient instructed to limit movement of bilateral lower extremity. Routine post procedural vital signs & site assessment initiated.

## 2022-11-30 NOTE — ANESTHESIA PRE PROCEDURE
Department of Anesthesiology  Preprocedure Note       Name:  Grecia Stapleton   Age:  67 y.o.  :  1950                                          MRN:  [id number]         Date:  2022      Surgeon: Siena Gibbons):  Emely Mackey MD    Procedure: Procedure(s):  Ablation A-fib w complete ep study    Medications prior to admission:   Prior to Admission medications    Medication Sig Start Date End Date Taking?  Authorizing Provider   furosemide (LASIX) 20 MG tablet Take 1 tablet by mouth daily 22   Emely Mackey MD   cetirizine (ZYRTEC) 10 MG tablet Take 1 tablet by mouth    Historical Provider, MD   apixaban (ELIQUIS) 5 MG TABS tablet Take 1 tablet by mouth 2 times daily 10/24/22   Emely Mackey MD   metoprolol succinate (TOPROL XL) 25 MG extended release tablet Take 1 tablet by mouth daily 10/24/22   Emely Mackey MD   propafenone (RYTHMOL) 150 MG tablet Take 1 tablet by mouth 3 times daily 10/24/22   Emely Mackey MD   rosuvastatin (CRESTOR) 10 MG tablet Take 1 tablet by mouth daily 22   Roselia Alicea MD   benzonatate (TESSALON) 100 MG capsule  22   Historical Provider, MD   vitamin D 25 MCG (1000 UT) CAPS Take by mouth daily    Ar Automatic Reconciliation   famotidine (PEPCID) 20 MG tablet Take 20 mg by mouth daily 21   Ar Automatic Reconciliation   omeprazole (PRILOSEC) 40 MG delayed release capsule Take 40 mg by mouth 2 times daily    Ar Automatic Reconciliation   sertraline (ZOLOFT) 50 MG tablet Take 50 mg by mouth 8/10/21   Ar Automatic Reconciliation       Current medications:    Current Facility-Administered Medications   Medication Dose Route Frequency Provider Last Rate Last Admin    lidocaine 1 % injection 1 mL  1 mL IntraDERmal Once PRN Oscar Rosa MD        lactated ringers infusion   IntraVENous Continuous Oscar Rosa MD        sodium chloride flush 0.9 % injection 5-40 mL  5-40 mL IntraVENous 2 times per day Oscar Rosa MD  sodium chloride flush 0.9 % injection 5-40 mL  5-40 mL IntraVENous PRN Candace Benavides MD        0.9 % sodium chloride infusion   IntraVENous PRN Candace Benavides MD        aprepitant (EMEND) capsule 40 mg  40 mg Oral Once Candace Benavides MD        lactated ringers infusion   IntraVENous Continuous Kathie Mei MD           Allergies: Allergies   Allergen Reactions    Codeine Nausea Only     \"horrible stomach pain\"  Other reaction(s): nausea and vomiting, Nausea and/or vomiting-Intolerance  \"horrible stomach pain\"      Other      Other reaction(s): Unknown    Oxycodone Hcl      Other reaction(s): nausea and vomiting    Hydrocodone-Acetaminophen Nausea And Vomiting    Oxycodone Nausea And Vomiting       Problem List:    Patient Active Problem List   Diagnosis Code    Esophageal spasm K22.4    Cough R05.9    Other fatigue R53.83    URI (upper respiratory infection) J06.9    Long term current use of antiarrhythmic drug Z79.899    Chronic pain G89.29    Paroxysmal atrial fibrillation (HCC) I48.0    Claustrophobia F40.240    Chronic low back pain M54.50, G89.29    History of colonoscopy Z98.890    Post-menopause Z78.0    Arthralgia of right wrist M25.531    Coronary artery calcification of native artery I25.10, I25.84    Urinary incontinence R32    Right ear pain H92.01    Mild mitral regurgitation by prior echocardiogram I34.0    Obesity (BMI 30-39. 9) E66.9    Vaginal pain R10.2    OAB (overactive bladder) N32.81    Acute pain of right knee M25.561    Nipple discharge N64.52    Other chest pain R07.89    Fibrocystic breast disease N60.19    Cervical stenosis of spinal canal M48.02    Muscle spasm M62.838    Hyperlipidemia E78.5    Hematuria R31.9    Acute lumbar radiculopathy M54.16    Anticoagulant long-term use Z79.01    Exogenous obesity E66.09    Depression F32. A    GERD (gastroesophageal reflux disease) K21.9    Hyperglycemia R73.9    Morbid obesity with BMI of 40.0-44.9, adult ([facility] [address] E66.01, Z68.41    Pain of left thumb M79.645    Vaginal atrophy N95.2    Right leg pain M79.604    Vitamin D deficiency E55.9    Chronic right shoulder pain M25.511, G89.29    Weight loss R63.4    Skin cancer of arm, right C44.602    Neck pain X55.1    Lichen sclerosus et atrophicus of the vulva N90.4       Past Medical History:        Diagnosis Date    Acute cervical radiculopathy     Arthralgia of left shoulder region     Arthritis     fingers    Cervicalgia     Chest pain     pt denies CP or SOB    Chronic pain     right shoulder, sciatica right hip    Claustrophobia 2/21/2017    Coronary disease     Followed by [facility].  COVID-19 vaccine series completed     Pfizer vaccine completed X3 doses    Current use of long term anticoagulation     Eliquis and Aspirin    Depression     controlled with sertraline daily    Displacement of cervical intervertebral disc without myelopathy     Exogenous obesity     Extremity pain     Fibrocystic breast disease     Former cigarette smoker     GERD (gastroesophageal reflux disease)     controlled with omeprazole daily    H/O partial adrenalectomy ([facility] [address]     right adrenalectomy (benign tumor)    History of bone density study 2017    Dexa 2017:  Wnl.  10 year fracture risk (FRAX score): Any Fracture:  7.9%  Hip fracture:  0.7%    History of colonoscopy 2017    Colonoscopy current (2017 Dr. Dimple Garcia --> diverticulitis and tortuous colon, R 10 y). 2018 f/u ([place] noncardiac CP, likely esophageal spasm).   Pt cxd EGD. 11/2019 [facility]a Monroy, APRN.  -->resched EGD w/ Dr. Dimple Garcia (see [facility] media\" 2/7/2020 \"Referral order/[facility]\" office note)      History of Helicobacter pylori infection 9/2014    treated with 2 different antibiotics and PPI    HNP (herniated nucleus pulposus), lumbar     Lumbar radiculopathy     Morbid obesity ([facility] [address]     BMI 40.7    Nausea & vomiting     pt does well with antiemetic    Neck sprain & STRAIN    Overactive bladder     Paroxysmal atrial fibrillation (HCC)     Spinal stenosis, cervical region     Urge incontinence        Past Surgical History:        Procedure Laterality Date    BREAST LUMPECTOMY Left 2014    LEFT BREAST DUCTAL EXCISION @ 12 O'CLOCK  Mynor Noriega MD at [facility], Left breast, 11:30 position, 5 cm  from nipple, core biopsy: Fibrocystic mastopathy having minimal intraductal hyperplasia and apocrine metaplasia.  BREAST SURGERY Left     breast abscess    CHOLECYSTECTOMY      COLONOSCOPY      HYSTERECTOMY (CERVIX STATUS UNKNOWN)      per pt retained her ovaries    ORTHOPEDIC SURGERY      torn tendon right elbow    OTHER SURGICAL HISTORY      acdf    OTHER SURGICAL HISTORY      right adrenalectomy (benign tumor)    UPPER GASTROINTESTINAL ENDOSCOPY      US GUIDED CORE BREAST BIOPSY         Social History:    Social History     Tobacco Use    Smoking status: Former     Packs/day: 0.25     Types: Cigarettes     Quit date: 1990     Years since quittin.0    Smokeless tobacco: Never   Substance Use Topics    Alcohol use: Yes                                Counseling given: Not Answered      Vital Signs (Current): There were no vitals filed for this visit.                                            BP Readings from Last 3 Encounters:   10/24/22 110/72   10/06/22 (!) 98/43   22 120/70       NPO Status:                                                                                 BMI:   Wt Readings from Last 3 Encounters:   10/24/22 270 lb (122.5 kg)   10/06/22 250 lb (113.4 kg)   22 269 lb 4.8 oz (122.2 kg)     There is no height or weight on file to calculate BMI.    CBC:   Lab Results   Component Value Date/Time    WBC 4.5 2022 11:58 AM    RBC 4.40 2022 11:58 AM    HGB 13.7 2022 11:58 AM    HCT 43.2 2022 11:58 AM    MCV 98.2 2022 11:58 AM    RDW 12.6 2022 11:58 AM     11/28/2022 11:58 AM       CMP:   Lab Results   Component Value Date/Time     11/28/2022 11:58 AM    K 4.4 11/28/2022 11:58 AM     11/28/2022 11:58 AM    CO2 29 11/28/2022 11:58 AM    BUN 16 11/28/2022 11:58 AM    CREATININE 0.80 11/28/2022 11:58 AM    GFRAA >60 12/30/2019 11:23 AM    LABGLOM >60 11/28/2022 11:58 AM    GLUCOSE 100 11/28/2022 11:58 AM    PROT 6.6 10/06/2022 04:22 PM    CALCIUM 9.0 11/28/2022 11:58 AM    BILITOT 0.4 10/06/2022 04:22 PM    ALKPHOS 50 10/06/2022 04:22 PM    ALKPHOS 55 12/30/2021 12:00 AM    AST 12 10/06/2022 04:22 PM    ALT 8 10/06/2022 04:22 PM       POC Tests: No results for input(s): POCGLU, POCNA, POCK, POCCL, POCBUN, POCHEMO, POCHCT in the last 72 hours. Coags:   Lab Results   Component Value Date/Time    PROTIME 16.8 11/28/2022 11:58 AM    INR 1.3 11/28/2022 11:58 AM       HCG (If Applicable): No results found for: PREGTESTUR, PREGSERUM, HCG, HCGQUANT     ABGs: No results found for: PHART, PO2ART, EMO6GMB, RHA4LLK, BEART, A8TUPFPO     Type & Screen (If Applicable):  No results found for: LABABO, LABRH    Drug/Infectious Status (If Applicable):  No results found for: HIV, HEPCAB    COVID-19 Screening (If Applicable): No results found for: COVID19        Anesthesia Evaluation  Patient summary reviewed and Nursing notes reviewed   history of anesthetic complications: PONV. Airway: Mallampati: II  TM distance: >3 FB   Neck ROM: full  Mouth opening: > = 3 FB   Dental: normal exam         Pulmonary:Negative Pulmonary ROS and normal exam                               Cardiovascular:    (+) valvular problems/murmurs: MR, CAD:, dysrhythmias: atrial fibrillation, hyperlipidemia        Rhythm: regular                      Neuro/Psych:   (+) depression/anxiety              ROS comment: Chronic pain    S/p ACDF GI/Hepatic/Renal:   (+) GERD:, morbid obesity         ROS comment: Bladder stimulator, currently turned off.    Endo/Other:    (+) : arthritis: OA., .                  ROS comment: S/p R adrenalectomy, tumor benign Abdominal:             Vascular: Other Findings:           Anesthesia Plan      general     ASA 3     (Last dose Eliquis yesterday)  Induction: intravenous. MIPS: Postoperative opioids intended and Prophylactic antiemetics administered. Anesthetic plan and risks discussed with patient. Use of blood products discussed with patient whom consented to blood products.                      Sukumar Antoine MD   11/30/2022

## 2022-11-30 NOTE — PROGRESS NOTES
Patient's HOB elevated. Bilateral groin sites clean, dry, intact. No bleeding, swelling, hematoma. Provided PO fluids. Family to bedside. Call bell within reach.

## 2022-11-30 NOTE — DISCHARGE INSTRUCTIONS
What to Expect after your Ablation             During the first 48 hours after an ablation, some patients experience:    Mild Chest Discomfort - for a week or so, due to post procedure inflammation. The pain will often worsen with a deep breath or when leaning forward. It should resolve within a week. Mild shortness of breath with activity    Mild to moderate fatigue - this may last 1-3 weeks    Soreness and bruising in the groin area. This bruising may extend down past the knee. This bruising will go away slowly over a few weeks. During the first month after an ablation, some patients may experience:    Palpitations, fast heart rates can be normal first 6 weeks is the healing phase.     Recurrence of the arrhythmia during this time is not an indicator of failure of the ablation. You will generally have two sets of puncture sites one on each side of the groin, keep area clean. You may shower when you get home and remove the band aides. DO NOT go in a tub bath, pool, ocean, or lake until completely healed 7-10 days. Avoid any lotions, powder or creams to the puncture sites. You may notice a lump at the puncture site smaller than the size of a quarter this is normal.     Activity Restrictions:    First two days post ablation, you should take it easy. Do not drive for 24 hours post ablation    Do not lift, pull or push anything greater than 5-10 pounds for 7 days    You may resume normal activity after 1 week, but avoid strenuous activities for 2 weeks      Call us immediately at [phone] for:  Signs of infection, such as fever over 100 degrees F within the first 3 weeks post procedure, drainage from the puncture sites, redness swelling, hot to touch or severe pain.     Lump larger than the size of a quarter near the puncture sites, increasing more painful or seem to be throbbing. Severe chest pain with deep breath or when leaning forward, or shortness of breath    Slurred speech, difficulties swallowing, loss of sensation, decrease strength in hands or legs or any other stroke like symptoms    Severe chest pain or difficulty breathing     1) Continue medicines for now including your blood thinner, Eliquis, indefinitely and your rhythm medicine, propafenone. These medicines should be continued until EP follow up.   2) Will start carafate and protonix for one month. This is to protect your esophagus from a possible injury during the ablation procedure. 3) If chest pain occurs (especially when taking a deep breath), it is likely due to pericarditis or inflammation around your heart sac which is related to the ablation procedure. It usually responded to ibuprofen at 400-600 mg every 6-8 hours as needed. If feels moderate to severe, we can consider a medicinecalled colchicine, please call office for a prescription and discuss symptoms with the triage nurse. 4) Call office with any questions. 5) Relax (no heavy lifting/working) for next 48-72 hours. 6) Reduce activity for 1-2 weeks. Walking ok, driving a car ok after 72 hours. Would avoid being outside in the hottest part of the day. Moderate to intense exercise should be avoided until further direction by Dr. Conrado Holder. 7) Most patients a mild flu like illness post AF ablation which usually improves over the course of 1-2 weeks. If there are alarm symptoms such as near fainting, fevers, chills or worsening shortness of breath or chest pain this should prompt a call to our office. If an emergency, call 911.   8) Office follow up in 1 month or as needed.

## 2022-11-30 NOTE — H&P
The patient has been examined and the previous clinic note dated, 10/24/2022, has been reviewed and changes have been noted below. 67year old female with persistent AF/AFL who presents for ablation. She has undergone informed consent and will proceed with planned procedure under GA. Mynor Thapa. Phoenix Dhillon MD, [address]  Clinical Cardiac Electrophysiology  [facility]     NAME:  Bebteo Chow  :   MRN: [id number]      Referring Cardiologist: Eden Lancaster MD     Reason for Consultation: Atrial fibrillation      ASSESSMENT and PLAN:  Myra Caba was seen today for atrial fibrillation. Diagnoses and all orders for this visit:     Paroxysmal atrial fibrillation (HCC)     Atrial flutter, typical      Coronary artery calcification of native artery     Mild mitral regurgitation by prior echocardiogram     Esophageal spasm     Obesity (BMI 30-39.9)     67year old female with mildly persistent atrial fibrillation here for an EP evaluation. She appears to have AF but there are no easy to find records/tracings of her being in atrial fibrillation. I would like to verify this diagnosis prior to offering more options for her atrial fibrillation mgmt including a potential AF ablation. We did discuss even if a 2 week monitor does not capture AF, even an Apple watch strip can be printed out and should be acceptable. For now, will continue current therapies and decide on mgmt in the near follow up interval.      She has been found to have AF with RVR and also AFL at times. She is highly symptomatic and has decided on AF/AFL ablation. -AF - continue current therapies. Plan for AF/AFL ablation.   -Routine cardiac care per Dr. Lucho Heaton. -EP follow up in 3 months or PRN. Procedure to be performed: AF/AFL ablation  Device/ablation Company: [email or web address]  Procedure Date: TBD  Medications to hold, days to hold ([address] [place] [address], [facility]): Eliquis, 1 dose.   Anesthesia: GA   ISAURO required?: Y     AF ABLATION EDUCATION (done today):  I discussed with the patient the pathophysiology of atrial fibrillation, including details about potential triggers from the pulmonary veins (PVs), as well as non-PV sites. We also discussed that in certain patients (especially those with more persistent AF) there is often atrial substrate (i.e. fibrosis, dilatation, etc.) that promotes more sustained AF. We also discussed the therapeutic options for treatment of atrial fibrillation, including:  --Rate control   --Rhythm control with antiarrhythmic drugs (AAD) and supplemental rate control  --Catheter ablation, including pulmonary vein isolation (PVI), with or without additional substrate modification, in attempt to maintain sinus rhythm long-term  --AV romario ablation with a permanent pacemaker (ablate & pace). I emphasized to the patient that all of these options require stroke prophylaxis with oral anticoagulation therapy ([address] [place] [address]) with  Coumadin or novel oral anticoagulant (NOACs), depending on risk factors. I explained that successful catheter ablation with sufficient long-term follow-up documenting a lack of recurrent AF may allow for discontinuation of anticoagulation in the future; however, this has not been proven safe in prospective clinical studies and is still considered experimental.  Data from retrospective trials, however, has suggested that stopping oral anticoagulation after successful ablation does not result in increased in stroke rates and appears to be safe. The benefits and risks of each of these approaches were outlined in detail. We discussed the variable success rates of AF ablation, which can range from 50-80+%, depending on multiple factors, including paroxysmal vs. persistent AF, duration of AF, AF burden, left atrial size and remodeling, concomitant valvular or other structural heart disease, non-PV triggers, prior ablation lesion sets, obstructive sleep apnea, and other potential confounding factors.   I also explained that often (approximately 30-50% of the time) patients will require multiple procedures to achieve long-term AF suppression. Additionally, we discussed that ablation may decrease AF burden and/or symptoms, but additional therapeutic strategies (i.e. concomitant AAD therapy, rate controlling medications, oral anticoagulants, etc.) may be needed long term for AF management. The catheter ablation procedure was described to the patient in detail, including the risks of recurrent AF/AT, need for repeat ablation, esophageal perforation/fistula, pulmonary vein stenosis,  bronchial injury/fistula, stroke, cardiac perforation with the need for catheter drainage or surgical repair, vascular damage, DVT/PE, bleeding, thermal skin burns, radiation skin injury, kidney failure, pneumo/hemothorax, need for permanent pacemaker, phrenic or vagus nerve damage resulting in diaphragmatic paralysis or gastroparesis, stiff left atrial syndrome, and even death. We also discussed in detail today the options for anticoagulation for AF , including the periprocedure period. Published data (the NOAM trial, [place] 2011) suggests that Jay Acre is non-inferior to warfarin for stroke prevention in AF, and appears to be safer with reductions in bleeding and overall mortality. Also, using Apixaban will allow us to avoid routine lab monitoring as well as many drug-drug and food-drug interactions. Additionally, using Apixaban will allow us to avoid using LMWH periprocedurally, which may reduce bleeding and hematomas. The patient is aware of these risks/benefits and wishes to proceed with using apixiban periprocedurally. TOTAL TIME: 25 minutes, >50% during counseling and coordination of care      Patient has been instructed and agrees to call our office with any issues or other concerns related to their cardiac condition(s) and/or complaint(s). Thank you for allowing me to participate in the electrophysiologic care of Ms. Grady Colunga Jose Corona. Please contact me if any questions or concerns were to arise. Girish Junior MD, MS  Clinical Cardiac Electrophysiology  [facility]  10/24/22  11:39 AM     ===================================================================  Chief Complant:        Chief Complaint   Patient presents with    Irregular Heart Beat      Consultation is requested by No ref. provider found for evaluation of Irregular Heart Beat     History:  Meenakshi Elkins is a most pleasant 67 y.o. female with a past medical and cardiac history significant for pAF and mild CAD. She is referred by Dr. Millicent Rosenberg. She's been known to have atrial fibrillation for 15-20 years. Her last episode of AF was about 2 weeks ago. She was OOR for about 24 hours during that time. She does report having about 8-10 episodes of potential atrial fibrillation since March 2022. When she is in \"AF\" she feels short of breath, lightheaded and fatigued. She did wear an Apple watch the last time she was in AF and it demonstrated atrial fibrillation. She is in NSR today. She comes in for follow up. She did have AF on her monitor of up to 6-7 hours with very rapid AF with RVR. She is highly symptomatic when this happens. She was in the hospital recently for AF, saw ED physician. She was given cardizem, and she went back into NSR. She was in AFL at that time even though ECG was read as sinus rhythm. The patient otherwise denies chest pain, dyspnea, presyncope, syncope or lateralizing symptoms. Mother with atrial fibrillation      Cardiac PMH: (Old records have been reviewed and summarized below)     EKG:  (EKG has been independently visualized by me with interpretation below): Sinus bradycardia, nonspecific IVCD, normal axis. Monitor: 12/2021, pSVT, brief. Sinus bradycardia, no AF. Monitor: 7/2022, AF with RVR, 2% burden, episodes of up to 6-7 hours.       ECHO: 12/2020  Previous Heart Catheterization: n/a      Stress Test: 8/2018  Left Ventricular Size: normal.  Transient Ischemic Dilatation: not present. Gated cine images demonstrates reveals normal myocardial thickening and   wall motion. Ejection Fraction: 63%     CONCLUSION:   1. Stress EKG: Abnormal due to ST segment changes as described above. 2. SPECT Perfusion Imaging: Normal Perfusion. 3. LV Systolic Function is  normal.   4. Risk Assessment:  Low Risk Scan. DEVICE INTERROGATION: n/a      Past Medical History, Past Surgical History, Family history, Social History, and Medications were all reviewed with the patient today and updated as necessary. Current Outpatient Medications   Medication Sig Dispense Refill    cetirizine (ZYRTEC) 10 MG tablet Take 1 tablet by mouth        furosemide (LASIX) 20 MG tablet 20 mg daily        apixaban (ELIQUIS) 5 MG TABS tablet Take 1 tablet by mouth 2 times daily 180 tablet 3    metoprolol succinate (TOPROL XL) 25 MG extended release tablet Take 1 tablet by mouth daily 90 tablet 3    propafenone (RYTHMOL) 150 MG tablet Take 1 tablet by mouth 3 times daily 270 tablet 3    rosuvastatin (CRESTOR) 10 MG tablet Take 1 tablet by mouth daily 90 tablet 3    benzonatate (TESSALON) 100 MG capsule          vitamin D 25 MCG (1000 UT) CAPS Take by mouth daily        famotidine (PEPCID) 20 MG tablet Take 20 mg by mouth daily        omeprazole (PRILOSEC) 40 MG delayed release capsule Take 40 mg by mouth 2 times daily        sertraline (ZOLOFT) 50 MG tablet Take 50 mg by mouth          No current facility-administered medications for this visit.             Allergies   Allergen Reactions    Codeine Nausea Only       \"horrible stomach pain\"  Other reaction(s): nausea and vomiting, Nausea and/or vomiting-Intolerance  \"horrible stomach pain\"       Other         Other reaction(s): Unknown    Oxycodone Hcl         Other reaction(s): nausea and vomiting    Hydrocodone-Acetaminophen Nausea And Vomiting    Oxycodone Nausea And Vomiting              Past Medical History:   Diagnosis Date    Acute cervical radiculopathy      Arthralgia of left shoulder region      Arthritis       fingers    Cervicalgia      Chest pain       pt denies CP or SOB    Chronic pain       right shoulder, sciatica right hip    Claustrophobia 2/21/2017    Coronary disease       Followed by [facility]. COVID-19 vaccine series completed       Pfizer vaccine completed X3 doses    Current use of long term anticoagulation       Eliquis and Aspirin    Depression       controlled with sertraline daily    Displacement of cervical intervertebral disc without myelopathy      Exogenous obesity      Extremity pain      Fibrocystic breast disease      Former cigarette smoker      GERD (gastroesophageal reflux disease)       controlled with omeprazole daily    H/O partial adrenalectomy ([address]       right adrenalectomy (benign tumor)    History of bone density study 2017     Dexa 2017:  Wnl.  10 year fracture risk (FRAX score): Any Fracture:  7.9%  Hip fracture:  0.7%    History of colonoscopy 2017     Colonoscopy current (2017 Dr. Prashant Callahan --> diverticulitis and tortuous colon, R 10 y). 2018 f/u ([place] noncardiac CP, likely esophageal spasm).   Pt cxd EGD. 11/2019 [facility], Jaylyn Hernandez, APRN.  -->resched EGD w/ Dr. Prashant Callahan (see [facility] media\" 2/7/2020 \"Referral order/[facility]\" office note)      History of Helicobacter pylori infection 9/2014     treated with 2 different antibiotics and PPI    HNP (herniated nucleus pulposus), lumbar      Lumbar radiculopathy      Morbid obesity ([address]       BMI 40.7    Nausea & vomiting       pt does well with antiemetic    Neck sprain       & STRAIN    Overactive bladder      Paroxysmal atrial fibrillation ([address]      Spinal stenosis, cervical region      Urge incontinence              Past Surgical History:   Procedure Laterality Date    BREAST LUMPECTOMY Left 12/12/2014     LEFT BREAST DUCTAL EXCISION @ [address], MD at [facility] MAIN OR, Left breast, 11:30 position, 5 cm  from nipple, core biopsy: Fibrocystic mastopathy having minimal intraductal hyperplasia and apocrine metaplasia. BREAST SURGERY Left 1999     breast abscess    CHOLECYSTECTOMY       COLONOSCOPY        HYSTERECTOMY (CERVIX STATUS UNKNOWN)        per pt retained her ovaries    ORTHOPEDIC SURGERY        torn tendon right elbow    OTHER SURGICAL HISTORY         acdf    OTHER SURGICAL HISTORY        right adrenalectomy (benign tumor)    UPPER GASTROINTESTINAL ENDOSCOPY        US GUIDED CORE BREAST BIOPSY                Family History   Problem Relation Age of Onset    Heart Disease Brother      Lung Disease Mother           copd    Arrhythmia Mother           A. Fib    Heart Disease Brother        Social History            Tobacco Use    Smoking status: Former       Packs/day: 0.25       Types: Cigarettes       Quit date: 1990       Years since quittin.9    Smokeless tobacco: Never   Substance Use Topics    Alcohol use: Yes         ROS:  A comprehensive review of systems was performed with the pertinent positives and negatives as noted in the HPI in addition to:  Review of Systems   Constitutional: Negative. HENT: Negative. Eyes: Negative. Respiratory:  Negative for shortness of breath. Cardiovascular:  Positive for palpitations. Gastrointestinal: Negative. Endocrine: Negative. Genitourinary: Negative. Musculoskeletal: Negative. Skin: Negative. Allergic/Immunologic: Negative. Neurological: Negative. Hematological: Negative. Psychiatric/Behavioral: Negative. All other systems reviewed and are negative.      PHYSICAL EXAM:   /72   Pulse 58   Ht 5' 7\" (1.702 m)   Wt 270 lb (122.5 kg)   BMI 42.29 kg/m²           Wt Readings from Last 3 Encounters:   10/24/22 270 lb (122.5 kg)   10/06/22 250 lb (113.4 kg)   22 269 lb 4.8 oz (122.2 kg)          BP Readings from Last 3 Encounters:   10/24/22 110/72   10/06/22 (!) 98/43 07/11/22 120/70      Gen: Well appearing, well developed, no acute distress  Eyes: Pupils equal, round. Extraocular movements are intact  ENT: Oropharynx clear, no oral lesions, normal dentition  CV: S1S2, regular rate and rhythm, no murmurs, rubs or gallops, normal JVD, no carotid bruits, normal distal pulses, no RAINE  Pulm: Clear to auscultation bilaterally, no accessory muscle uses, no wheezes or rales  GI: Soft, NT, ND, +BS  Neuro: Alert and oriented, nonfocal  Psych: Appropriate affect  Skin: Normal color and skin turgor  MSK: Normal muscle bulk and tone     Medical problems and test results were reviewed with the patient today. No results found for any visits on 10/24/22.

## 2022-11-30 NOTE — Clinical Note
Closed using: manual pressure and Vascade. Site secured by Tegaderm and transparent dressing. Manual pressure held for: 5 minutes.

## 2022-11-30 NOTE — PROCEDURES
: Estela Tierney. Jamal Lares MD    REFERRING: Steven Anderson MD    Pre-Electrophysiology Diagnosis  1. Atrial fibrillation  2. Atrial flutter     Procedure Performed  1. EPS afib ablation/pulmonary vein isolation  2. Left atrial pacing recording from the coronary sinus. 3. 3-D Electroanatomical mapping  4. Intracardiac echo  5. Ablation of second arrhythmia  6. LV pacing and recording  7. Transesophageal echo  8. Drug infusion    Anesthesia: General     Estimated Blood Loss: Less than 50 cc     Specimens: * No specimens in log *    Antibiotics: None    Complications: None    Fluoroscopy Time: 0 minutes     Procedure in Detail:  The patient was brought to the electrophysiology lab in the fasting state. The patient was intubated by anesthesiology and an esophageal temperature probe inserted and advanced to a location directly posterior to the LA at the level of the pulmonary veins. The esophageal temperature probe was repositioned throughout the case to a location as close the the ablation catheter as possible. If the esophageal temperature increased 0.5 degrees Celsius ablation was stopped and high flush performed until the temperature returned to baseline. A tranesophageal echocardiogram was performed directly prior to the procedure and was negative for a LYDIA thrombus (see full report in chart). A Ref-Star CARTO patch was placed, the patient was then prepped and draped in sterile fashion. Venous access was obtained under ultrasound guidance x4 using modified Seldinger technique, with placement of three 8 Fr short sidearm sheaths into the right femoral vein and placement of a 10 Fr sheath into the left femoral vein. Two of the 8 Fr sheaths were upsized to an 8.5 Fr CadiaGuide (Biosense-Hoang) and Vizigo (Biosense-Hoang) sheaths, respectively. The patient presented to the EP lab in normal sinus rhythm.   An intra-cardiac echo probe was prepped, and then inserted into an 10 Fr short sheath and used to localize the fossa ovalis and create LA and pulmonary vein anatomy utilizing CARTO Sound technology. A Biosense Hoang Pentaray catheter was then inserted into an 8.5 CardiaGuide sheath and used to create a 3D electroanatomical map of the right atrium, including attempts at His localization and the os of the CS with a focus on minimizing fluoroscopic radiation. Then a Smart Touch porous irrigated BW 3.5 mm ablation catheter was used to map out the body of the CS. A decapolar Biosense Hoang CS catheter was inserted via an 8Fr sheath and positioned in the mid coronary sinus. Next, a trans-septal needle was inserted into the CardiaGuide sheath and was used to perform a trans-septal puncture with assistance from ICE (intra-cardiac echo) as well as the LA Carto Sound map and the right atrial impedence map. The SL1 sheath was advanced into the LA. Total weight based heparin bolus was administered just after transeptal access, and systemic blood pressure monitored invasively. The patient was then placed on our heparin weight based nomogram for targeted ACT of 300-350 during the ablation procedure. A second trans-septal access was obtained with the ablation catheter using the \"sav-wire\" technique. The sheaths were carefully advanced into the LA. The Pentaray catheter was then inserted into the LA via the CardiaGuide sheath and was used to map pulmonary vein potentials and target ablation. An 8 Fr, 3.5 mm tip saline irrigated bidirectional D/F curve Thermo-cool SmartTouch catheter was used for RF ablation and ablation was performed at 40-45 W unless ablation was in the proximity of the esophagus where ablation was performed at 31-35 W. The esophagus was located nearest the left inferior PV. Antral pulmonary vein isolation was then performed for all 4 pulmonary veins with ablation index targets of 500 and 380 on the anterior and posterior walls, respectively.  Entrance and exit block was demonstrated by left atrial pacing and within the pulmonary veins. Lack of any conducted atrial EGMs into the pulmonary veins was documented. Prior to ablation of the right antral pulmonary veins, the ablation catheter was used to pace at high output to try to localize the right phrenic nerve and map its location prior to ablation. Adenosine was injected (12 mg) to demonstrate the lack of early reconnection with the Pentaray in the PVs. There was no early reconnection with adenosine. The ablation catheter was inserted into the LV, documented LV electrograms and was used to pace the LV for the EP study and for rescue pacing during adenosine infusion. Cavotricuspid Isthmus ablation (right atrial flutter)  Linear ablation across the cavo-tricuspid isthmus was performed starting with 1:2 A:V EGMs along the isthmus at 6pm RAJESH. The local electrogram activation sequence, differential pacing maneuvers and electrogram timing was used to demonstrate bidirectional block along the cavotricuspid isthmus. Post-Ablation trans-isthmus time: 185 msec  Local double potentials: 120 msec    Next, baseline recordings and a diagnostic EP study was performed. The coronary sinus multipolar catheter was used to pace the left atrium during the EP study. The LA CS electrograms were documented and interpreted during the procedure. A comprehensive EP study was performed with 1:1 AV decremental pacing, atrial extrastimuli and ventricular pacing to assess retrograde conduction. The patient did not have sustained slow pathway conduction or evidence of an accessory pathway. Ventricular pacing revealed retrograde AV conduction which was concentric and decremental.    At the completion of the ablation and EPS, all catheters were removed, 100 mg of Protamine was administered and all long sheaths were exchanged for short 8 Fr sheaths. Four VASCADE MVP devices were used to close the venotomy sites.  The MVP tools were advanced into the 8 Fr and 10 Fr sheaths, respectively; the sheaths were then removed. The collagen was then deployed and a 30 second dwell time was performed to allow for expansion of the collagen. The delivery tools were then removed with adequate hemostasis. The patient tolerated the procedure well with no acute complications recognized. The patient left the EP lab in stable condition, in normal sinus rhythm. Just prior to pulling shealths, the ICE catheter was used to obtain ultrasound images and revealed no evidence of pericardial effusion. ABLATION DATA:  Total procedure time: 122 minutes  Total RF time: 21 minutes 50 seconds. LA Volume: 116 cc     Baseline Intervals:  AH interval: 87 msec  HV interval: 41 msec  SD interval: 173 msec  QRS duration: 103 msec  QT interval: 413 msec  RR interval: 1026 msec    EP Study  AV [place]bach: 400 msec  AV romario ERP: < or equal to 600/400 msec  Atrial ERP: 600/400 msec  VA Wen[place]bach: >600 msec    Figure 1. 3D electroanatomic map of the posterior left atrium pre and post AF ablation. Figure 2. Baseline ECG tracings. Plan of care: The patient will be placed in the same-day discharge protocol, 3-4 hour flat time, followed by ambulation as tolerated and will continue anticoagulation as prescribed pre-procedure. Impression:   1. Successful pulmonary vein isolation (PVAI) of the 4 pulmonary veins. 2.  Successful ablation of CTI-dependent atrial flutter. 3.  Comprehensive EP study with normal intra-atrial, AV romario and infrahissian conduction times. Plan:   1. Continue OAC (Eliquis) indefinitely until EP follow up. 2.  Continue AAD (propafenone) for at least 4-6 weeks and stop if no AF. 3.  Family updated with plan. 4.  Routine cardiology follow up with Dr. Amauri Beaulieu. 5.  Patient and family updated about small risk of atrioesophageal fistula and need for emergent ED evaluation if any concerning symptoms arise. Kelsey Samuels.  Vernon NUR, MS  Clinical Cardiac Electrophysiology  11/30/2022  10:36 AM

## 2022-11-30 NOTE — PROGRESS NOTES
Patient states relief of nausea. Pt states she feels okay to be discharged. Patient's family agrees. Assisted OOB & ambulated to BR & on unit. Tolerated activity without difficulty. Bilateral groin sites without bleeding or hematoma.

## 2022-11-30 NOTE — PROGRESS NOTES
Patient received to [address] [place] [address] room # 9  Ambulatory from [facility]. Patient scheduled for AFib today with Dr Dany Hoffman. Procedure reviewed & questions answered, voiced good understanding consent obtained & placed on chart. All medications and medical history reviewed. Will prep patient per orders. Patient & family updated on plan of care. The patient has a fraility score of 4-VULNERABLE, based on ability to ambulate independently. Eliquis last taken 11/29 1930.

## 2022-11-30 NOTE — PROGRESS NOTES
Discharge instructions given per orders, voiced good understanding of post A Fib ablation care, medications & follow up care. Denies any questions. Patient wheeled out to private vehicle via RN.

## 2022-11-30 NOTE — PROGRESS NOTES
Assisted OOB & ambulated to BR & on unit. Tolerated activity without difficulty. Bilateral groin sites without bleeding or hematoma. Patient readjusted in bed. Family to bedside. Call bell within reach. No needs expressed at this time.

## 2022-11-30 NOTE — Clinical Note
Prepped: bilateral groin and bilateral chest. Site was clipped and prepped. Prepped with: ChloraPrep. Patient was draped after wet prep solution dried.

## 2022-11-30 NOTE — ANESTHESIA POSTPROCEDURE EVALUATION
Department of Anesthesiology  Postprocedure Note    Patient: Michael Chambers  MRN: [id number]  YOB: 1950  Date of evaluation: 11/30/2022      Procedure Summary     Date: 11/30/22 Room / Location: [facility] EP/CATH 4 ALL EVENTS / [facility] CARDIAC CATH LAB    Anesthesia Start: 0726 Anesthesia Stop: 7701    Procedure: Ablation A-fib w complete ep study Diagnosis:       Persistent atrial fibrillation ([address]      (Atrial fibrillation ([address] [I48.91])    Providers: Herlinda Mahajan MD Responsible Provider: Sophia Vargas MD    Anesthesia Type: general ASA Status: 3          Anesthesia Type: No value filed. Lorenzo Phase I: Lorenzo Score: 10    Lorenzo Phase II:        Anesthesia Post Evaluation    Patient location during evaluation: PACU  Patient participation: complete - patient participated  Level of consciousness: awake and alert  Airway patency: patent  Nausea: well controlled. Complications: no  Cardiovascular status: acceptable.   Respiratory status: acceptable  Hydration status: stable

## 2022-11-30 NOTE — Clinical Note
Transseptal Cath Performed under hemodynamic and ICE, utilizing a standard needle, [place] 71cm via a guiding sheath. Needle inserted.

## 2022-12-22 RX ORDER — PANTOPRAZOLE SODIUM 40 MG/1
TABLET, DELAYED RELEASE ORAL
Qty: 60 TABLET | Refills: 0 | OUTPATIENT
Start: 2022-12-22

## 2022-12-27 RX ORDER — SUCRALFATE 1 G/1
TABLET ORAL
Qty: 120 TABLET | Refills: 0 | OUTPATIENT
Start: 2022-12-27

## 2022-12-27 NOTE — TELEPHONE ENCOUNTER
Therapy completed    Requested Prescriptions     Refused Prescriptions Disp Refills    sucralfate (CARAFATE) 1 GM tablet [Pharmacy Med Name: SUCRALFATE 1 GM TABLET] 120 tablet 0     Sig: TAKE 1 TABLET BY MOUTH FOUR TIMES A DAY

## 2023-01-05 ENCOUNTER — OFFICE VISIT (OUTPATIENT)
Dept: UROLOGY | Age: 73
End: 2023-01-05
Payer: MEDICARE

## 2023-01-05 ENCOUNTER — TELEPHONE (OUTPATIENT)
Dept: UROLOGY | Age: 73
End: 2023-01-05

## 2023-01-05 DIAGNOSIS — N39.41 URGE INCONTINENCE OF URINE: Primary | ICD-10-CM

## 2023-01-05 LAB
BILIRUBIN, URINE, POC: NORMAL
BLOOD URINE, POC: NORMAL
GLUCOSE URINE, POC: NEGATIVE
KETONES, URINE, POC: NEGATIVE
LEUKOCYTE ESTERASE, URINE, POC: NEGATIVE
NITRITE, URINE, POC: NEGATIVE
PH, URINE, POC: 5.5 (ref 4.6–8)
PROTEIN,URINE, POC: NEGATIVE
SPECIFIC GRAVITY, URINE, POC: 1.03 (ref 1–1.03)
URINALYSIS CLARITY, POC: NORMAL
URINALYSIS COLOR, POC: NORMAL
UROBILINOGEN, POC: NORMAL

## 2023-01-05 PROCEDURE — G8484 FLU IMMUNIZE NO ADMIN: HCPCS | Performed by: NURSE PRACTITIONER

## 2023-01-05 PROCEDURE — 1090F PRES/ABSN URINE INCON ASSESS: CPT | Performed by: NURSE PRACTITIONER

## 2023-01-05 PROCEDURE — G8417 CALC BMI ABV UP PARAM F/U: HCPCS | Performed by: NURSE PRACTITIONER

## 2023-01-05 PROCEDURE — 0509F URINE INCON PLAN DOCD: CPT | Performed by: NURSE PRACTITIONER

## 2023-01-05 PROCEDURE — 81003 URINALYSIS AUTO W/O SCOPE: CPT | Performed by: NURSE PRACTITIONER

## 2023-01-05 PROCEDURE — 3017F COLORECTAL CA SCREEN DOC REV: CPT | Performed by: NURSE PRACTITIONER

## 2023-01-05 PROCEDURE — 1123F ACP DISCUSS/DSCN MKR DOCD: CPT | Performed by: NURSE PRACTITIONER

## 2023-01-05 PROCEDURE — 99213 OFFICE O/P EST LOW 20 MIN: CPT | Performed by: NURSE PRACTITIONER

## 2023-01-05 PROCEDURE — G8399 PT W/DXA RESULTS DOCUMENT: HCPCS | Performed by: NURSE PRACTITIONER

## 2023-01-05 PROCEDURE — 1036F TOBACCO NON-USER: CPT | Performed by: NURSE PRACTITIONER

## 2023-01-05 PROCEDURE — G8428 CUR MEDS NOT DOCUMENT: HCPCS | Performed by: NURSE PRACTITIONER

## 2023-01-05 ASSESSMENT — ENCOUNTER SYMPTOMS: NAUSEA: 0

## 2023-01-05 NOTE — PROGRESS NOTES
[facility]  [address] [place] [address], [address]  [id number]  [address]  : 1950    Chief Complaint   Patient presents with    Follow-up          HPI     Joanie Lund is a 67 y.o. female    followed by me for urinary urge incontinence and OAB. She has since had a permanent stimulator placed for her urinary issues. This was placed by Dr. Gloria Santos 2022. She reports she is much better. She is on program 3 at 0.2  She reports at night she continues to have occasional gush of urine. But she is up only once a night. During the day she is voiding at least every 2 weeks. Other significant history of lichen sclerosis. She is seeing someone in [place] for this. She had a biopsy that confirmed this. In the past she has tried Vesicare, Detrol and Myrbetriq. None of those really helped the urgency and blood pressure was an issue with Myrbetriq. She is satisfied with her current state. Past Medical History:   Diagnosis Date    Acute cervical radiculopathy     Arthralgia of left shoulder region     Arthritis     fingers    Cervicalgia     Chest pain     pt denies CP or SOB    Chronic pain     right shoulder, sciatica right hip    Claustrophobia 2017    Coronary disease     Followed by [facility]. COVID-19 vaccine series completed     Pfizer vaccine completed X3 doses    Current use of long term anticoagulation     Eliquis and Aspirin    Depression     controlled with sertraline daily    Displacement of cervical intervertebral disc without myelopathy     Exogenous obesity     Extremity pain     Fibrocystic breast disease     Former cigarette smoker     GERD (gastroesophageal reflux disease)     controlled with omeprazole daily    H/O partial adrenalectomy ([address]     right adrenalectomy (benign tumor)    History of bone density study 2017    Dexa 2017:  Wnl.  10 year fracture risk (FRAX score):   Any Fracture:  7.9%  Hip fracture:  0.7% History of colonoscopy 2017    Colonoscopy current (2017 Dr. Nila Banks --> diverticulitis and tortuous colon, R 10 y). 2018 f/u ([place] noncardiac CP, likely esophageal spasm). Pt cxd EGD. 11/[facility] LILI Ugarte.  -->resched EGD w/ Dr. Nila Banks (see [facility] media\" 2/7/2020 \"Referral order/[facility]\" office note)      History of Helicobacter pylori infection 9/2014    treated with 2 different antibiotics and PPI    HNP (herniated nucleus pulposus), lumbar     Lumbar radiculopathy     Morbid obesity ([address]     BMI 40.7    Nausea & vomiting     pt does well with antiemetic    Neck sprain     & STRAIN    Overactive bladder     Paroxysmal atrial fibrillation ([address]     Spinal stenosis, cervical region     Urge incontinence      Past Surgical History:   Procedure Laterality Date    BREAST LUMPECTOMY Left 12/12/2014    LEFT BREAST DUCTAL EXCISION @ 12 O'CLOCK  Aayush Cabral MD at [facility] MAIN OR, Left breast, 11:30 position, 5 cm  from nipple, core biopsy: Fibrocystic mastopathy having minimal intraductal hyperplasia and apocrine metaplasia.      BREAST SURGERY Left 1999    breast abscess    CHOLECYSTECTOMY  1980    COLONOSCOPY      EP DEVICE PROCEDURE N/A 11/30/2022    Ablation A-fib w complete ep study performed by Asa Rincon MD at [address] [place] [address] LAB    HYSTERECTOMY (CERVIX STATUS UNKNOWN)  1995    per pt retained her ovaries    ORTHOPEDIC SURGERY  2007    torn tendon right elbow    OTHER SURGICAL HISTORY      acdf    OTHER SURGICAL HISTORY  1994    right adrenalectomy (benign tumor)    UPPER GASTROINTESTINAL ENDOSCOPY      US GUIDED CORE BREAST BIOPSY       Current Outpatient Medications   Medication Sig Dispense Refill    sucralfate (CARAFATE) 1 GM tablet Take 1 tablet by mouth 4 times daily 120 tablet 0    colchicine (COLCRYS) 0.6 MG tablet Take 1 tablet by mouth 2 times daily as needed for Pain 20 tablet 0    pantoprazole (PROTONIX) 40 MG tablet Take 1 tablet by mouth in the morning and at bedtime 60 tablet 0    furosemide (LASIX) 20 MG tablet Take 1 tablet by mouth daily 90 tablet 3    cetirizine (ZYRTEC) 10 MG tablet Take 1 tablet by mouth      apixaban (ELIQUIS) 5 MG TABS tablet Take 1 tablet by mouth 2 times daily 180 tablet 3    metoprolol succinate (TOPROL XL) 25 MG extended release tablet Take 1 tablet by mouth daily 90 tablet 3    propafenone (RYTHMOL) 150 MG tablet Take 1 tablet by mouth 3 times daily 270 tablet 3    rosuvastatin (CRESTOR) 10 MG tablet Take 1 tablet by mouth daily 90 tablet 3    benzonatate (TESSALON) 100 MG capsule       vitamin D 25 MCG (1000 UT) CAPS Take by mouth daily      famotidine (PEPCID) 20 MG tablet Take 20 mg by mouth daily      omeprazole (PRILOSEC) 40 MG delayed release capsule Take 40 mg by mouth 2 times daily      sertraline (ZOLOFT) 50 MG tablet Take 50 mg by mouth       No current facility-administered medications for this visit. Allergies   Allergen Reactions    Codeine Nausea Only     \"horrible stomach pain\"  Other reaction(s): nausea and vomiting, Nausea and/or vomiting-Intolerance  \"horrible stomach pain\"      Other      Other reaction(s): Unknown    Oxycodone Hcl      Other reaction(s): nausea and vomiting    Hydrocodone-Acetaminophen Nausea And Vomiting    Oxycodone Nausea And Vomiting     Social History     Socioeconomic History    Marital status:       Spouse name: Not on file    Number of children: Not on file    Years of education: Not on file    Highest education level: Not on file   Occupational History    Not on file   Tobacco Use    Smoking status: Former     Packs/day: 0.25     Types: Cigarettes     Quit date: 1990     Years since quittin.1    Smokeless tobacco: Never   Substance and Sexual Activity    Alcohol use: Yes    Drug use: No    Sexual activity: Not on file     Comment: hysterectomy   Other Topics Concern    Not on file   Social History Narrative    Pt is primary caregiver for  who just suffered another stroke; going home later this week. IM:  Dr. Josesito Celaya  Derm:  Dr. Luis Fernando Hernandez.     10/2018 Pt's  . Social Determinants of Health     Financial Resource Strain: Not on file   Food Insecurity: Not on file   Transportation Needs: Not on file   Physical Activity: Not on file   Stress: Not on file   Social Connections: Not on file   Intimate Partner Violence: Not on file   Housing Stability: Not on file     Family History   Problem Relation Age of Onset    Heart Disease Brother     Lung Disease Mother         copd    Arrhythmia Mother         A. Fib    Heart Disease Brother        Review of Systems  Constitutional:   Negative for fever. GI:  Negative for nausea. Genitourinary: Positive for urinary burning. Urinalysis  UA - Dipstick  Results for orders placed or performed in visit on 23   AMB POC URINALYSIS DIP STICK AUTO W/O MICRO   Result Value Ref Range    Color (UA POC)      Clarity (UA POC)      Glucose, Urine, POC Negative Negative    Bilirubin, Urine, POC Small Negative    KETONES, Urine, POC Negative Negative    Specific Gravity, Urine, POC 1.030 1.001 - 1.035    Blood (UA POC) Trace-intact Negative    pH, Urine, POC 5.5 4.6 - 8.0    Protein, Urine, POC Negative Negative    Urobilinogen, POC 1 mg/dL     Nitrite, Urine, POC Negative Negative    Leukocyte Esterase, Urine, POC Negative Negative     PHYSICAL EXAM    GENERAL: No acute distress, Awake, Alert, Oriented X 3, Gait normal  ABDOMEN: soft, non tender, non-distended, positive bowel sounds, no organomegaly, no palpable masses, no guarding, no rebound tenderness  SKIN: No rash, no erythema, no lacerations or abrasions, no ecchymosis  MUSCULOSKELETAL - MAEW, no edema     Assessment and Plan    ICD-10-CM    1. Urge incontinence of urine  N39.41 AMB POC URINALYSIS DIP STICK AUTO W/O MICRO        PLAN:  Patient is doing well with InterStim  Follow-up in 6 months  Sooner if any symptoms change.   May need to be evaluated by [employer] rep yearly. LILI Brunner - HUEY Alejandra is supervising physician today and he approves plan of care. Return in about 6 months (around 7/5/2023) for for recheck. Elements of this note have been dictated using speech recognition software. Although reviewed, errors of speech recognition may have occurred.

## 2023-01-05 NOTE — TELEPHONE ENCOUNTER
Pt will need to see Dr. Raphael Lorenzo and [employer] rep some tiime in the near future. She is doing well but would like the inter stim setting checked. Can you arrange time.      LILI Gibbons NP

## 2023-01-08 LAB
BACTERIA SPEC CULT: NORMAL
BACTERIA SPEC CULT: NORMAL
SERVICE CMNT-IMP: NORMAL

## 2023-01-30 ENCOUNTER — OFFICE VISIT (OUTPATIENT)
Dept: CARDIOLOGY CLINIC | Age: 73
End: 2023-01-30
Payer: MEDICARE

## 2023-01-30 VITALS
DIASTOLIC BLOOD PRESSURE: 76 MMHG | SYSTOLIC BLOOD PRESSURE: 112 MMHG | BODY MASS INDEX: 42.22 KG/M2 | WEIGHT: 269 LBS | HEIGHT: 67 IN | HEART RATE: 57 BPM

## 2023-01-30 DIAGNOSIS — Z09 HOSPITAL DISCHARGE FOLLOW-UP: ICD-10-CM

## 2023-01-30 DIAGNOSIS — I48.0 PAROXYSMAL ATRIAL FIBRILLATION (HCC): Primary | ICD-10-CM

## 2023-01-30 PROCEDURE — 1090F PRES/ABSN URINE INCON ASSESS: CPT | Performed by: INTERNAL MEDICINE

## 2023-01-30 PROCEDURE — 3017F COLORECTAL CA SCREEN DOC REV: CPT | Performed by: INTERNAL MEDICINE

## 2023-01-30 PROCEDURE — 1036F TOBACCO NON-USER: CPT | Performed by: INTERNAL MEDICINE

## 2023-01-30 PROCEDURE — 99213 OFFICE O/P EST LOW 20 MIN: CPT | Performed by: INTERNAL MEDICINE

## 2023-01-30 PROCEDURE — G8484 FLU IMMUNIZE NO ADMIN: HCPCS | Performed by: INTERNAL MEDICINE

## 2023-01-30 PROCEDURE — 1123F ACP DISCUSS/DSCN MKR DOCD: CPT | Performed by: INTERNAL MEDICINE

## 2023-01-30 PROCEDURE — G8399 PT W/DXA RESULTS DOCUMENT: HCPCS | Performed by: INTERNAL MEDICINE

## 2023-01-30 PROCEDURE — G8427 DOCREV CUR MEDS BY ELIG CLIN: HCPCS | Performed by: INTERNAL MEDICINE

## 2023-01-30 PROCEDURE — G8417 CALC BMI ABV UP PARAM F/U: HCPCS | Performed by: INTERNAL MEDICINE

## 2023-01-30 PROCEDURE — 1111F DSCHRG MED/CURRENT MED MERGE: CPT | Performed by: INTERNAL MEDICINE

## 2023-01-30 PROCEDURE — 93000 ELECTROCARDIOGRAM COMPLETE: CPT | Performed by: INTERNAL MEDICINE

## 2023-01-30 ASSESSMENT — ENCOUNTER SYMPTOMS
GASTROINTESTINAL NEGATIVE: 1
EYES NEGATIVE: 1
ALLERGIC/IMMUNOLOGIC NEGATIVE: 1
SHORTNESS OF BREATH: 0

## 2023-01-30 NOTE — PROGRESS NOTES
[facility] CARDIOLOGY  [address] [place] [address], [address] [facility], [address] [place] [address]  PHONE: [phone]        23      NAME:  René Calhoun  :   MRN: [id number]     Referring Cardiologist: Libia Antonio MD    Reason for Consultation: Atrial fibrillation     ASSESSMENT and PLAN:  Tami Valencia was seen today for atrial fibrillation. Diagnoses and all orders for this visit:    Paroxysmal atrial fibrillation ([address] s/p AF ablation 2022    Atrial flutter, typical     Coronary artery calcification of native artery    Mild mitral regurgitation by prior echocardiogram    Esophageal spasm    Obesity (BMI 30-39.9)    67year old female with mildly persistent atrial fibrillation here for an EP evaluation. She has been found to have AF with RVR and also AFL at times. She is now s/p AF/AFL ablation. -AF - s/p AF ablation. Continues on Eliquis, metoprolol. Look to wean off propafenone as able. Routine post AF ablation care/evaluation.   -Obesity - encourage weight loss.   -Routine cardiac care per Dr. Vilma Saxena. -EP follow up as scheduled. Patient has been instructed and agrees to call our office with any issues or other concerns related to their cardiac condition(s) and/or complaint(s). Thank you for allowing me to participate in the electrophysiologic care of Ms. René Calhoun. Please contact me if any questions or concerns were to arise. Vipul Cuenca. Vernon NUR, MS  Clinical Cardiac Electrophysiology  [facility] Cardiology  23  9:12 AM    ===================================================================  Chief Complant:    Chief Complaint   Patient presents with    Atrial Fibrillation    Follow-Up from Hospital             Consultation is requested by Hilton Herrera MD for evaluation of Atrial Fibrillation and Follow-Up from Hospital (/)    History:  René Calhoun is a most pleasant 67 y.o. female with a past medical and cardiac history significant for pAF and mild CAD. She is referred by Dr. Cydney Lisa. She's been known to have atrial fibrillation for 15-20 years. Her last episode of AF was about 2 weeks ago. She was OOR for about 24 hours during that time. She does report having about 8-10 episodes of potential atrial fibrillation since March 2022. When she is in \"AF\" she feels short of breath, lightheaded and fatigued. She did wear an Apple watch the last time she was in AF and it demonstrated atrial fibrillation. She is in NSR today. She comes in for follow up. She did have AF on her monitor of up to 6-7 hours with very rapid AF with RVR. She is highly symptomatic when this happens. She did undergo an AF ablation in 11/2022. She reports two episodes of her AF since her ablation. She also felt off with flu-like symptoms for 2 weeks after her ablation which is now much better. The patient otherwise denies chest pain, dyspnea, presyncope, syncope or lateralizing symptoms. Mother with atrial fibrillation      Cardiac PMH: (Old records have been reviewed and summarized below)  AF ablation: 11/30/2022       EKG:  (EKG has been independently visualized by me with interpretation below): Sinus bradycardia, nonspecific IVCD, normal axis. Monitor: 12/2021, pSVT, brief. Sinus bradycardia, no AF. Monitor: 7/2022, AF with RVR, 2% burden, episodes of up to 6-7 hours. ECHO: 12/2020    Previous Heart Catheterization: n/a     Stress Test: 8/2018  Left Ventricular Size: normal.  Transient Ischemic Dilatation: not present. Gated cine images demonstrates reveals normal myocardial thickening and   wall motion. Ejection Fraction: 63%     CONCLUSION:   1. Stress EKG: Abnormal due to ST segment changes as described above. 2. SPECT Perfusion Imaging: Normal Perfusion. 3. LV Systolic Function is  normal.   4. Risk Assessment:  Low Risk Scan.     DEVICE INTERROGATION: n/a     Past Medical History, Past Surgical History, Family history, Social History, and Medications were all reviewed with the patient today and updated as necessary. Current Outpatient Medications   Medication Sig Dispense Refill    furosemide (LASIX) 20 MG tablet Take 1 tablet by mouth daily 90 tablet 3    cetirizine (ZYRTEC) 10 MG tablet Take 1 tablet by mouth      apixaban (ELIQUIS) 5 MG TABS tablet Take 1 tablet by mouth 2 times daily 180 tablet 3    metoprolol succinate (TOPROL XL) 25 MG extended release tablet Take 1 tablet by mouth daily 90 tablet 3    propafenone (RYTHMOL) 150 MG tablet Take 1 tablet by mouth 3 times daily 270 tablet 3    rosuvastatin (CRESTOR) 10 MG tablet Take 1 tablet by mouth daily 90 tablet 3    sertraline (ZOLOFT) 50 MG tablet Take 50 mg by mouth      vitamin D 25 MCG (1000 UT) CAPS Take by mouth daily       No current facility-administered medications for this visit. Allergies   Allergen Reactions    Codeine Nausea Only     \"horrible stomach pain\"  Other reaction(s): nausea and vomiting, Nausea and/or vomiting-Intolerance  \"horrible stomach pain\"      Other      Other reaction(s): Unknown    Oxycodone Hcl      Other reaction(s): nausea and vomiting    Hydrocodone-Acetaminophen Nausea And Vomiting    Oxycodone Nausea And Vomiting       Past Medical History:   Diagnosis Date    Acute cervical radiculopathy     Arthralgia of left shoulder region     Arthritis     fingers    Cervicalgia     Chest pain     pt denies CP or SOB    Chronic pain     right shoulder, sciatica right hip    Claustrophobia 2/21/2017    Coronary disease     Followed by [address] [facility].     COVID-19 vaccine series completed     Pfizer vaccine completed X3 doses    Current use of long term anticoagulation     Eliquis and Aspirin    Depression     controlled with sertraline daily    Displacement of cervical intervertebral disc without myelopathy     Exogenous obesity     Extremity pain     Fibrocystic breast disease     Former cigarette smoker     GERD (gastroesophageal reflux disease)     controlled with omeprazole daily    H/O partial adrenalectomy [facility])     right adrenalectomy (benign tumor)    History of bone density study 2017    Dexa 2017:  Wnl.  10 year fracture risk (FRAX score): Any Fracture:  7.9%  Hip fracture:  0.7%    History of colonoscopy 2017    Colonoscopy current (2017 Dr. Linda Etienne --> diverticulitis and tortuous colon, R 10 y). 2018 f/u ([place] noncardiac CP, likely esophageal spasm). Pt cxd EGD. 11/2019 [facility], Smitha Issa, APRN.  -->resched EGD w/ Dr. Linda Etienne (see [facility] media\" 2/7/2020 \"Referral order/[facility]\" office note)      History of Helicobacter pylori infection 9/2014    treated with 2 different antibiotics and PPI    HNP (herniated nucleus pulposus), lumbar     Lumbar radiculopathy     Morbid obesity ([address]     BMI 40.7    Nausea & vomiting     pt does well with antiemetic    Neck sprain     & STRAIN    Overactive bladder     Paroxysmal atrial fibrillation ([address]     Spinal stenosis, cervical region     Urge incontinence      Past Surgical History:   Procedure Laterality Date    BREAST LUMPECTOMY Left 12/12/2014    LEFT BREAST DUCTAL EXCISION @ 12 O'CLOCK  Peter Anthony MD at [facility] MAIN OR, Left breast, 11:30 position, 5 cm  from nipple, core biopsy: Fibrocystic mastopathy having minimal intraductal hyperplasia and apocrine metaplasia. BREAST SURGERY Left 1999    breast abscess    CHOLECYSTECTOMY  1980    COLONOSCOPY      EP DEVICE PROCEDURE N/A 11/30/2022    Ablation A-fib w complete ep study performed by Char Fischer MD at [facility] CARDIAC CATH LAB    HYSTERECTOMY ([address] [facility] [facility] [address])  1995    per pt retained her ovaries    ORTHOPEDIC SURGERY  2007    torn tendon right elbow    OTHER SURGICAL HISTORY      acdf    OTHER SURGICAL HISTORY  1994    right adrenalectomy (benign tumor)    UPPER GASTROINTESTINAL ENDOSCOPY      US GUIDED CORE BREAST BIOPSY       Family History   Problem Relation Age of Onset    Heart Disease Brother     Lung Disease Mother         copd    Arrhythmia Mother         A.  Fib Heart Disease Brother      Social History     Tobacco Use    Smoking status: Former     Packs/day: 0.25     Types: Cigarettes     Quit date: 1990     Years since quittin.1    Smokeless tobacco: Never   Substance Use Topics    Alcohol use: Yes       ROS:  A comprehensive review of systems was performed with the pertinent positives and negatives as noted in the HPI in addition to:  Review of Systems   Constitutional: Negative. HENT: Negative. Eyes: Negative. Respiratory:  Negative for shortness of breath. Cardiovascular:  Positive for palpitations. Gastrointestinal: Negative. Endocrine: Negative. Genitourinary: Negative. Musculoskeletal: Negative. Skin: Negative. Allergic/Immunologic: Negative. Neurological: Negative. Hematological: Negative. Psychiatric/Behavioral: Negative. All other systems reviewed and are negative. PHYSICAL EXAM:   /76   Pulse 57   Ht 5' 7\" (1.702 m)   Wt 269 lb (122 kg)   BMI 42.13 kg/m²      Wt Readings from Last 3 Encounters:   23 269 lb (122 kg)   22 260 lb (117.9 kg)   10/24/22 270 lb (122.5 kg)     BP Readings from Last 3 Encounters:   23 112/76   22 115/64   10/24/22 110/72     Gen: Well appearing, well developed, no acute distress  Eyes: Pupils equal, round. Extraocular movements are intact  ENT: Oropharynx clear, no oral lesions, normal dentition  CV: S1S2, regular rate and rhythm, no murmurs, rubs or gallops, normal JVD, no carotid bruits, normal distal pulses, no RAINE  Pulm: Clear to auscultation bilaterally, no accessory muscle uses, no wheezes or rales  GI: Soft, NT, ND, +BS  Neuro: Alert and oriented, nonfocal  Psych: Appropriate affect  Skin: Normal color and skin turgor  MSK: Normal muscle bulk and tone    Medical problems and test results were reviewed with the patient today. No results found for any visits on 23.

## 2023-02-24 ENCOUNTER — TELEPHONE (OUTPATIENT)
Dept: CARDIOLOGY CLINIC | Age: 73
End: 2023-02-24

## 2023-02-24 NOTE — TELEPHONE ENCOUNTER
Spoke with pt about Echo results per Dr. Maryam Chambers.  Pt verbalized understanding of these findings and had no further questions

## 2023-02-24 NOTE — TELEPHONE ENCOUNTER
----- Message from Omar Bower MD sent at 2/23/2023  3:22 PM EST -----  Stable post AF echo.     ----- Message -----  From: Isael Connell MD  Sent: 2/23/2023   2:45 PM EST  To: Omar Bower MD

## 2023-04-02 NOTE — PROGRESS NOTES
[facility] CARDIOLOGY Follow Up                 Reason for Visit: History of atrial fibrillation    Subjective:     Patient is a 67 y.o. female with a PMH of chronic HFpEF, atrial fibrillation status post ablation, atrial flutter status post ablation, and coronary artery calcification noted on CAT scan who presents for follow-up. The patient was last seen in June 2022. She was referred to EP to consider PVI. Baby aspirin daily was discontinued to mitigate bleeding risk. The patient had a TTE in February 2023 that was noted to demonstrate a normal EF. The patient underwent atrial fibrillation and atrial flutter ablation in November 2022. She last visited Dr. Annamarie Peoples in January 2023 who left her on AAD though she reports that she was given a choice. The patient reports having palpitations twice since November 2022. Both episodes were well-tolerated. She denies angina. The patient does report lower extremity edema that is worse in the evenings. Past Medical History:   Diagnosis Date    Acute cervical radiculopathy     Arthralgia of left shoulder region     Arthritis     fingers    Cervicalgia     Chest pain     pt denies CP or SOB    Chronic pain     right shoulder, sciatica right hip    Claustrophobia 2/21/2017    Coronary disease     Followed by [facility]. COVID-19 vaccine series completed     Pfizer vaccine completed X3 doses    Current use of long term anticoagulation     Eliquis and Aspirin    Depression     controlled with sertraline daily    Displacement of cervical intervertebral disc without myelopathy     Exogenous obesity     Extremity pain     Fibrocystic breast disease     Former cigarette smoker     GERD (gastroesophageal reflux disease)     controlled with omeprazole daily    H/O partial adrenalectomy ([address]     right adrenalectomy (benign tumor)    History of bone density study 2017    Dexa 2017:  Wnl.  10 year fracture risk (FRAX score):   Any Fracture:  7.9%  Hip fracture:  0.7%

## 2023-04-04 ENCOUNTER — OFFICE VISIT (OUTPATIENT)
Dept: CARDIOLOGY CLINIC | Age: 73
End: 2023-04-04
Payer: MEDICARE

## 2023-04-04 VITALS
SYSTOLIC BLOOD PRESSURE: 118 MMHG | BODY MASS INDEX: 42.06 KG/M2 | HEART RATE: 62 BPM | WEIGHT: 268 LBS | HEIGHT: 67 IN | DIASTOLIC BLOOD PRESSURE: 60 MMHG

## 2023-04-04 DIAGNOSIS — I87.2 CHRONIC VENOUS INSUFFICIENCY: ICD-10-CM

## 2023-04-04 DIAGNOSIS — Z86.79 STATUS POST ABLATION OF ATRIAL FLUTTER: ICD-10-CM

## 2023-04-04 DIAGNOSIS — Z86.79 STATUS POST ABLATION OF ATRIAL FIBRILLATION: Primary | ICD-10-CM

## 2023-04-04 DIAGNOSIS — Z98.890 STATUS POST ABLATION OF ATRIAL FIBRILLATION: Primary | ICD-10-CM

## 2023-04-04 DIAGNOSIS — I25.10 CORONARY ARTERY CALCIFICATION SEEN ON CAT SCAN: ICD-10-CM

## 2023-04-04 DIAGNOSIS — I50.32 CHRONIC HEART FAILURE WITH PRESERVED EJECTION FRACTION (HFPEF) (HCC): ICD-10-CM

## 2023-04-04 DIAGNOSIS — Z98.890 STATUS POST ABLATION OF ATRIAL FLUTTER: ICD-10-CM

## 2023-04-04 PROBLEM — I25.84 CORONARY ARTERY CALCIFICATION OF NATIVE ARTERY: Status: RESOLVED | Noted: 2021-06-24 | Resolved: 2023-04-04

## 2023-04-04 PROCEDURE — G8417 CALC BMI ABV UP PARAM F/U: HCPCS | Performed by: INTERNAL MEDICINE

## 2023-04-04 PROCEDURE — 99214 OFFICE O/P EST MOD 30 MIN: CPT | Performed by: INTERNAL MEDICINE

## 2023-04-04 PROCEDURE — 1036F TOBACCO NON-USER: CPT | Performed by: INTERNAL MEDICINE

## 2023-04-04 PROCEDURE — G8427 DOCREV CUR MEDS BY ELIG CLIN: HCPCS | Performed by: INTERNAL MEDICINE

## 2023-04-04 PROCEDURE — 3017F COLORECTAL CA SCREEN DOC REV: CPT | Performed by: INTERNAL MEDICINE

## 2023-04-04 PROCEDURE — G8399 PT W/DXA RESULTS DOCUMENT: HCPCS | Performed by: INTERNAL MEDICINE

## 2023-04-04 PROCEDURE — 1090F PRES/ABSN URINE INCON ASSESS: CPT | Performed by: INTERNAL MEDICINE

## 2023-04-04 PROCEDURE — 1123F ACP DISCUSS/DSCN MKR DOCD: CPT | Performed by: INTERNAL MEDICINE

## 2023-04-04 RX ORDER — METOPROLOL SUCCINATE 25 MG/1
25 TABLET, EXTENDED RELEASE ORAL DAILY
Qty: 90 TABLET | Refills: 3 | Status: SHIPPED | OUTPATIENT
Start: 2023-04-04

## 2023-04-04 RX ORDER — PROPAFENONE HYDROCHLORIDE 150 MG/1
150 TABLET, COATED ORAL 2 TIMES DAILY
Qty: 180 TABLET | Refills: 3 | Status: CANCELLED | OUTPATIENT
Start: 2023-04-04

## 2023-05-15 ENCOUNTER — TRANSCRIBE ORDERS (OUTPATIENT)
Dept: SCHEDULING | Age: 73
End: 2023-05-15

## 2023-05-15 DIAGNOSIS — Z12.31 SCREENING MAMMOGRAM FOR HIGH-RISK PATIENT: Primary | ICD-10-CM

## 2023-05-19 ENCOUNTER — TELEPHONE (OUTPATIENT)
Age: 73
End: 2023-05-19

## 2023-05-19 ENCOUNTER — PATIENT MESSAGE (OUTPATIENT)
Age: 73
End: 2023-05-19

## 2023-05-19 NOTE — TELEPHONE ENCOUNTER
Worsening constant aching leg pain and weakness since starting Crestor 10 mg qd 6-7 months ago. Per Statin Protocol, advised patient to hold Crestor for 2-3 weeks and call with report. Advised patient that Dr. Vilma Saxena will make further recommendations at that time. Patient verbalized understanding.

## 2023-05-19 NOTE — TELEPHONE ENCOUNTER
----- Message from Ponce Bradford sent at 5/19/2023  8:38 AM EDT -----  Regarding: FW: Talia  Contact: [phone]    ----- Message -----  From: Hung Lu \"Reema\"  Sent: 5/19/2023   8:30 AM EDT  To: [facility] Clinical Staff  Subject: Talia                                      This is Jeremias Ortega 1950. I have had continued leg pain and weakness since taking this statin. Is there another medication I can take ? This was prescribed by you. Please let me know as soon as possible. Thank you.   If you need to call my phone number is [phone]

## 2023-07-06 ENCOUNTER — TELEPHONE (OUTPATIENT)
Age: 73
End: 2023-07-06

## 2023-07-06 ENCOUNTER — HOSPITAL ENCOUNTER (OUTPATIENT)
Dept: MAMMOGRAPHY | Age: 73
Discharge: HOME | End: 2023-07-06
Payer: MEDICARE

## 2023-07-06 ENCOUNTER — HOSPITAL ENCOUNTER (OUTPATIENT)
Dept: MAMMOGRAPHY | Age: 73
End: 2023-07-06
Payer: MEDICARE

## 2023-07-06 DIAGNOSIS — N64.52 NIPPLE DISCHARGE: ICD-10-CM

## 2023-07-06 PROCEDURE — 76642 ULTRASOUND BREAST LIMITED: CPT

## 2023-07-06 PROCEDURE — G0279 TOMOSYNTHESIS, MAMMO: HCPCS

## 2023-07-06 NOTE — TELEPHONE ENCOUNTER
Patient having Breast US biopsy on 07/19/23  under Local. Requesting Xarelto hold for 3 to 5 days prior.  Fax: [phone] ATTN: Christo Ortiz

## 2023-07-07 ENCOUNTER — TELEPHONE (OUTPATIENT)
Dept: MAMMOGRAPHY | Age: 73
End: 2023-07-07

## 2023-07-07 NOTE — TELEPHONE ENCOUNTER
I clarified w/pt. if on Eliquis or Xarelto. She confirms she is on Eliquis. [facility] notified and can see this note in Epic.

## 2023-07-07 NOTE — TELEPHONE ENCOUNTER
She has Eliquis in the prescription list.  Please ensure the list is accurate in the EMR.   The recommendations for both Xarelto and Eliquis are as follows:     Recommend management of Eliquis perioperatively, as below, with bleeding risk determined by the      High bleeding risk   - give Eliquis last dose three days before procedure (ie, skip four doses on the two days before the procedure)   - resume Eliquis 48 to 72 hours after surgery (ie, postoperative day 2 to 3)     Low bleeding risk   - give Eliquis last dose two days before procedure (ie, skip two doses on the day before the procedure)   - resume Eliquis 24 hours after surgery (ie, postoperative day 1)     Recommend management of Xarelto perioperatively, as below, with bleeding risk determined by the      High bleeding risk   - give last dose Xarelto three days before procedure (ie, skip two doses on the two days before the procedure)   - resume Xarelto 48 to 72 hours after surgery (ie, postoperative day 2 to 3)     Low bleeding risk   - give last dose Xarelto two days before procedure (ie, skip one dose on the day before the procedure)   - resume Xarelto 24 hours after surgery (ie, postoperative day 1)

## 2023-07-18 ENCOUNTER — OFFICE VISIT (OUTPATIENT)
Dept: UROLOGY | Age: 73
End: 2023-07-18
Payer: MEDICARE

## 2023-07-18 DIAGNOSIS — N39.41 URGE INCONTINENCE OF URINE: Primary | ICD-10-CM

## 2023-07-18 LAB
BILIRUBIN, URINE, POC: NEGATIVE
BLOOD URINE, POC: NEGATIVE
GLUCOSE URINE, POC: NEGATIVE
KETONES, URINE, POC: NEGATIVE
LEUKOCYTE ESTERASE, URINE, POC: NEGATIVE
NITRITE, URINE, POC: NEGATIVE
PH, URINE, POC: 5.5 (ref 4.6–8)
PROTEIN,URINE, POC: NEGATIVE
SPECIFIC GRAVITY, URINE, POC: 1.02 (ref 1–1.03)
URINALYSIS CLARITY, POC: NORMAL
URINALYSIS COLOR, POC: NORMAL
UROBILINOGEN, POC: NORMAL

## 2023-07-18 PROCEDURE — 1036F TOBACCO NON-USER: CPT | Performed by: NURSE PRACTITIONER

## 2023-07-18 PROCEDURE — 1090F PRES/ABSN URINE INCON ASSESS: CPT | Performed by: NURSE PRACTITIONER

## 2023-07-18 PROCEDURE — 3017F COLORECTAL CA SCREEN DOC REV: CPT | Performed by: NURSE PRACTITIONER

## 2023-07-18 PROCEDURE — 81003 URINALYSIS AUTO W/O SCOPE: CPT | Performed by: NURSE PRACTITIONER

## 2023-07-18 PROCEDURE — G8399 PT W/DXA RESULTS DOCUMENT: HCPCS | Performed by: NURSE PRACTITIONER

## 2023-07-18 PROCEDURE — 1123F ACP DISCUSS/DSCN MKR DOCD: CPT | Performed by: NURSE PRACTITIONER

## 2023-07-18 PROCEDURE — G8417 CALC BMI ABV UP PARAM F/U: HCPCS | Performed by: NURSE PRACTITIONER

## 2023-07-18 PROCEDURE — G8427 DOCREV CUR MEDS BY ELIG CLIN: HCPCS | Performed by: NURSE PRACTITIONER

## 2023-07-18 PROCEDURE — 99213 OFFICE O/P EST LOW 20 MIN: CPT | Performed by: NURSE PRACTITIONER

## 2023-07-18 PROCEDURE — 0509F URINE INCON PLAN DOCD: CPT | Performed by: NURSE PRACTITIONER

## 2023-07-18 ASSESSMENT — ENCOUNTER SYMPTOMS: BACK PAIN: 0

## 2023-07-18 NOTE — PROGRESS NOTES
Fibrocystic breast disease     Former cigarette smoker     GERD (gastroesophageal reflux disease)     controlled with omeprazole daily    H/O partial adrenalectomy ([address])     right adrenalectomy (benign tumor)    History of bone density study 2017    Dexa 2017:  Wnl.  10 year fracture risk (FRAX score): Any Fracture:  7.9%  Hip fracture:  0.7%    History of colonoscopy 2017    Colonoscopy current (2017 Dr. Teo Jules --> diverticulitis and tortuous colon, R 10 y). 2018 f/u ([place] noncardiac CP, likely esophageal spasm). Pt cxd EGD. [facility] Jeremiah, APRN.  -->resched EGD w/ Dr. Teo Jules (see [facility] media\" 2/7/2020 \"Referral order/[facility]\" office note)      History of Helicobacter pylori infection 9/2014    treated with 2 different antibiotics and PPI    HNP (herniated nucleus pulposus), lumbar     Lumbar radiculopathy     Morbid obesity ([address])     BMI 40.7    Nausea & vomiting     pt does well with antiemetic    Neck sprain     & STRAIN    Overactive bladder     Paroxysmal atrial fibrillation ([address])     Spinal stenosis, cervical region     Urge incontinence     Vaginal infection Lichen sclerosis     Past Surgical History:   Procedure Laterality Date    BREAST LUMPECTOMY Left 12/12/2014    LEFT BREAST DUCTAL EXCISION @ [id number] Hubert Malave MD at [facility] MAIN OR, Left breast, 11:30 position, 5 cm  from nipple, core biopsy: Fibrocystic mastopathy having minimal intraductal hyperplasia and apocrine metaplasia.      BREAST SURGERY Left 1999    breast abscess    CHOLECYSTECTOMY  1980    COLONOSCOPY      EP DEVICE PROCEDURE N/A 11/30/2022    Ablation A-fib w complete ep study performed by Sasha Capellan MD at [facility] CATH LAB    HYSTERECTOMY ([facility])  1995    per pt retained her ovaries    ORTHOPEDIC SURGERY  2007    torn tendon right elbow    OTHER SURGICAL HISTORY      acdf    OTHER SURGICAL HISTORY  1994    right adrenalectomy (benign tumor)    UPPER GASTROINTESTINAL ENDOSCOPY

## 2023-07-19 ENCOUNTER — HOSPITAL ENCOUNTER (OUTPATIENT)
Dept: MAMMOGRAPHY | Age: 73
Discharge: HOME | End: 2023-07-22
Payer: MEDICARE

## 2023-07-19 DIAGNOSIS — R92.8 ABNORMAL ULTRASOUND OF BREAST: ICD-10-CM

## 2023-07-19 PROCEDURE — 77065 DX MAMMO INCL CAD UNI: CPT

## 2023-07-19 PROCEDURE — 2500000003 HC RX 250 WO HCPCS: Performed by: PHYSICIAN ASSISTANT

## 2023-07-19 PROCEDURE — 88305 TISSUE EXAM BY PATHOLOGIST: CPT

## 2023-07-19 PROCEDURE — 19083 BX BREAST 1ST LESION US IMAG: CPT

## 2023-07-19 RX ORDER — LIDOCAINE HYDROCHLORIDE 10 MG/ML
5 INJECTION, SOLUTION INFILTRATION; PERINEURAL ONCE
Status: COMPLETED | OUTPATIENT
Start: 2023-07-19 | End: 2023-07-19

## 2023-07-19 RX ADMIN — LIDOCAINE HYDROCHLORIDE 5 ML: 10 INJECTION, SOLUTION INFILTRATION; PERINEURAL at 08:20

## 2023-07-19 ASSESSMENT — PAIN SCALES - GENERAL: PAINLEVEL_OUTOF10: 5

## 2023-07-20 ENCOUNTER — TELEPHONE (OUTPATIENT)
Dept: MAMMOGRAPHY | Age: 73
End: 2023-07-20

## 2023-08-10 ENCOUNTER — OFFICE VISIT (OUTPATIENT)
Dept: UROLOGY | Age: 73
End: 2023-08-10

## 2023-08-10 ENCOUNTER — TELEPHONE (OUTPATIENT)
Dept: UROLOGY | Age: 73
End: 2023-08-10

## 2023-08-10 DIAGNOSIS — N39.41 URGE INCONTINENCE OF URINE: Primary | ICD-10-CM

## 2023-08-10 NOTE — PROGRESS NOTES
Patient presented today for Rep Stim Appointment. Still with bothersome UUI despite trying all 7 programs on interstim. After discussion with rep today, recommendation was made to turn interstim completley off and see how she does over the next 1 month. If UUI worsens without interstim, then we will know that it was helping. .  If UUI does not worsen, then will discuss leaving it off vs. Removal vs. Intermittent therapy on/off switches. Will return in 4 weeks for office visit follow up. Samuel Lowry M.D.     [facility] Urology  [facility], [address] [place] [address]  Phone: [phone]  Fax: [phone]

## 2023-09-11 ENCOUNTER — APPOINTMENT (OUTPATIENT)
Dept: GENERAL RADIOLOGY | Age: 73
End: 2023-09-11
Payer: MEDICARE

## 2023-09-11 ENCOUNTER — APPOINTMENT (OUTPATIENT)
Dept: CT IMAGING | Age: 73
End: 2023-09-11
Payer: MEDICARE

## 2023-09-11 ENCOUNTER — HOSPITAL ENCOUNTER (EMERGENCY)
Age: 73
Discharge: HOME | End: 2023-09-12
Attending: EMERGENCY MEDICINE
Payer: MEDICARE

## 2023-09-11 DIAGNOSIS — R07.9 CHEST PAIN, UNSPECIFIED TYPE: Primary | ICD-10-CM

## 2023-09-11 DIAGNOSIS — J40 BRONCHITIS: ICD-10-CM

## 2023-09-11 DIAGNOSIS — R51.9 ACUTE NONINTRACTABLE HEADACHE, UNSPECIFIED HEADACHE TYPE: ICD-10-CM

## 2023-09-11 LAB
ALBUMIN SERPL-MCNC: 3.9 G/DL (ref 3.2–4.6)
ALBUMIN/GLOB SERPL: 1.5 (ref 0.4–1.6)
ALP SERPL-CCNC: 56 U/L (ref 45–117)
ALT SERPL-CCNC: <5 U/L (ref 13–61)
ANION GAP SERPL CALC-SCNC: 9 MMOL/L (ref 2–11)
AST SERPL-CCNC: 12 U/L (ref 15–37)
BASOPHILS # BLD: 0.1 K/UL (ref 0–0.2)
BASOPHILS NFR BLD: 1 % (ref 0–2)
BILIRUB SERPL-MCNC: 0.7 MG/DL (ref 0.2–1.1)
BUN SERPL-MCNC: 14 MG/DL (ref 8–23)
CALCIUM SERPL-MCNC: 9.3 MG/DL (ref 8.3–10.4)
CHLORIDE SERPL-SCNC: 105 MMOL/L (ref 98–107)
CO2 SERPL-SCNC: 28 MMOL/L (ref 21–32)
CREAT SERPL-MCNC: 0.64 MG/DL (ref 0.6–1)
DIFFERENTIAL METHOD BLD: ABNORMAL
EOSINOPHIL # BLD: 0 K/UL (ref 0–0.8)
EOSINOPHIL NFR BLD: 1 % (ref 0.5–7.8)
ERYTHROCYTE [DISTWIDTH] IN BLOOD BY AUTOMATED COUNT: 12.7 % (ref 11.9–14.6)
GLOBULIN SER CALC-MCNC: 2.6 G/DL (ref 2.8–4.5)
GLUCOSE SERPL-MCNC: 117 MG/DL (ref 65–100)
HCT VFR BLD AUTO: 42.1 % (ref 35.8–46.3)
HGB BLD-MCNC: 14.2 G/DL (ref 11.7–15.4)
IMM GRANULOCYTES # BLD AUTO: 0 K/UL (ref 0–0.5)
IMM GRANULOCYTES NFR BLD AUTO: 0 % (ref 0–5)
LYMPHOCYTES # BLD: 1.1 K/UL (ref 0.5–4.6)
LYMPHOCYTES NFR BLD: 15 % (ref 13–44)
MAGNESIUM SERPL-MCNC: 1.9 MG/DL (ref 1.2–2.6)
MCH RBC QN AUTO: 31.2 PG (ref 26.1–32.9)
MCHC RBC AUTO-ENTMCNC: 33.7 G/DL (ref 31.4–35)
MCV RBC AUTO: 92.5 FL (ref 82–102)
MONOCYTES # BLD: 0.5 K/UL (ref 0.1–1.3)
MONOCYTES NFR BLD: 7 % (ref 4–12)
NEUTS SEG # BLD: 5.3 K/UL (ref 1.7–8.2)
NEUTS SEG NFR BLD: 76 % (ref 43–78)
NRBC # BLD: 0 K/UL (ref 0–0.2)
PLATELET # BLD AUTO: 147 K/UL (ref 150–450)
PMV BLD AUTO: 10.7 FL (ref 9.4–12.3)
POTASSIUM SERPL-SCNC: 4.4 MMOL/L (ref 3.5–5.1)
PROT SERPL-MCNC: 6.5 G/DL (ref 6.4–8.2)
RBC # BLD AUTO: 4.55 M/UL (ref 4.05–5.2)
SODIUM SERPL-SCNC: 142 MMOL/L (ref 133–143)
TROPONIN T SERPL HS-MCNC: 8.3 NG/L (ref 0–14)
TROPONIN T SERPL HS-MCNC: 9.1 NG/L (ref 0–14)
WBC # BLD AUTO: 7 K/UL (ref 4.3–11.1)

## 2023-09-11 PROCEDURE — 80053 COMPREHEN METABOLIC PANEL: CPT

## 2023-09-11 PROCEDURE — 85025 COMPLETE CBC W/AUTO DIFF WBC: CPT

## 2023-09-11 PROCEDURE — 93005 ELECTROCARDIOGRAM TRACING: CPT | Performed by: EMERGENCY MEDICINE

## 2023-09-11 PROCEDURE — 70450 CT HEAD/BRAIN W/O DYE: CPT

## 2023-09-11 PROCEDURE — 6370000000 HC RX 637 (ALT 250 FOR IP): Performed by: EMERGENCY MEDICINE

## 2023-09-11 PROCEDURE — 83735 ASSAY OF MAGNESIUM: CPT

## 2023-09-11 PROCEDURE — 84484 ASSAY OF TROPONIN QUANT: CPT

## 2023-09-11 PROCEDURE — 99285 EMERGENCY DEPT VISIT HI MDM: CPT

## 2023-09-11 PROCEDURE — 71045 X-RAY EXAM CHEST 1 VIEW: CPT

## 2023-09-11 RX ORDER — BUTALBITAL, ACETAMINOPHEN AND CAFFEINE 300; 40; 50 MG/1; MG/1; MG/1
1 CAPSULE ORAL EVERY 8 HOURS PRN
Qty: 15 CAPSULE | Refills: 0 | Status: SHIPPED | OUTPATIENT
Start: 2023-09-11 | End: 2023-09-16

## 2023-09-11 RX ORDER — DOXYCYCLINE HYCLATE 100 MG
100 TABLET ORAL 2 TIMES DAILY
Qty: 14 TABLET | Refills: 0 | Status: SHIPPED | OUTPATIENT
Start: 2023-09-11 | End: 2023-09-18

## 2023-09-11 RX ORDER — BUTALBITAL, ACETAMINOPHEN AND CAFFEINE 50; 325; 40 MG/1; MG/1; MG/1
1 TABLET ORAL ONCE
Status: COMPLETED | OUTPATIENT
Start: 2023-09-11 | End: 2023-09-11

## 2023-09-11 RX ADMIN — BUTALBITAL, ACETAMINOPHEN, AND CAFFEINE 1 TABLET: 50; 325; 40 TABLET ORAL at 23:42

## 2023-09-11 ASSESSMENT — PAIN - FUNCTIONAL ASSESSMENT: PAIN_FUNCTIONAL_ASSESSMENT: 0-10

## 2023-09-11 ASSESSMENT — LIFESTYLE VARIABLES
HOW MANY STANDARD DRINKS CONTAINING ALCOHOL DO YOU HAVE ON A TYPICAL DAY: 1 OR 2
HOW OFTEN DO YOU HAVE A DRINK CONTAINING ALCOHOL: 2-4 TIMES A MONTH

## 2023-09-11 ASSESSMENT — ENCOUNTER SYMPTOMS
COUGH: 1
BACK PAIN: 0
GASTROINTESTINAL NEGATIVE: 1
SHORTNESS OF BREATH: 1

## 2023-09-11 ASSESSMENT — PAIN SCALES - GENERAL: PAINLEVEL_OUTOF10: 4

## 2023-09-12 VITALS
DIASTOLIC BLOOD PRESSURE: 77 MMHG | HEIGHT: 67 IN | WEIGHT: 269 LBS | OXYGEN SATURATION: 96 % | BODY MASS INDEX: 42.22 KG/M2 | TEMPERATURE: 99.2 F | SYSTOLIC BLOOD PRESSURE: 131 MMHG | RESPIRATION RATE: 18 BRPM | HEART RATE: 68 BPM

## 2023-09-12 LAB
EKG ATRIAL RATE: 70 BPM
EKG DIAGNOSIS: NORMAL
EKG P AXIS: 61 DEGREES
EKG P-R INTERVAL: 140 MS
EKG Q-T INTERVAL: 398 MS
EKG QRS DURATION: 106 MS
EKG QTC CALCULATION (BAZETT): 427 MS
EKG R AXIS: 46 DEGREES
EKG T AXIS: 62 DEGREES
EKG VENTRICULAR RATE: 69 BPM

## 2023-09-12 PROCEDURE — 93010 ELECTROCARDIOGRAM REPORT: CPT | Performed by: INTERNAL MEDICINE

## 2023-09-12 NOTE — ED PROVIDER NOTES
Morbid obesity ([address])     BMI 40.7    Nausea & vomiting     pt does well with antiemetic    Neck sprain     & STRAIN    Overactive bladder     Paroxysmal atrial fibrillation ([address])     Spinal stenosis, cervical region     Urge incontinence     Vaginal infection Lichen sclerosis        Past Surgical History:   Procedure Laterality Date    BREAST LUMPECTOMY Left 2014    LEFT BREAST DUCTAL EXCISION @ 12 O'CLOCK  Vivian Smith MD at [facility] MAIN OR, Left breast, 11:30 position, 5 cm  from nipple, core biopsy: Fibrocystic mastopathy having minimal intraductal hyperplasia and apocrine metaplasia. BREAST SURGERY Left     breast abscess    CHOLECYSTECTOMY      COLONOSCOPY      EP DEVICE PROCEDURE N/A 2022    Ablation A-fib w complete ep study performed by Jordan Monroy MD at [facility]    [facility]Y ( [facility])      per pt retained her ovaries    ORTHOPEDIC SURGERY      torn tendon right elbow    OTHER SURGICAL HISTORY      acdf    OTHER SURGICAL HISTORY      right adrenalectomy (benign tumor)    UPPER GASTROINTESTINAL ENDOSCOPY      US BREAST BIOPSY W LOC DEVICE 1ST LESION RIGHT Right 2023    US BREAST BIOPSY W LOC DEVICE 1ST LESION RIGHT 2023 Jefferson Garg MD [facility]    US GUIDED CORE BREAST BIOPSY          Social History     Socioeconomic History    Marital status:    Tobacco Use    Smoking status: Former     Packs/day: 0.25     Years: 10.00     Additional pack years: 0.00     Total pack years: 2.50     Types: Cigarettes     Quit date: 1990     Years since quittin.7    Smokeless tobacco: Never   Substance and Sexual Activity    Alcohol use: Yes    Drug use: No    Sexual activity: Not Currently     Partners: Male     Comment: hysterectomy   Social History Narrative    Pt is primary caregiver for  who just suffered another stroke; going home later this week.     IM:  Dr. Afshan Guerrero  Derm:  Dr. Claudia Jensen.

## 2023-09-12 NOTE — ED TRIAGE NOTES
Pt came into ED c/o chest tightness and shortness of breath starting about an hour ago, history of afib with ablation but feels like she went back into afib today, also had headache.

## 2023-10-22 NOTE — PROGRESS NOTES
[facility] CARDIOLOGY Follow Up                 Reason for Visit: Chronic HFpEF    Subjective:     Patient is a 75 y.o. female with a PMH of chronic HFpEF, atrial fibrillation status post ablation, atrial flutter status post ablation, hyperlipidemia, and coronary artery calcification noted on CAT scan who presents for follow-up. The patient was last seen in 2023. She had a TTE in 2023 that was noted to demonstrate a normal EF. Propafenone was discontinued. She visited the ER in early September for chest pain. Troponin was normal x2. She had an ECG on  that noted sinus rhythm and a PVC. The patient had essentially normal coronary arteries noted on [facility] in 2020. The patient reports \"it ended up being bronchitis\" because she had a cough. She reports \"I took a round of antibiotics\". She reports the chest pain was worse with coughing. The patient denies hemoptysis. She is grieving because her daughter and her unborn grandchild  suddenly in 2023. Past Medical History:   Diagnosis Date    Acute cervical radiculopathy     Arthralgia of left shoulder region     Arthritis     fingers    Cervicalgia     Chest pain     pt denies CP or SOB    Chronic pain     right shoulder, sciatica right hip    Claustrophobia 2017    Coronary disease     Followed by [facility].     COVID-19 vaccine series completed     Pfizer vaccine completed X3 doses    Current use of long term anticoagulation     Eliquis and Aspirin    Depression     controlled with sertraline daily    Displacement of cervical intervertebral disc without myelopathy     Exogenous obesity     Extremity pain     Fibrocystic breast disease     Former cigarette smoker     GERD (gastroesophageal reflux disease)     controlled with omeprazole daily    H/O partial adrenalectomy ([address])     right adrenalectomy (benign tumor)    History of bone density study 2017    Dexa 2017:  Wnl.  10 year fracture risk (FRAX score):

## 2023-10-23 ENCOUNTER — OFFICE VISIT (OUTPATIENT)
Age: 73
End: 2023-10-23
Payer: MEDICARE

## 2023-10-23 VITALS
WEIGHT: 270 LBS | DIASTOLIC BLOOD PRESSURE: 84 MMHG | SYSTOLIC BLOOD PRESSURE: 118 MMHG | HEART RATE: 68 BPM | BODY MASS INDEX: 42.38 KG/M2 | HEIGHT: 67 IN

## 2023-10-23 DIAGNOSIS — I50.32 CHRONIC HEART FAILURE WITH PRESERVED EJECTION FRACTION (HFPEF) (HCC): ICD-10-CM

## 2023-10-23 DIAGNOSIS — Z98.890 STATUS POST ABLATION OF ATRIAL FLUTTER: ICD-10-CM

## 2023-10-23 DIAGNOSIS — Z87.898 HISTORY OF CHEST PAIN: Primary | ICD-10-CM

## 2023-10-23 DIAGNOSIS — Z86.79 STATUS POST ABLATION OF ATRIAL FIBRILLATION: ICD-10-CM

## 2023-10-23 DIAGNOSIS — Z86.79 STATUS POST ABLATION OF ATRIAL FLUTTER: ICD-10-CM

## 2023-10-23 DIAGNOSIS — Z98.890 STATUS POST ABLATION OF ATRIAL FIBRILLATION: ICD-10-CM

## 2023-10-23 DIAGNOSIS — I25.10 CORONARY ARTERY CALCIFICATION SEEN ON CAT SCAN: ICD-10-CM

## 2023-10-23 PROCEDURE — G8484 FLU IMMUNIZE NO ADMIN: HCPCS | Performed by: INTERNAL MEDICINE

## 2023-10-23 PROCEDURE — G8417 CALC BMI ABV UP PARAM F/U: HCPCS | Performed by: INTERNAL MEDICINE

## 2023-10-23 PROCEDURE — 1090F PRES/ABSN URINE INCON ASSESS: CPT | Performed by: INTERNAL MEDICINE

## 2023-10-23 PROCEDURE — 1123F ACP DISCUSS/DSCN MKR DOCD: CPT | Performed by: INTERNAL MEDICINE

## 2023-10-23 PROCEDURE — 1036F TOBACCO NON-USER: CPT | Performed by: INTERNAL MEDICINE

## 2023-10-23 PROCEDURE — 99214 OFFICE O/P EST MOD 30 MIN: CPT | Performed by: INTERNAL MEDICINE

## 2023-10-23 PROCEDURE — G8399 PT W/DXA RESULTS DOCUMENT: HCPCS | Performed by: INTERNAL MEDICINE

## 2023-10-23 PROCEDURE — 3017F COLORECTAL CA SCREEN DOC REV: CPT | Performed by: INTERNAL MEDICINE

## 2023-10-23 PROCEDURE — G8427 DOCREV CUR MEDS BY ELIG CLIN: HCPCS | Performed by: INTERNAL MEDICINE

## 2023-10-23 RX ORDER — PRAVASTATIN SODIUM 20 MG
20 TABLET ORAL DAILY
Qty: 90 TABLET | Refills: 3 | Status: SHIPPED | OUTPATIENT
Start: 2023-10-23

## 2023-10-23 RX ORDER — LORATADINE 10 MG/1
10 TABLET ORAL DAILY
COMMUNITY

## 2023-11-12 ENCOUNTER — APPOINTMENT (OUTPATIENT)
Dept: GENERAL RADIOLOGY | Age: 73
End: 2023-11-12
Payer: MEDICARE

## 2023-11-12 ENCOUNTER — HOSPITAL ENCOUNTER (EMERGENCY)
Age: 73
Discharge: HOME | End: 2023-11-12
Payer: MEDICARE

## 2023-11-12 VITALS
SYSTOLIC BLOOD PRESSURE: 146 MMHG | HEART RATE: 60 BPM | BODY MASS INDEX: 42.06 KG/M2 | DIASTOLIC BLOOD PRESSURE: 74 MMHG | WEIGHT: 268 LBS | TEMPERATURE: 98.5 F | OXYGEN SATURATION: 98 % | HEIGHT: 67 IN | RESPIRATION RATE: 16 BRPM
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
## 2023-11-12 PROCEDURE — 90714 TD VACC NO PRESV 7 YRS+ IM: CPT | Performed by: NURSE PRACTITIONER

## 2023-11-12 PROCEDURE — 90471 IMMUNIZATION ADMIN: CPT | Performed by: NURSE PRACTITIONER

## 2023-11-12 PROCEDURE — 6370000000 HC RX 637 (ALT 250 FOR IP): Performed by: NURSE PRACTITIONER

## 2023-11-12 PROCEDURE — 73130 X-RAY EXAM OF HAND: CPT

## 2023-11-12 PROCEDURE — 99284 EMERGENCY DEPT VISIT MOD MDM: CPT

## 2023-11-12 PROCEDURE — 6360000002 HC RX W HCPCS: Performed by: NURSE PRACTITIONER

## 2023-11-12 RX ORDER — TETANUS AND DIPHTHERIA TOXOIDS ADSORBED 2; 2 [LF]/.5ML; [LF]/.5ML
0.5 INJECTION INTRAMUSCULAR
Status: COMPLETED | OUTPATIENT
Start: 2023-11-12 | End: 2023-11-12

## 2023-11-12 RX ORDER — AMOXICILLIN AND CLAVULANATE POTASSIUM 875; 125 MG/1; MG/1
1 TABLET, FILM COATED ORAL
Status: COMPLETED | OUTPATIENT
Start: 2023-11-12 | End: 2023-11-12

## 2023-11-12 RX ORDER — AMOXICILLIN AND CLAVULANATE POTASSIUM 875; 125 MG/1; MG/1
1 TABLET, FILM COATED ORAL 2 TIMES DAILY
Qty: 14 TABLET | Refills: 0 | Status: SHIPPED | OUTPATIENT
Start: 2023-11-12 | End: 2023-11-19

## 2023-11-12 RX ADMIN — TETANUS AND DIPHTHERIA TOXOIDS ADSORBED 0.5 ML: 2; 2 INJECTION INTRAMUSCULAR at 18:57

## 2023-11-12 RX ADMIN — AMOXICILLIN AND CLAVULANATE POTASSIUM 1 TABLET: 875; 125 TABLET, FILM COATED ORAL at 18:56

## 2023-11-12 ASSESSMENT — PAIN - FUNCTIONAL ASSESSMENT
PAIN_FUNCTIONAL_ASSESSMENT: 0-10
PAIN_FUNCTIONAL_ASSESSMENT: 0-10

## 2023-11-12 ASSESSMENT — LIFESTYLE VARIABLES
HOW MANY STANDARD DRINKS CONTAINING ALCOHOL DO YOU HAVE ON A TYPICAL DAY: PATIENT DOES NOT DRINK
HOW OFTEN DO YOU HAVE A DRINK CONTAINING ALCOHOL: NEVER

## 2023-11-12 ASSESSMENT — PAIN SCALES - GENERAL
PAINLEVEL_OUTOF10: 5
PAINLEVEL_OUTOF10: 3

## 2023-11-12 ASSESSMENT — PAIN DESCRIPTION - LOCATION: LOCATION: HAND

## 2023-11-12 ASSESSMENT — PAIN DESCRIPTION - ORIENTATION: ORIENTATION: LEFT

## 2023-11-12 NOTE — ED NOTES
Pt has bite from her dog to the posterior left hand. Pt states the dog is current on all vaccines.      Camila Kulkarni RN  11/12/23 2262

## 2023-11-12 NOTE — ED PROVIDER NOTES
Emergency Department Provider Note       PCP: LILI Le NP   Age: 75 y.o. Sex: female     DISPOSITION Decision To Discharge 11/12/2023 06:53:59 PM       ICD-10-CM    1. Dog bite, initial encounter  W54. 0XXA           Medical Decision Making     Complexity of Problems Addressed:  Complexity of Problem: 1 acute, uncomplicated illness or injury. Data Reviewed and Analyzed:  I independently ordered and reviewed each unique test.         I interpreted the X-rays. No acute fracture. Agree with radiologist impression    Discussion of management or test interpretation. 70-year-old female presents emergency department today with complaint of a dog bite to the left hand. Dog's vaccines are up-to-date. Physical exam today reveals some skin tears to the dorsal aspect of the left hand. Patient exhibits full range of motion of the hand but does have some pain with range of motion. Imaging is negative for acute fractures or foreign bodies. Wound cleansed in the ER today. Neosporin and bandage applied. No substantial sized wounds in need of repair noted on exam.  Good wound care discussed with patient. Tetanus vaccine updated in the ER and will discharge on Augmentin. Return precautions discussed. Risk of Complications and/or Morbidity of Patient Management:  Prescription drug management performed and Shared medical decision making was utilized in creating the patients health plan today. History      70-year-old female presents emergency department today with complaint of a dog bite to her left hand. Patient states that she was walking her dog and noticed that the dog was eating something. She leaned down to try to see what the dog was eating and pulled on his leash. She believes that he thought she was going to try to take away what ever he was eating and he bit her left hand. She reports some pain with movement of the hand.   She is unsure of when she had her last tetanus

## 2023-11-12 NOTE — ED NOTES
Patients wound on exterior left hand cleaned with skintegrity wound cleanser and dressed with antibiotic ointment, nonadherent pad and wrapped with Belinda Moe  11/12/23 [id number]

## 2023-11-12 NOTE — DISCHARGE INSTRUCTIONS
Your x-ray is normal.  There are no fractures. Take medication as prescribed. Wash area gently with soap and water twice a day and apply Neosporin and bandage. Monitor for any signs of infection. Return to the emergency department for any new, worsening, or concerning symptoms.

## 2023-11-12 NOTE — ED TRIAGE NOTES
Pt ambulatory to triage with daughter with c/o dog bite to the top of left hand. Reports this is her personal dog and is up to date on vaccines.

## 2024-02-05 ENCOUNTER — APPOINTMENT (OUTPATIENT)
Dept: GENERAL RADIOLOGY | Age: 74
End: 2024-02-05
Payer: MEDICARE

## 2024-02-05 ENCOUNTER — HOSPITAL ENCOUNTER (EMERGENCY)
Age: 74
Discharge: HOME | End: 2024-02-05
Payer: MEDICARE

## 2024-02-05 ENCOUNTER — APPOINTMENT (OUTPATIENT)
Dept: CT IMAGING | Age: 74
End: 2024-02-05
Payer: MEDICARE

## 2024-02-05 VITALS
HEIGHT: 67 IN | TEMPERATURE: 98.1 F | SYSTOLIC BLOOD PRESSURE: 118 MMHG | DIASTOLIC BLOOD PRESSURE: 65 MMHG | OXYGEN SATURATION: 98 % | HEART RATE: 70 BPM | BODY MASS INDEX: 40.81 KG/M2 | WEIGHT: 260 LBS | RESPIRATION RATE: 16 BRPM

## 2024-02-05 DIAGNOSIS — R51.9 LEFT FACIAL PAIN: ICD-10-CM

## 2024-02-05 DIAGNOSIS — M79.641 RIGHT HAND PAIN: ICD-10-CM
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
## 2024-02-05 PROCEDURE — 99284 EMERGENCY DEPT VISIT MOD MDM: CPT

## 2024-02-05 PROCEDURE — 70486 CT MAXILLOFACIAL W/O DYE: CPT

## 2024-02-05 PROCEDURE — 73130 X-RAY EXAM OF HAND: CPT

## 2024-02-05 PROCEDURE — 70450 CT HEAD/BRAIN W/O DYE: CPT

## 2024-02-05 RX ORDER — TRAMADOL HYDROCHLORIDE 50 MG/1
TABLET ORAL
COMMUNITY
Start: 2023-10-31

## 2024-02-05 ASSESSMENT — PAIN - FUNCTIONAL ASSESSMENT: PAIN_FUNCTIONAL_ASSESSMENT: 0-10

## 2024-02-05 ASSESSMENT — PAIN DESCRIPTION - LOCATION: LOCATION: HAND

## 2024-02-05 ASSESSMENT — PAIN SCALES - GENERAL: PAINLEVEL_OUTOF10: 5

## 2024-02-05 ASSESSMENT — PAIN DESCRIPTION - ORIENTATION: ORIENTATION: RIGHT

## 2024-02-05 NOTE — ED NOTES
Pt c/o pain to the right ringer and right hand after a fall this AM.  Denies any dizziness or LOC with the fall.  Laceration to the upper lip without bleeding at the present time

## 2024-02-05 NOTE — ED PROVIDER NOTES
Emergency Department Provider Note       PCP: Angie Moreno, APRN - NP   Age: 73 y.o.   Sex: female     DISPOSITION Decision To Discharge 02/05/2024 10:13:29 AM       ICD-10-CM    1. Fall, initial encounter  W19.XXXA       2. Right hand pain  M79.641       3. Left facial pain  R51.9           Medical Decision Making     Complexity of Problems Addressed:  Complexity of Problem: 1 acute, uncomplicated illness or injury.    Data Reviewed and Analyzed:  I independently ordered and reviewed each unique test.         I interpreted the X-rays.  Agree with radiologist  I interpreted the CT Scan.  Agree with radiologist    Discussion of management or test interpretation.  73-year-old female presented to the ED with concerns of accidental fall after tripping on steps going up to her bed as discussed in HPI.  Will proceed to CT imaging of head and maxillofacial bones given facial pain and she is currently anticoagulated.  Will also assess right hand x-ray imaging.  No acute intracranial hemorrhage or facial bone fracture seen on CT imaging per radiologist.  No acute bony fracture seen on x-ray imaging of right hand per radiologist.  Patient stable for discharge.       Risk of Complications and/or Morbidity of Patient Management:  OTC drug management performed and Shared medical decision making was utilized in creating the patients health plan today.        History      73-year-old female presents to emergency department chief complaint of accidental fall occurred earlier this morning.  Reports that she was carrying her grandchild when she tripped on some steps that go up to her bed.  Fell on both of her knees then fell forward hitting her forehead and left side of her face off either the bed frame or the floor.  Also complaining of right hand pain specifically in her fourth finger. She is currently anticoagulated with Eliquis.  Denies LOC.    The history is provided by the patient.        Physical Exam     Vitals

## 2024-02-05 NOTE — ED NOTES
I have reviewed discharge instructions with the patient.  The patient verbalized understanding.    Patient left ED via Discharge Method: ambulatory to Home with ( family, self,).    Opportunity for questions and clarification provided.       Patient given 0 scripts.         To continue your aftercare when you leave the hospital, you may receive an automated call from our care team to check in on how you are doing.  This is a free service and part of our promise to provide the best care and service to meet your aftercare needs.” If you have questions, or wish to unsubscribe from this service please call [phone].  Thank you for Choosing our [facility] Emergency Department.

## 2024-02-05 NOTE — ED TRIAGE NOTES
Pt ambulatory to triage with son. Pt states she tripped and fell this morning. Pt reports right fourth finger pain that radiates down into her hand to her wrist. Pt with upper busted lip, no bleeding at present time. Pt states she hit her head, denies loc. Pt states she takes Eliquis. Pt denies headache. Pt states she took her tramadol at 0745 this morning

## 2024-02-05 NOTE — DISCHARGE INSTRUCTIONS
There are no abnormal findings seen today on your CT scans or x-rays in the emergency department.  Take Tylenol as needed for pain relief.  Ice your hand as well for symptom relief.  Follow-up with your primary care provider as scheduled.  Return as needed.

## 2024-04-23 NOTE — PROGRESS NOTES
\"[facility]\"     Lab Results   Component Value Date/Time     09/11/2023 08:55 PM     11/30/2022 06:50 AM     11/28/2022 11:58 AM    K 4.4 09/11/2023 08:55 PM    K 3.9 11/30/2022 06:50 AM    K 4.4 11/28/2022 11:58 AM     09/11/2023 08:55 PM     11/30/2022 06:50 AM     11/28/2022 11:58 AM    CO2 28 09/11/2023 08:55 PM    CO2 30 11/30/2022 06:50 AM    CO2 29 11/28/2022 11:58 AM    BUN 14 09/11/2023 08:55 PM    BUN 21 11/30/2022 06:50 AM    BUN 16 11/28/2022 11:58 AM    CREATININE 0.64 09/11/2023 08:55 PM    CREATININE 1.00 11/30/2022 06:50 AM    CREATININE 0.80 11/28/2022 11:58 AM    GLUCOSE 117 09/11/2023 08:55 PM    GLUCOSE 112 11/30/2022 06:50 AM    GLUCOSE 100 11/28/2022 11:58 AM    CALCIUM 9.3 09/11/2023 08:55 PM    CALCIUM 9.0 11/30/2022 06:50 AM    CALCIUM 9.0 11/28/2022 11:58 AM         Lab Results   Component Value Date    ALT <5 (L) 09/11/2023    ALT 8 (L) 10/06/2022    ALT 18 12/30/2021    AST 12 (L) 09/11/2023    AST 12 (L) 10/06/2022    AST 13 12/30/2021        Assessment/Plan:   1. Chronic heart failure with preserved ejection fraction (HFpEF) ([facility])  - H2FPEF score = 8 diagnostic of HFpEF (heavy, atrial fibrillation, pulmonary hypertension, elder, filling pressure)   - Resume p.o. Lasix as needed  - Instructed the patient to take a dose of Lasix for weight gain of 2 lbs in two days or 5 lbs in one week and return to as needed dosing once the weight returns to baseline    2. Status post ablation of atrial fibrillation  3. Status post ablation of atrial flutter  - [id number] equals 3   - Continue with Eliquis and Toprol-XL    4. Coronary artery calcification seen on CAT scan  - Continue with pravastatin  - Obtain medical records of lipid profile drawn by PCP per patient    F/U: 6 months    Luis Huerta MD

## 2024-04-25 ENCOUNTER — OFFICE VISIT (OUTPATIENT)
Dept: UROLOGY | Age: 74
End: 2024-04-25
Payer: MEDICARE

## 2024-04-25 ENCOUNTER — TELEPHONE (OUTPATIENT)
Dept: UROLOGY | Age: 74
End: 2024-04-25

## 2024-04-25 ENCOUNTER — OFFICE VISIT (OUTPATIENT)
Age: 74
End: 2024-04-25
Payer: MEDICARE

## 2024-04-25 VITALS
HEART RATE: 72 BPM | SYSTOLIC BLOOD PRESSURE: 126 MMHG | HEIGHT: 67 IN | BODY MASS INDEX: 40.37 KG/M2 | DIASTOLIC BLOOD PRESSURE: 78 MMHG | WEIGHT: 257.2 LBS

## 2024-04-25 DIAGNOSIS — N32.81 OVERACTIVE BLADDER: ICD-10-CM

## 2024-04-25 DIAGNOSIS — Z86.79 STATUS POST ABLATION OF ATRIAL FLUTTER: ICD-10-CM

## 2024-04-25 DIAGNOSIS — Z86.79 STATUS POST ABLATION OF ATRIAL FIBRILLATION: ICD-10-CM

## 2024-04-25 DIAGNOSIS — I50.32 CHRONIC HEART FAILURE WITH PRESERVED EJECTION FRACTION (HFPEF) (HCC): Primary | ICD-10-CM

## 2024-04-25 DIAGNOSIS — I25.10 CORONARY ARTERY CALCIFICATION SEEN ON CAT SCAN: ICD-10-CM

## 2024-04-25 DIAGNOSIS — N32.81 OAB (OVERACTIVE BLADDER): Primary | ICD-10-CM

## 2024-04-25 DIAGNOSIS — Z98.890 STATUS POST ABLATION OF ATRIAL FIBRILLATION: ICD-10-CM

## 2024-04-25 DIAGNOSIS — N39.41 URGE URINARY INCONTINENCE: ICD-10-CM

## 2024-04-25 DIAGNOSIS — N39.41 URGE INCONTINENCE: ICD-10-CM

## 2024-04-25 DIAGNOSIS — Z98.890 STATUS POST ABLATION OF ATRIAL FLUTTER: ICD-10-CM

## 2024-04-25 PROCEDURE — G8427 DOCREV CUR MEDS BY ELIG CLIN: HCPCS | Performed by: UROLOGY

## 2024-04-25 PROCEDURE — 1123F ACP DISCUSS/DSCN MKR DOCD: CPT | Performed by: INTERNAL MEDICINE

## 2024-04-25 PROCEDURE — 74018 RADEX ABDOMEN 1 VIEW: CPT | Performed by: UROLOGY

## 2024-04-25 PROCEDURE — 1123F ACP DISCUSS/DSCN MKR DOCD: CPT | Performed by: UROLOGY

## 2024-04-25 PROCEDURE — 3017F COLORECTAL CA SCREEN DOC REV: CPT | Performed by: UROLOGY

## 2024-04-25 PROCEDURE — 1036F TOBACCO NON-USER: CPT | Performed by: INTERNAL MEDICINE

## 2024-04-25 PROCEDURE — 1090F PRES/ABSN URINE INCON ASSESS: CPT | Performed by: INTERNAL MEDICINE

## 2024-04-25 PROCEDURE — G8428 CUR MEDS NOT DOCUMENT: HCPCS | Performed by: INTERNAL MEDICINE

## 2024-04-25 PROCEDURE — 1090F PRES/ABSN URINE INCON ASSESS: CPT | Performed by: UROLOGY

## 2024-04-25 PROCEDURE — 1036F TOBACCO NON-USER: CPT | Performed by: UROLOGY

## 2024-04-25 PROCEDURE — G8399 PT W/DXA RESULTS DOCUMENT: HCPCS | Performed by: INTERNAL MEDICINE

## 2024-04-25 PROCEDURE — G8417 CALC BMI ABV UP PARAM F/U: HCPCS | Performed by: UROLOGY

## 2024-04-25 PROCEDURE — 3017F COLORECTAL CA SCREEN DOC REV: CPT | Performed by: INTERNAL MEDICINE

## 2024-04-25 PROCEDURE — 99214 OFFICE O/P EST MOD 30 MIN: CPT | Performed by: INTERNAL MEDICINE

## 2024-04-25 PROCEDURE — G8399 PT W/DXA RESULTS DOCUMENT: HCPCS | Performed by: UROLOGY

## 2024-04-25 PROCEDURE — G8417 CALC BMI ABV UP PARAM F/U: HCPCS | Performed by: INTERNAL MEDICINE

## 2024-04-25 PROCEDURE — 99214 OFFICE O/P EST MOD 30 MIN: CPT | Performed by: UROLOGY

## 2024-04-25 PROCEDURE — 0509F URINE INCON PLAN DOCD: CPT | Performed by: UROLOGY

## 2024-04-25 RX ORDER — FUROSEMIDE 40 MG/1
40 TABLET ORAL DAILY PRN
Qty: 60 TABLET | Refills: 3 | Status: SHIPPED | OUTPATIENT
Start: 2024-04-25

## 2024-04-25 ASSESSMENT — ENCOUNTER SYMPTOMS
EYE DISCHARGE: 0
COUGH: 0
VOMITING: 0
BACK PAIN: 0
INDIGESTION: 0
BLOOD IN STOOL: 0
SKIN LESIONS: 0
EYE PAIN: 0
WHEEZING: 0
CONSTIPATION: 0
HEARTBURN: 0
ABDOMINAL PAIN: 0
NAUSEA: 0
DIARRHEA: 0

## 2024-04-25 NOTE — TELEPHONE ENCOUNTER
Pt received VM and called back to move her appt time up to 1:15 with Nuria for her interstim today.

## 2024-04-25 NOTE — TELEPHONE ENCOUNTER
----- Message from Samuel Quiñones MD sent at 4/25/2024  2:48 PM EDT -----  Regarding: Surgery scheduling  Hospital: [facility]    Surgeon: Nuria    Assist: NONE    Diagnosis: Urge Urinary Incontinence    Procedure: Interstim Removal and Replacement    Posting time: 2 hours    Special Instruments Needed:  NONE    Anesthesia: MAC    Labs: CBC, BMP, U/A    Tests: EKG per anesthesia    Blood: NONE    Bowel Prep: NONE    Special Instructions: needs clerance to hold blood thinner.    Orders/HandP:     Follow up appointment: 2 week wound check with NP

## 2024-04-26 NOTE — PROGRESS NOTES
([place] noncardiac CP, likely esophageal spasm).  Pt cxd EGD. 11/2019 [facility], Farideh Blount, APRN.  -->resched EGD w/ Dr. Calvin (see \"[facility] media\" 2/7/2020 \"Referral order/[facility]\" office note)      History of Helicobacter pylori infection 9/2014    treated with 2 different antibiotics and PPI    HNP (herniated nucleus pulposus), lumbar     Lumbar radiculopathy     Morbid obesity (HCC)     BMI 40.7    Nausea & vomiting     pt does well with antiemetic    Neck sprain     & STRAIN    Overactive bladder     Paroxysmal atrial fibrillation (HCC)     Spinal stenosis, cervical region     Urge incontinence     Vaginal infection Lichen sclerosis     Past Surgical History:   Procedure Laterality Date    BREAST LUMPECTOMY Left 12/12/2014    LEFT BREAST DUCTAL EXCISION @ 12 O'CLOCK  Douige Whatley MD at [facility] MAIN OR, Left breast, 11:30 position, 5 cm  from nipple, core biopsy: Fibrocystic mastopathy having minimal intraductal hyperplasia and apocrine metaplasia.     BREAST SURGERY Left 1999    breast abscess    CHOLECYSTECTOMY  1980    COLONOSCOPY      EP DEVICE PROCEDURE N/A 11/30/2022    Ablation A-fib w complete ep study performed by Nicanor Junior MD at [facility] CARDIAC CATH LAB    HYSTERECTOMY (CERVIX STATUS UNKNOWN)  1995    per pt retained her ovaries    ORTHOPEDIC SURGERY  2007    torn tendon right elbow    OTHER SURGICAL HISTORY      acdf    OTHER SURGICAL HISTORY  1994    right adrenalectomy (benign tumor)    UPPER GASTROINTESTINAL ENDOSCOPY      US BREAST BIOPSY W LOC DEVICE 1ST LESION RIGHT Right 7/19/2023    US BREAST BIOPSY W LOC DEVICE 1ST LESION RIGHT 7/19/2023 Dnenis Guevara MD [facility]    US GUIDED CORE BREAST BIOPSY       Current Outpatient Medications   Medication Sig Dispense Refill    furosemide (LASIX) 40 MG tablet Take 1 tablet by mouth daily as needed (Hypervolemia) Take a dose of Lasix for weight gain of 2 lbs in two days or 5 lbs in one week and return to as needed dosing once the

## 2024-05-02 NOTE — TELEPHONE ENCOUNTER
Pt had LVMM asking about when was she going to be scheduled.  Returned pt's call and advised that I am waiting for the rep to confirm a date and also the prior auth with insurance.  Advised that tentatively looking at 6/18/24.  Pt acknowledged date is good.

## 2024-05-03 ENCOUNTER — TELEPHONE (OUTPATIENT)
Dept: UROLOGY | Age: 74
End: 2024-05-03

## 2024-05-03 PROBLEM — N32.81 OVERACTIVE BLADDER: Status: ACTIVE | Noted: 2024-04-25

## 2024-05-03 PROBLEM — N39.41 URGE INCONTINENCE: Status: ACTIVE | Noted: 2024-04-25

## 2024-05-03 NOTE — TELEPHONE ENCOUNTER
Med hold clearance sent to [facility].  Request sent to scheduling for Nuria 6/18/24.  PAT 6/11/24.

## 2024-05-03 NOTE — TELEPHONE ENCOUNTER
Luis Huerta MD Keener, Lynn F RN  Caller: Unspecified (Today, 10:36 AM)  Recommend management of Eliquis perioperatively, as below, with bleeding risk determined by the     High bleeding risk  - give Eliquis last dose three days before procedure (ie, skip four doses on the two days before the procedure)  - resume Eliquis 48 to 72 hours after surgery (ie, postoperative day 2 to 3)    Low bleeding risk  - give Eliquis last dose two days before procedure (ie, skip two doses on the day before the procedure)  - resume Eliquis 24 hours after surgery (ie, postoperative day 1)

## 2024-05-03 NOTE — TELEPHONE ENCOUNTER
Cardiac Pre-operative Assessment      Physician or Practice Requesting Medication Clearance or Risk Assessment: Samuel Quiñones    : Margarita Díaz    Contact Phone Number: [phone]    Fax Number: [phone]    Date of Surgery/Procedure: 6/18/24    Type of Surgery or Procedure: sacral nerve stimulator - interstim      Medications to hold Eliquis    # Days pre-procedure to hold 3 days    Restart medication _________    Risk assessment:High

## 2024-05-03 NOTE — TELEPHONE ENCOUNTER
Procedures: Procedure(s):   INTERSTIM REMOVAL AND REPLACEMENT   Date: 6/18/2024   Time: 0800   Location: [facility] MAIN OR 03     Scheduled.  Please complete orders.  Post op appt scheduled.

## 2024-05-03 NOTE — TELEPHONE ENCOUNTER
FAX'd this triage note giving cardiac clearance to Margarita Díaz with Dr. Samuel Quiñones at [phone].

## 2024-05-06 ENCOUNTER — PREP FOR PROCEDURE (OUTPATIENT)
Dept: UROLOGY | Age: 74
End: 2024-05-06

## 2024-05-06 DIAGNOSIS — N39.41 URGE URINARY INCONTINENCE: Primary | ICD-10-CM

## 2024-05-06 RX ORDER — SODIUM CHLORIDE 0.9 % (FLUSH) 0.9 %
5-40 SYRINGE (ML) INJECTION PRN
Status: CANCELLED | OUTPATIENT
Start: 2024-05-06

## 2024-05-06 RX ORDER — SODIUM CHLORIDE 9 MG/ML
INJECTION, SOLUTION INTRAVENOUS PRN
Status: CANCELLED | OUTPATIENT
Start: 2024-05-06

## 2024-05-06 RX ORDER — SODIUM CHLORIDE 0.9 % (FLUSH) 0.9 %
5-40 SYRINGE (ML) INJECTION EVERY 12 HOURS SCHEDULED
Status: CANCELLED | OUTPATIENT
Start: 2024-05-06

## 2024-06-02 ENCOUNTER — HOSPITAL ENCOUNTER (EMERGENCY)
Age: 74
Discharge: HOME | End: 2024-06-02
Payer: MEDICARE

## 2024-06-02 ENCOUNTER — APPOINTMENT (OUTPATIENT)
Dept: GENERAL RADIOLOGY | Age: 74
End: 2024-06-02
Payer: MEDICARE

## 2024-06-02 VITALS
WEIGHT: 260 LBS | HEIGHT: 67 IN | OXYGEN SATURATION: 96 % | SYSTOLIC BLOOD PRESSURE: 132 MMHG | DIASTOLIC BLOOD PRESSURE: 69 MMHG | HEART RATE: 60 BPM | RESPIRATION RATE: 16 BRPM | BODY MASS INDEX: 40.81 KG/M2 | TEMPERATURE: 97.5 F

## 2024-06-02 DIAGNOSIS — M25.511 ACUTE PAIN OF RIGHT SHOULDER: Primary | ICD-10-CM

## 2024-06-02 PROCEDURE — 99284 EMERGENCY DEPT VISIT MOD MDM: CPT

## 2024-06-02 PROCEDURE — 73030 X-RAY EXAM OF SHOULDER: CPT

## 2024-06-02 PROCEDURE — 96372 THER/PROPH/DIAG INJ SC/IM: CPT

## 2024-06-02 PROCEDURE — 6360000002 HC RX W HCPCS: Performed by: PHYSICIAN ASSISTANT

## 2024-06-02 RX ORDER — DEXAMETHASONE SODIUM PHOSPHATE 10 MG/ML
4 INJECTION INTRAMUSCULAR; INTRAVENOUS
Status: COMPLETED | OUTPATIENT
Start: 2024-06-02 | End: 2024-06-02

## 2024-06-02 RX ORDER — PREDNISONE 10 MG/1
10 TABLET ORAL DAILY
Qty: 5 TABLET | Refills: 0 | Status: SHIPPED | OUTPATIENT
Start: 2024-06-02 | End: 2024-06-07

## 2024-06-02 RX ADMIN — DEXAMETHASONE SODIUM PHOSPHATE 4 MG: 10 INJECTION INTRAMUSCULAR; INTRAVENOUS at 17:36

## 2024-06-02 ASSESSMENT — PAIN DESCRIPTION - DESCRIPTORS: DESCRIPTORS: TIGHTNESS

## 2024-06-02 ASSESSMENT — PAIN DESCRIPTION - ONSET: ONSET: PROGRESSIVE

## 2024-06-02 ASSESSMENT — PAIN DESCRIPTION - LOCATION: LOCATION: SHOULDER

## 2024-06-02 ASSESSMENT — PAIN SCALES - GENERAL: PAINLEVEL_OUTOF10: 10

## 2024-06-02 ASSESSMENT — PAIN - FUNCTIONAL ASSESSMENT
PAIN_FUNCTIONAL_ASSESSMENT: ACTIVITIES ARE NOT PREVENTED
PAIN_FUNCTIONAL_ASSESSMENT: 0-10

## 2024-06-02 ASSESSMENT — PAIN DESCRIPTION - FREQUENCY: FREQUENCY: CONTINUOUS

## 2024-06-02 ASSESSMENT — PAIN DESCRIPTION - PAIN TYPE: TYPE: ACUTE PAIN

## 2024-06-02 ASSESSMENT — PAIN DESCRIPTION - ORIENTATION: ORIENTATION: RIGHT

## 2024-06-02 NOTE — DISCHARGE INSTRUCTIONS
Use prednisone daily as directed along with at home tramadol with moist heat or heating pads.  Do exercises as seen on handout.  See your primary care for recheck

## 2024-06-02 NOTE — ED TRIAGE NOTES
Detail Level: Generalized Pt to the ED from home with daughter with c/o of right shoulder pain that started Friday am when she woke up. Pt states that the pain has gotten worse and now is radiating down her back and arm.    Detail Level: Simple

## 2024-06-02 NOTE — ED PROVIDER NOTES
LESION RIGHT Right 2023    US BREAST BIOPSY W LOC DEVICE 1ST LESION RIGHT 2023 Dennis Guevara MD [facility]    US GUIDED CORE BREAST BIOPSY          Social History     Socioeconomic History    Marital status:    Tobacco Use    Smoking status: Former     Current packs/day: 0.00     Average packs/day: 0.3 packs/day for 10.0 years (2.5 ttl pk-yrs)     Types: Cigarettes     Start date: 1980     Quit date: 1990     Years since quittin.5    Smokeless tobacco: Never   Substance and Sexual Activity    Alcohol use: Yes    Drug use: No    Sexual activity: Not Currently     Partners: Male     Comment: hysterectomy   Social History Narrative    Pt is primary caregiver for  who just suffered another stroke; going home later this week.    IM:  Dr. Brian Sullivan  Derm:  Dr. Ovi Jr.     10/2018 Pt's  .         Previous Medications    APIXABAN (ELIQUIS) 5 MG TABS TABLET    Take 1 tablet by mouth 2 times daily    FUROSEMIDE (LASIX) 40 MG TABLET    Take 1 tablet by mouth daily as needed (Hypervolemia) Take a dose of Lasix for weight gain of 2 lbs in two days or 5 lbs in one week and return to as needed dosing once the weight returns to baseline    METOPROLOL SUCCINATE (TOPROL XL) 25 MG EXTENDED RELEASE TABLET    Take 1 tablet by mouth daily    PRAVASTATIN (PRAVACHOL) 20 MG TABLET    Take 1 tablet by mouth daily    SERTRALINE (ZOLOFT) 50 MG TABLET    Take 1 tablet by mouth daily    TRAMADOL (ULTRAM) 50 MG TABLET    TAKE 1 TABLET BY MOUTH ONCE DAILY AS NEEDED FOR SEVERE PAIN FOR 30 DAYS    VITAMIN D 25 MCG (1000 UT) CAPS    Take by mouth daily        No results found for any visits on 24.      XR SHOULDER RIGHT (MIN 2 VIEWS)    (Results Pending)                No results for input(s): \"COVID19\" in the last 72 hours.    Voice dictation software was used during the making of this note.  This software is not perfect and grammatical and other typographical errors may be 
No

## 2024-06-11 ENCOUNTER — HOSPITAL ENCOUNTER (OUTPATIENT)
Dept: SURGERY | Age: 74
Discharge: HOME | End: 2024-06-14
Payer: MEDICARE

## 2024-06-11 ENCOUNTER — TELEPHONE (OUTPATIENT)
Dept: SURGERY | Age: 74
End: 2024-06-11

## 2024-06-11 VITALS
TEMPERATURE: 97.5 F | BODY MASS INDEX: 42.3 KG/M2 | SYSTOLIC BLOOD PRESSURE: 136 MMHG | HEART RATE: 68 BPM | OXYGEN SATURATION: 94 % | DIASTOLIC BLOOD PRESSURE: 68 MMHG | HEIGHT: 67 IN | WEIGHT: 269.5 LBS | RESPIRATION RATE: 16 BRPM

## 2024-06-11 DIAGNOSIS — N39.41 URGE URINARY INCONTINENCE: ICD-10-CM

## 2024-06-11 LAB
ANION GAP SERPL CALC-SCNC: 12 MMOL/L (ref 9–18)
APPEARANCE UR: CLEAR
BACTERIA URNS QL MICRO: ABNORMAL /HPF
BILIRUB UR QL: ABNORMAL
BUN SERPL-MCNC: 20 MG/DL (ref 8–23)
CALCIUM SERPL-MCNC: 9.1 MG/DL (ref 8.8–10.2)
CASTS URNS QL MICRO: ABNORMAL /LPF
CHLORIDE SERPL-SCNC: 105 MMOL/L (ref 98–107)
CO2 SERPL-SCNC: 25 MMOL/L (ref 20–28)
COLOR UR: ABNORMAL
CREAT SERPL-MCNC: 0.72 MG/DL (ref 0.6–1.1)
EPI CELLS #/AREA URNS HPF: ABNORMAL /HPF
ERYTHROCYTE [DISTWIDTH] IN BLOOD BY AUTOMATED COUNT: 13.2 % (ref 11.9–14.6)
GLUCOSE SERPL-MCNC: 113 MG/DL (ref 70–99)
GLUCOSE UR STRIP.AUTO-MCNC: NEGATIVE MG/DL
HCT VFR BLD AUTO: 42.8 % (ref 35.8–46.3)
HGB BLD-MCNC: 14 G/DL (ref 11.7–15.4)
HGB UR QL STRIP: NEGATIVE
KETONES UR QL STRIP.AUTO: ABNORMAL MG/DL
LEUKOCYTE ESTERASE UR QL STRIP.AUTO: ABNORMAL
MCH RBC QN AUTO: 30.4 PG (ref 26.1–32.9)
MCHC RBC AUTO-ENTMCNC: 32.7 G/DL (ref 31.4–35)
MCV RBC AUTO: 92.8 FL (ref 82–102)
NITRITE UR QL STRIP.AUTO: NEGATIVE
NRBC # BLD: 0 K/UL (ref 0–0.2)
PH UR STRIP: 6 (ref 5–9)
PLATELET # BLD AUTO: 140 K/UL (ref 150–450)
PMV BLD AUTO: 11 FL (ref 9.4–12.3)
POTASSIUM SERPL-SCNC: 4.3 MMOL/L (ref 3.5–5.1)
PROT UR STRIP-MCNC: NEGATIVE MG/DL
RBC # BLD AUTO: 4.61 M/UL (ref 4.05–5.2)
RBC #/AREA URNS HPF: ABNORMAL /HPF
SODIUM SERPL-SCNC: 142 MMOL/L (ref 136–145)
SP GR UR REFRACTOMETRY: 1.03 (ref 1–1.02)
UROBILINOGEN UR QL STRIP.AUTO: 1 EU/DL (ref 0.2–1)
WBC # BLD AUTO: 4.1 K/UL (ref 4.3–11.1)
WBC URNS QL MICRO: ABNORMAL /HPF

## 2024-06-11 PROCEDURE — 87088 URINE BACTERIA CULTURE: CPT

## 2024-06-11 PROCEDURE — 81001 URINALYSIS AUTO W/SCOPE: CPT

## 2024-06-11 PROCEDURE — 87186 SC STD MICRODIL/AGAR DIL: CPT

## 2024-06-11 PROCEDURE — 80048 BASIC METABOLIC PNL TOTAL CA: CPT

## 2024-06-11 PROCEDURE — 87086 URINE CULTURE/COLONY COUNT: CPT

## 2024-06-11 PROCEDURE — 85027 COMPLETE CBC AUTOMATED: CPT

## 2024-06-11 RX ORDER — PRAVASTATIN SODIUM 20 MG
20 TABLET ORAL NIGHTLY
COMMUNITY

## 2024-06-11 RX ORDER — IBUPROFEN 200 MG
200 TABLET ORAL AS NEEDED
COMMUNITY

## 2024-06-11 RX ORDER — ACETAMINOPHEN 500 MG
500 TABLET ORAL AS NEEDED
COMMUNITY

## 2024-06-11 ASSESSMENT — PAIN SCALES - GENERAL: PAINLEVEL_OUTOF10: 4

## 2024-06-11 ASSESSMENT — PAIN DESCRIPTION - DESCRIPTORS: DESCRIPTORS: ACHING

## 2024-06-11 ASSESSMENT — PAIN DESCRIPTION - ORIENTATION: ORIENTATION: RIGHT

## 2024-06-11 ASSESSMENT — PAIN DESCRIPTION - LOCATION: LOCATION: SHOULDER

## 2024-06-11 NOTE — PERIOP NOTE
PLEASE CONTINUE TAKING ALL PRESCRIPTION MEDICATIONS UP TO THE DAY OF SURGERY UNLESS OTHERWISE DIRECTED BELOW. You may take Tylenol, allergy,  and/or indigestion medications.     TAKE ONLY THESE MEDICATIONS ON THE DAY OF SURGERY      Sertraline (Zoloft)     Metoprolol (Toprol)        DISCONTINUE all vitamins and supplements 7 days prior to surgery. DISCONTINUE Non-Steroidal Anti-Inflammatory (NSAIDS) such as Advil, Ibuprofen, Excedrin, Motrin, and Aleve 5 days prior to surgery.     Home Medications to Hold- please continue all other medications except these.    ACV Keto Gummies- Last dose 6.20.24  Vitamin D- last dose 6.20.24   Apixaban (Eliquis)- hold 3 days prior to surgery as instructed by your surgeon office. Last dose to be taken on 6.24.24  Ibuprofen- hold 5 days prior to surgery, Last dose on 6.22.24      Comments     TENZIN-HEX shower the night before surgery and the morning of surgery.             Please do not bring home medications with you on the day of surgery unless otherwise directed by your nurse.  If you are instructed to bring home medications, please give them to your nurse as they will be administered by the nursing staff.    If you have any questions, please call [facility] [phone].    A copy of this note was provided to the patient for reference.

## 2024-06-11 NOTE — PERIOP NOTE
Patient verified name and     Order for consent  found in EHR and matches case posting; patient verified.     Type 1B surgery, Walk-in assessment complete.    Labs per surgeon: CBC, BMP, UA, UA CX; results pending.  Labs per anesthesia protocol: no additional;  EKG: EKG 23/ Eliquis recommendation in EHR noted on 5.3.2024.    Verified with Dr Quiñones office, spoke with Margarita FLORES Pt to hold Eliquis 3 days prior to procedure.    Patient provided with and instructed on educational handouts including Guide to Surgery, Preventing Surgical Site Infections, Pain Management, and [place] [facility] Brochure.    Patient answered medical/surgical history questions at their best of ability. All prior to admission medications documented in EPIC. Original medication prescription bottle were visualized during patient appointment.     Patient instructed to hold all vitamins 7 days prior to surgery and NSAIDS 5 days prior to surgery, patient verbalized understanding.     Patient teach back successful and patient demonstrates knowledge of instructions.

## 2024-06-11 NOTE — TELEPHONE ENCOUNTER
Latest Reference Range & Units 06/11/24 10:27   Sodium 136 - 145 mmol/L 142   Potassium 3.5 - 5.1 mmol/L 4.3   Chloride 98 - 107 mmol/L 105   CARBON DIOXIDE 20 - 28 mmol/L 25   BUN,BUNPL 8 - 23 MG/DL 20   Creatinine 0.60 - 1.10 MG/DL 0.72   Anion Gap 9 - 18 mmol/L 12   Est, Glom Filt Rate >60 ml/min/1.73m2 87   Glucose 70 - 99 mg/dL 113 (H)   Calcium 8.8 - 10.2 MG/DL 9.1   WBC 4.3 - 11.1 K/uL 4.1 (L)   RBC 4.05 - 5.2 M/uL 4.61   Hemoglobin Quant 11.7 - 15.4 g/dL 14.0   Hematocrit 35.8 - 46.3 % 42.8   MCV 82.0 - 102.0 FL 92.8   MCH 26.1 - 32.9 PG 30.4   MCHC 31.4 - 35.0 g/dL 32.7   MPV 9.4 - 12.3 FL 11.0   RDW 11.9 - 14.6 % 13.2   Platelet Count 150 - 450 K/uL 140 (L)   Nucleated Red Blood Cells 0.0 - 0.2 K/uL 0.00   CULTURE, URINE  Rpt   Color, UA -   DARK YELLOW   Glucose, Ur mg/dL Negative   Bilirubin, Urine NEG   SMALL !   Ketones, Urine NEG mg/dL TRACE !   Specific Gravity, UA 1.001 - 1.023   1.028 (H)   Blood, Urine NEG   Negative   Protein, UA NEG mg/dL Negative   Urobilinogen 0.2 - 1.0 EU/dL 1.0   Nitrite, Urine NEG   Negative   Leukocyte Esterase, Urine NEG   TRACE !   Appearance -   CLEAR   pH, Urine 5.0 - 9.0   6.0   Casts U2 /lpf 0-2   WBC, UA U4 /hpf 0-4   RBC, UA U5 /hpf 0-5   Epithelial Cells, UA U5 /hpf 5-10 !   Bacteria, UA NEG /hpf 1+ !

## 2024-06-11 NOTE — PERIOP NOTE
Lab reviewed. Urine abnormal with 1+ bacteria. Urine culture pending. Telephone encounter sent to Margarita ENCISO and Dr Quiñones. With abnormal lab results.

## 2024-06-13 NOTE — RESULT ENCOUNTER NOTE
Mrs. Kennedy, your pre-op urine culture is growing a bacteria.  I am waiting on the final results to return to know what antibiotic to put you on for treatment.     Best Regards,  Dr. Quiñones

## 2024-06-14 DIAGNOSIS — N30.00 ACUTE CYSTITIS WITHOUT HEMATURIA: Primary | ICD-10-CM

## 2024-06-14 LAB
BACTERIA SPEC CULT: ABNORMAL
SERVICE CMNT-IMP: ABNORMAL

## 2024-06-14 RX ORDER — SULFAMETHOXAZOLE AND TRIMETHOPRIM 800; 160 MG/1; MG/1
1 TABLET ORAL 2 TIMES DAILY
Qty: 14 TABLET | Refills: 0 | Status: SHIPPED | OUTPATIENT
Start: 2024-06-14 | End: 2024-06-21

## 2024-06-14 NOTE — RESULT ENCOUNTER NOTE
Mrs. Kennedy, your urine culture did show a UTI.  I sent bactrim to the pharmacy today for treatment.  Please start today or tomorrow.    Best Regards,  Dr. Quiñones

## 2024-06-26 ENCOUNTER — TELEPHONE (OUTPATIENT)
Dept: UROLOGY | Age: 74
End: 2024-06-26

## 2024-06-26 NOTE — TELEPHONE ENCOUNTER
Pt scheduled for surgery Friday morning has a stomach virus and wanted to know if she should continue with the surgery. Please advise.

## 2024-06-28 ENCOUNTER — ANESTHESIA EVENT (OUTPATIENT)
Dept: SURGERY | Age: 74
End: 2024-06-28
Payer: MEDICARE

## 2024-06-28 ENCOUNTER — ANESTHESIA (OUTPATIENT)
Dept: SURGERY | Age: 74
End: 2024-06-28
Payer: MEDICARE

## 2024-06-28 ENCOUNTER — APPOINTMENT (OUTPATIENT)
Dept: GENERAL RADIOLOGY | Age: 74
End: 2024-06-28
Attending: UROLOGY
Payer: MEDICARE

## 2024-06-28 ENCOUNTER — HOSPITAL ENCOUNTER (OUTPATIENT)
Age: 74
Setting detail: OUTPATIENT SURGERY
Discharge: HOME | End: 2024-06-28
Attending: UROLOGY | Admitting: UROLOGY
Payer: MEDICARE

## 2024-06-28 VITALS
HEIGHT: 67 IN | RESPIRATION RATE: 18 BRPM | HEART RATE: 76 BPM | WEIGHT: 276.8 LBS | TEMPERATURE: 97.6 F | OXYGEN SATURATION: 94 % | BODY MASS INDEX: 43.44 KG/M2 | SYSTOLIC BLOOD PRESSURE: 164 MMHG | DIASTOLIC BLOOD PRESSURE: 73 MMHG

## 2024-06-28 DIAGNOSIS — N39.41 URGE INCONTINENCE: Primary | ICD-10-CM

## 2024-06-28 LAB
APPEARANCE UR: CLEAR
BACTERIA URNS QL MICRO: NEGATIVE /HPF
BILIRUB UR QL: NEGATIVE
COLOR UR: ABNORMAL
EPI CELLS #/AREA URNS HPF: ABNORMAL /HPF
GLUCOSE UR STRIP.AUTO-MCNC: NEGATIVE MG/DL
HGB UR QL STRIP: ABNORMAL
HYALINE CASTS URNS QL MICRO: ABNORMAL /LPF
KETONES UR QL STRIP.AUTO: NEGATIVE MG/DL
LEUKOCYTE ESTERASE UR QL STRIP.AUTO: NEGATIVE
NITRITE UR QL STRIP.AUTO: NEGATIVE
PH UR STRIP: 5.5 (ref 5–9)
POTASSIUM BLD-SCNC: 3.9 MMOL/L (ref 3.5–5.1)
PROT UR STRIP-MCNC: NEGATIVE MG/DL
RBC #/AREA URNS HPF: ABNORMAL /HPF
SP GR UR REFRACTOMETRY: 1.02 (ref 1–1.02)
UROBILINOGEN UR QL STRIP.AUTO: 1 EU/DL (ref 0.2–1)
WBC URNS QL MICRO: ABNORMAL /HPF

## 2024-06-28 PROCEDURE — 2709999900 HC NON-CHARGEABLE SUPPLY: Performed by: UROLOGY

## 2024-06-28 PROCEDURE — 81001 URINALYSIS AUTO W/SCOPE: CPT

## 2024-06-28 PROCEDURE — 7100000000 HC PACU RECOVERY - FIRST 15 MIN: Performed by: UROLOGY

## 2024-06-28 PROCEDURE — 3600000003 HC SURGERY LEVEL 3 BASE: Performed by: UROLOGY

## 2024-06-28 PROCEDURE — 6360000002 HC RX W HCPCS: Performed by: UROLOGY

## 2024-06-28 PROCEDURE — 6370000000 HC RX 637 (ALT 250 FOR IP): Performed by: ANESTHESIOLOGY

## 2024-06-28 PROCEDURE — C1889 IMPLANT/INSERT DEVICE, NOC: HCPCS | Performed by: UROLOGY

## 2024-06-28 PROCEDURE — 3700000000 HC ANESTHESIA ATTENDED CARE: Performed by: UROLOGY

## 2024-06-28 PROCEDURE — 84132 ASSAY OF SERUM POTASSIUM: CPT

## 2024-06-28 PROCEDURE — 2580000003 HC RX 258: Performed by: UROLOGY

## 2024-06-28 PROCEDURE — 3700000001 HC ADD 15 MINUTES (ANESTHESIA): Performed by: UROLOGY

## 2024-06-28 PROCEDURE — C1787 PATIENT PROGR, NEUROSTIM: HCPCS | Performed by: UROLOGY

## 2024-06-28 PROCEDURE — C1767 GENERATOR, NEURO NON-RECHARG: HCPCS | Performed by: UROLOGY

## 2024-06-28 PROCEDURE — 7100000001 HC PACU RECOVERY - ADDTL 15 MIN: Performed by: UROLOGY

## 2024-06-28 PROCEDURE — 6360000002 HC RX W HCPCS

## 2024-06-28 PROCEDURE — C1897 LEAD, NEUROSTIM TEST KIT: HCPCS | Performed by: UROLOGY

## 2024-06-28 PROCEDURE — 7100000010 HC PHASE II RECOVERY - FIRST 15 MIN: Performed by: UROLOGY

## 2024-06-28 PROCEDURE — C1778 LEAD, NEUROSTIMULATOR: HCPCS | Performed by: UROLOGY

## 2024-06-28 PROCEDURE — 2500000003 HC RX 250 WO HCPCS

## 2024-06-28 PROCEDURE — 2500000003 HC RX 250 WO HCPCS: Performed by: UROLOGY

## 2024-06-28 PROCEDURE — 3600000013 HC SURGERY LEVEL 3 ADDTL 15MIN: Performed by: UROLOGY

## 2024-06-28 PROCEDURE — 2580000003 HC RX 258: Performed by: ANESTHESIOLOGY

## 2024-06-28 DEVICE — NEUROSTIMULATOR INTERSTIM X RECHRGE FREE TORQ WRNCH PROD: Type: IMPLANTABLE DEVICE | Site: BUTTOCKS | Status: FUNCTIONAL

## 2024-06-28 DEVICE — ENVELOPE PLSE GENRTR M W2.7XL2.5IN NEURO ANTIBACT ABSRB: Type: IMPLANTABLE DEVICE | Site: BUTTOCKS | Status: FUNCTIONAL

## 2024-06-28 DEVICE — LEAD NERVE STIM 4.32 MM INTERSTIM SURESCAN: Type: IMPLANTABLE DEVICE | Site: BACK | Status: FUNCTIONAL

## 2024-06-28 RX ORDER — KETOROLAC TROMETHAMINE 30 MG/ML
INJECTION, SOLUTION INTRAMUSCULAR; INTRAVENOUS PRN
Status: DISCONTINUED | OUTPATIENT
Start: 2024-06-28 | End: 2024-06-28 | Stop reason: SDUPTHER

## 2024-06-28 RX ORDER — SODIUM CHLORIDE 0.9 % (FLUSH) 0.9 %
5-40 SYRINGE (ML) INJECTION PRN
Status: DISCONTINUED | OUTPATIENT
Start: 2024-06-28 | End: 2024-06-28 | Stop reason: HOSPADM

## 2024-06-28 RX ORDER — LIDOCAINE HYDROCHLORIDE 10 MG/ML
1 INJECTION, SOLUTION INFILTRATION; PERINEURAL
Status: DISCONTINUED | OUTPATIENT
Start: 2024-06-28 | End: 2024-06-28 | Stop reason: HOSPADM

## 2024-06-28 RX ORDER — SULFAMETHOXAZOLE AND TRIMETHOPRIM 800; 160 MG/1; MG/1
1 TABLET ORAL 2 TIMES DAILY
Qty: 10 TABLET | Refills: 0 | Status: SHIPPED | OUTPATIENT
Start: 2024-06-28 | End: 2024-07-03

## 2024-06-28 RX ORDER — PROCHLORPERAZINE EDISYLATE 5 MG/ML
5 INJECTION INTRAMUSCULAR; INTRAVENOUS
Status: DISCONTINUED | OUTPATIENT
Start: 2024-06-28 | End: 2024-06-28 | Stop reason: HOSPADM

## 2024-06-28 RX ORDER — BUPIVACAINE HYDROCHLORIDE 5 MG/ML
INJECTION, SOLUTION EPIDURAL; INTRACAUDAL PRN
Status: DISCONTINUED | OUTPATIENT
Start: 2024-06-28 | End: 2024-06-28 | Stop reason: ALTCHOICE

## 2024-06-28 RX ORDER — PROPOFOL 10 MG/ML
INJECTION, EMULSION INTRAVENOUS CONTINUOUS PRN
Status: DISCONTINUED | OUTPATIENT
Start: 2024-06-28 | End: 2024-06-28 | Stop reason: SDUPTHER

## 2024-06-28 RX ORDER — SODIUM CHLORIDE 0.9 % (FLUSH) 0.9 %
5-40 SYRINGE (ML) INJECTION EVERY 12 HOURS SCHEDULED
Status: DISCONTINUED | OUTPATIENT
Start: 2024-06-28 | End: 2024-06-28 | Stop reason: HOSPADM

## 2024-06-28 RX ORDER — TRAMADOL HYDROCHLORIDE 50 MG/1
50 TABLET ORAL EVERY 6 HOURS PRN
Qty: 20 TABLET | Refills: 0 | Status: SHIPPED | OUTPATIENT
Start: 2024-06-28 | End: 2024-07-05

## 2024-06-28 RX ORDER — SODIUM CHLORIDE 9 MG/ML
INJECTION, SOLUTION INTRAVENOUS PRN
Status: DISCONTINUED | OUTPATIENT
Start: 2024-06-28 | End: 2024-06-28 | Stop reason: HOSPADM

## 2024-06-28 RX ORDER — HYDROMORPHONE HYDROCHLORIDE 2 MG/ML
0.5 INJECTION, SOLUTION INTRAMUSCULAR; INTRAVENOUS; SUBCUTANEOUS EVERY 5 MIN PRN
Status: DISCONTINUED | OUTPATIENT
Start: 2024-06-28 | End: 2024-06-28 | Stop reason: HOSPADM

## 2024-06-28 RX ORDER — OXYCODONE HYDROCHLORIDE 5 MG/1
5 TABLET ORAL
Status: COMPLETED | OUTPATIENT
Start: 2024-06-28 | End: 2024-06-28

## 2024-06-28 RX ORDER — LIDOCAINE HYDROCHLORIDE 20 MG/ML
INJECTION, SOLUTION EPIDURAL; INFILTRATION; INTRACAUDAL; PERINEURAL PRN
Status: DISCONTINUED | OUTPATIENT
Start: 2024-06-28 | End: 2024-06-28 | Stop reason: SDUPTHER

## 2024-06-28 RX ORDER — FENTANYL CITRATE 50 UG/ML
INJECTION, SOLUTION INTRAMUSCULAR; INTRAVENOUS PRN
Status: DISCONTINUED | OUTPATIENT
Start: 2024-06-28 | End: 2024-06-28 | Stop reason: SDUPTHER

## 2024-06-28 RX ORDER — NALOXONE HYDROCHLORIDE 0.4 MG/ML
INJECTION, SOLUTION INTRAMUSCULAR; INTRAVENOUS; SUBCUTANEOUS PRN
Status: DISCONTINUED | OUTPATIENT
Start: 2024-06-28 | End: 2024-06-28 | Stop reason: HOSPADM

## 2024-06-28 RX ORDER — MIDAZOLAM HYDROCHLORIDE 2 MG/2ML
2 INJECTION, SOLUTION INTRAMUSCULAR; INTRAVENOUS
Status: DISCONTINUED | OUTPATIENT
Start: 2024-06-28 | End: 2024-06-28 | Stop reason: HOSPADM

## 2024-06-28 RX ORDER — SODIUM CHLORIDE, SODIUM LACTATE, POTASSIUM CHLORIDE, CALCIUM CHLORIDE 600; 310; 30; 20 MG/100ML; MG/100ML; MG/100ML; MG/100ML
INJECTION, SOLUTION INTRAVENOUS CONTINUOUS
Status: DISCONTINUED | OUTPATIENT
Start: 2024-06-28 | End: 2024-06-28 | Stop reason: HOSPADM

## 2024-06-28 RX ORDER — ACETAMINOPHEN 500 MG
1000 TABLET ORAL ONCE
Status: COMPLETED | OUTPATIENT
Start: 2024-06-28 | End: 2024-06-28

## 2024-06-28 RX ORDER — LIDOCAINE HYDROCHLORIDE 10 MG/ML
INJECTION, SOLUTION INFILTRATION; PERINEURAL PRN
Status: DISCONTINUED | OUTPATIENT
Start: 2024-06-28 | End: 2024-06-28 | Stop reason: ALTCHOICE

## 2024-06-28 RX ORDER — GENTAMICIN SULFATE 80 MG/100ML
80 INJECTION, SOLUTION INTRAVENOUS
Status: COMPLETED | OUTPATIENT
Start: 2024-06-28 | End: 2024-06-28

## 2024-06-28 RX ADMIN — KETOROLAC TROMETHAMINE 15 MG: 30 INJECTION, SOLUTION INTRAMUSCULAR at 14:35

## 2024-06-28 RX ADMIN — SODIUM CHLORIDE, POTASSIUM CHLORIDE, SODIUM LACTATE AND CALCIUM CHLORIDE: 600; 310; 30; 20 INJECTION, SOLUTION INTRAVENOUS at 11:51

## 2024-06-28 RX ADMIN — FENTANYL CITRATE 25 MCG: 50 INJECTION, SOLUTION INTRAMUSCULAR; INTRAVENOUS at 13:33

## 2024-06-28 RX ADMIN — VANCOMYCIN HYDROCHLORIDE 1750 MG: 10 INJECTION, POWDER, LYOPHILIZED, FOR SOLUTION INTRAVENOUS at 12:51

## 2024-06-28 RX ADMIN — PROPOFOL 140 MCG/KG/MIN: 10 INJECTION, EMULSION INTRAVENOUS at 12:51

## 2024-06-28 RX ADMIN — FENTANYL CITRATE 25 MCG: 50 INJECTION, SOLUTION INTRAMUSCULAR; INTRAVENOUS at 14:03

## 2024-06-28 RX ADMIN — FENTANYL CITRATE 25 MCG: 50 INJECTION, SOLUTION INTRAMUSCULAR; INTRAVENOUS at 13:08

## 2024-06-28 RX ADMIN — GENTAMICIN SULFATE 80 MG: 80 INJECTION, SOLUTION INTRAVENOUS at 11:50

## 2024-06-28 RX ADMIN — LIDOCAINE HYDROCHLORIDE 100 MG: 20 INJECTION, SOLUTION EPIDURAL; INFILTRATION; INTRACAUDAL; PERINEURAL at 12:51

## 2024-06-28 RX ADMIN — FENTANYL CITRATE 25 MCG: 50 INJECTION, SOLUTION INTRAMUSCULAR; INTRAVENOUS at 13:54

## 2024-06-28 RX ADMIN — ACETAMINOPHEN 1000 MG: 500 TABLET, FILM COATED ORAL at 11:35

## 2024-06-28 RX ADMIN — OXYCODONE HYDROCHLORIDE 5 MG: 5 TABLET ORAL at 15:09

## 2024-06-28 ASSESSMENT — PAIN DESCRIPTION - LOCATION: LOCATION: BUTTOCKS

## 2024-06-28 ASSESSMENT — PAIN - FUNCTIONAL ASSESSMENT: PAIN_FUNCTIONAL_ASSESSMENT: NONE - DENIES PAIN

## 2024-06-28 ASSESSMENT — PAIN SCALES - GENERAL: PAINLEVEL_OUTOF10: 7

## 2024-06-28 NOTE — DISCHARGE INSTRUCTIONS
Please resume your eliquis on 6/30/24    OK to shower but do not submerge under water for 4 weeks until incisions are fully closed.     No lifting > 10 pounds for 2 weeks.     ACTIVITY  As tolerated and as directed by your doctor.   Bathe or shower as directed by your doctor.     DIET  Clear liquids until no nausea or vomiting; then light diet for the first day.  Advance to regular diet on second day, unless your doctor orders otherwise.   If nausea and vomiting continues, call your doctor.     PAIN  Take pain medication as directed by your doctor.   Call your doctor if pain is NOT relieved by medication.   DO NOT take aspirin of blood thinners unless directed by your doctor.     MEDICATION INTERACTION:During your procedure you potentially received a medication or medications which may reduce the effectiveness of oral contraceptives. Please consider other forms of contraception for 1 month following your procedure if you are currently using oral contraceptives as your primary form of birth control. In addition to this, we recommend continuing your oral contraceptive as prescribed, unless otherwise instructed by your physician, during this time      CALL YOUR DOCTOR IF   Excessive bleeding that does not stop after holding pressure over the area  Temperature of 101 degrees F or above  Excessive redness, swelling or bruising, and/ or green or yellow, smelly discharge from incision    After general anesthesia or intravenous sedation, for 24 hours or while taking prescription Narcotics:  Limit your activities  A responsible adult needs to be with you for the next 24 hours  Do not drive and operate hazardous machinery  Do not make important personal or business decisions  Do not drink alcoholic beverages  If you have not urinated within 8 hours after discharge, and you are experiencing discomfort from urinary retention, please go to the nearest ED.  If you have sleep apnea and have a CPAP machine, please use it for all

## 2024-06-28 NOTE — ANESTHESIA POSTPROCEDURE EVALUATION
Department of Anesthesiology  Postprocedure Note    Patient: Veronica Kennedy  MRN: [id number]  YOB: 1950  Date of evaluation: 6/28/2024    Procedure Summary       Date: 06/28/24 Room / Location: [facility] MAIN OR  / [facility] MAIN OR    Anesthesia Start: 1240 Anesthesia Stop: 1434    Procedure: INTERSTIM REMOVAL AND REPLACEMENT (Back) Diagnosis:       Overactive bladder      Urge incontinence      (Overactive bladder [N32.81])      (Urge incontinence [N39.41])    Providers: Samuel Quiñones MD Responsible Provider: Ashlie Al MD    Anesthesia Type: TIVA ASA Status: 3            Anesthesia Type: TIVA    Lorenzo Phase I: Lorenzo Score: 8    Lorenzo Phase II: Lorenzo Score: 10    Anesthesia Post Evaluation    Patient location during evaluation: PACU  Patient participation: complete - patient participated  Level of consciousness: awake and alert  Airway patency: patent  Nausea: well controlled.  Cardiovascular status: acceptable.  Respiratory status: acceptable  Hydration status: stable  Pain management: adequate    No notable events documented.

## 2024-06-28 NOTE — OP NOTE
[facility] OPERATIVE REPORT     Name:  Veronica Kennedy  MR#:  [id number]  :  1950     DATE OF SERVICE: 2024     PREOPERATIVE DIAGNOSIS:  Urge urinary incontinence.     POSTOPERATIVE DIAGNOSIS:  Urge urinary incontinence.     PROCEDURE PERFORMED:  Removal of sacral nerve stimulator (interstim) and replacement of Stage I and 2 combined sacral nerve InterStim placement.     SURGEON:  Samuel Quiñones MD     ASSISTANT:  None.     ANESTHESIA:  MAC.     COMPLICATIONS:  None.     SPECIMENS REMOVED:  None.     IMPLANTS:  Removal of interstim (sacral nerve stimulator) and replacement of InterStim lead placement X 1 on L Side      ESTIMATED BLOOD LOSS:  1 mL.     FINDINGS:  L InterStim lead placement with good position verified on fluoroscopy and confirmed ana and large toe flexion on leads zero through 3 at 1.0 volts.     INDICATIONS FOR OPERATIVE PROCEDURE:  The patient is a 74 year old female with a history of urge urinary incontinence refractory to conservative therapy.  Underwent office interstim trial with > 50% improvement in symptoms.  Counseled on management options and decided to pursue stage I &2  InterStim in the operating room in  for treatment.  She did well but her interstim eventually stopped working and office KUB showed that the lead had migrated out of the foramen and the battery had flipped in the pocket.  Interstim removal / replacement recommended today.      DESCRIPTION OF OPERATIVE PROCEDURE:  After informed consent was obtained and the correct patient was identified in the preoperative holding area, the was taken back to the operating room suite and placed on the table in the supine position.  Time-out was performed confirming the correct patient and planned procedure.  The received IV vancomycin and gentamicin prior to smooth induction of MAC anesthesia.  The patient was then turned into the prone position and prepped and draped in the usual sterile  point, I then copiously irrigated the wounds with antibiotic irrigation and I proceeded to achieve hemostasis using electrocautery.  We synced the device prior to closure with the  and it demonstrated good function of all of the leads.  I then proceeded with my closure.  I closed the subcutaneous fat with a 2-0 PDS in an interrupted fashion.  I then closed the skin with a 4-0 Monocryl in a running subcuticular fashion.  I then applied Dermabond to both the central back wound as well as the buttock pocket wound and injected local anesthesia.  All wounds were closed in the same manner described above.  At this point, the patient was then awoken from anesthesia, transferred to the PACU in stable condition. The patient tolerated the procedure well.  There were no complications.  All counts were correct at the end of the procedure.  The InterStim rep then trialed and worked with the patient in the PACU in regards to the device.  The patient will follow up with me in 2 weeks for a wound check.     Samuel Quiñones M.D.     [facility]  [address] [address]  Phone: [phone]  Fax: [phone]

## 2024-06-28 NOTE — ANESTHESIA PRE PROCEDURE
Department of Anesthesiology  Preprocedure Note       Name:  Veronica Kennedy   Age:  74 y.o.  :  1950                                          MRN:  [id number]         Date:  2024      Surgeon: Surgeon(s):  Samuel Quiñones MD    Procedure: Procedure(s):  INTERSTIM REMOVAL AND REPLACEMENT    Medications prior to admission:   Prior to Admission medications    Medication Sig Start Date End Date Taking? Authorizing Provider   pravastatin (PRAVACHOL) 20 MG tablet Take 1 tablet by mouth at bedtime    Gabriel Amador MD   ibuprofen (ADVIL;MOTRIN) 200 MG tablet Take 1 tablet by mouth as needed for Pain    Gabriel Amador MD   acetaminophen (TYLENOL) 500 MG tablet Take 1 tablet by mouth as needed for Pain    Gabriel Amador MD   NONFORMULARY Take by mouth at bedtime ACV keto gummies- 2 gummies at bedtime    Gabriel Amador MD   furosemide (LASIX) 40 MG tablet Take 1 tablet by mouth daily as needed (Hypervolemia) Take a dose of Lasix for weight gain of 2 lbs in two days or 5 lbs in one week and return to as needed dosing once the weight returns to baseline 24   Luis Huerta MD   apixaban (ELIQUIS) 5 MG TABS tablet Take 1 tablet by mouth 2 times daily 24   Luis Huerta MD   traMADol (ULTRAM) 50 MG tablet TAKE 1 TABLET BY MOUTH ONCE DAILY AS NEEDED FOR SEVERE PAIN FOR 30 DAYS 10/31/23   ProviderGabriel MD   metoprolol succinate (TOPROL XL) 25 MG extended release tablet Take 1 tablet by mouth daily 23   Luis Huerta MD   vitamin D 25 MCG (1000 UT) CAPS Take by mouth daily    Automatic Reconciliation, Ar   sertraline (ZOLOFT) 50 MG tablet Take 1 tablet by mouth daily 8/10/21   Automatic Reconciliation, Ar       Current medications:    Current Facility-Administered Medications   Medication Dose Route Frequency Provider Last Rate Last Admin   • lidocaine 1 % injection 1 mL  1 mL IntraDERmal Once PRN Geena Varghese MD       • acetaminophen

## 2024-06-28 NOTE — H&P
[facility] H&P Note                                           06/28/24     Patient: Veronica Kennedy  MRN: [id number]    Admission Date:  6/28/2024, 0  Admission Diagnosis: Overactive bladder [N32.81]  Urge incontinence [N39.41]  Reason for Consult: UUI    ASSESSMENT: 74 y.o. 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  Lead has now migrated out of sacrum.  Presents for interstim revision today.      PLAN:  -To OR for interstim removal + replacement  -Consented  -Antibiotic on call to OR  -NPO for procedure.       __________________________________________________________________________________    HPI:     Veronica Kennedy is a 74 y.o. 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  Lead has now migrated out of sacrum.  Presents for interstim revision today.      No changes in medical history since last seen by me.  NPO.  Not on blood thinning medications.     Past Medical History:  Past Medical History:   Diagnosis Date    Acute cervical radiculopathy     Arthralgia of left shoulder region     Arthritis     fingers    Cervicalgia     Chest pain     pt denies CP or SOB    Chronic pain     right shoulder, sciatica right hip    Claustrophobia 2/21/2017    Coronary disease     Followed by [facility].    COVID-19 vaccine series completed     Pfizer vaccine completed X3 doses    Current use of long term anticoagulation     Eliquis    Depression     controlled with sertraline daily    Displacement of cervical intervertebral disc without myelopathy     Exogenous obesity     BMI 42    Extremity pain     Fibrocystic breast disease     Former cigarette smoker     H/O partial adrenalectomy (HCC)     right adrenalectomy (benign tumor)    Heart failure with preserved ejection fraction (HCC)     EF of 60 - 65%.    History of bone density study 2017    Dexa 2017:  Wnl.  10 year fracture risk (FRAX score):  Any Fracture:  7.9%  Hip fracture:  0.7%     use: No    Sexual activity: Not Currently     Partners: Male     Comment: hysterectomy   Other Topics Concern    Not on file   Social History Narrative    Pt is primary caregiver for  who just suffered another stroke; going home later this week.    IM:  Dr. Brian Sullivan  Derm:  Dr. Ovi Jr.     10/2018 Pt's  .      Social Determinants of Health     Financial Resource Strain: Not on file   Food Insecurity: Not on file   Transportation Needs: Not on file   Physical Activity: Not on file   Stress: Not on file   Social Connections: Not on file   Intimate Partner Violence: Not on file   Housing Stability: Not on file       Family History:  Family History   Problem Relation Age of Onset    Heart Disease Brother     Lung Disease Mother         copd    Arrhythmia Mother         A. Fib    Heart Disease Mother     Heart Disease Brother        ROS:  Review of Systems - General ROS: negative for - chills, fatigue, or fever    Vitals:  Vitals:    24 1128   BP:    Pulse: 64   Resp:    Temp:    SpO2:        Intake/Output:  No intake or output data in the 24 hours ending 24 1226     Physical Exam:   BP (!) 205/82   Pulse 64   Temp 97.4 °F (36.3 °C) (Oral)   Resp 18   Ht 1.702 m (5' 7\")   Wt 125.6 kg (276 lb 12.8 oz)   SpO2 98%   BMI 43.35 kg/m²      GENERAL: No acute distress, Awake, Alert, Oriented X 3,   CARDIAC: regular rate and rhythm  CHEST AND LUNG: Easy work of breathing,   ABDOMEN: soft, non tender, non-distended,     Lab/Radiology/Other Diagnostic Tests:  No results for input(s): \"HGB\", \"HCT\", \"WBC\", \"NA\", \"K\", \"CL\", \"CO2\", \"BUN\", \"CR\", \"GLU\", \"MG\", \"ALBUMIN\", \"AST\", \"ALT\", \"ALKPHOS\", \"LIPASE\", \"PREALB\", \"INR\" in the last 72 hours.    Invalid input(s): \"PLTCT\", \"CA\", \"PO4\", \"TOTPROT\", \"TOTBILI\", \"IVANA\", \"PT\", \"PTT\"      Samuel Quiñones M.D.    [facility] [place] [address]  Phone: [phone]

## 2024-06-28 NOTE — PERIOP NOTE
[employer] rep at bedside. Stimulator programmed and explained to patient. No further questions asked at this time.

## 2024-07-12 ENCOUNTER — OFFICE VISIT (OUTPATIENT)
Dept: UROLOGY | Age: 74
End: 2024-07-12
Payer: MEDICARE

## 2024-07-12 DIAGNOSIS — N39.41 URGE URINARY INCONTINENCE: Primary | ICD-10-CM

## 2024-07-12 DIAGNOSIS — N32.81 OVERACTIVE BLADDER: ICD-10-CM

## 2024-07-12 PROCEDURE — G8428 CUR MEDS NOT DOCUMENT: HCPCS | Performed by: NURSE PRACTITIONER

## 2024-07-12 PROCEDURE — 99213 OFFICE O/P EST LOW 20 MIN: CPT | Performed by: NURSE PRACTITIONER

## 2024-07-12 PROCEDURE — G8417 CALC BMI ABV UP PARAM F/U: HCPCS | Performed by: NURSE PRACTITIONER

## 2024-07-12 PROCEDURE — 1090F PRES/ABSN URINE INCON ASSESS: CPT | Performed by: NURSE PRACTITIONER

## 2024-07-12 PROCEDURE — 0509F URINE INCON PLAN DOCD: CPT | Performed by: NURSE PRACTITIONER

## 2024-07-12 PROCEDURE — G8399 PT W/DXA RESULTS DOCUMENT: HCPCS | Performed by: NURSE PRACTITIONER

## 2024-07-12 PROCEDURE — 1123F ACP DISCUSS/DSCN MKR DOCD: CPT | Performed by: NURSE PRACTITIONER

## 2024-07-12 PROCEDURE — 1036F TOBACCO NON-USER: CPT | Performed by: NURSE PRACTITIONER

## 2024-07-12 PROCEDURE — 3017F COLORECTAL CA SCREEN DOC REV: CPT | Performed by: NURSE PRACTITIONER

## 2024-07-12 ASSESSMENT — ENCOUNTER SYMPTOMS
BACK PAIN: 0
NAUSEA: 0

## 2024-07-12 NOTE — PROGRESS NOTES
[facility]  [address] [place] [address]  [phone]          Veronica Kennedy  : 1950    Chief Complaint   Patient presents with    Follow-up          HPI     Veronica Kennedy is a 74 y.o. female  Here today for InterStim removal and replacement follow-up.  Procedure was done 2 weeks ago.  Overall she is doing well.  Back today for wound check.  Overall she is doing well.  She has adjusted her programming since the initial insertion.  She does feel that the InterStim is still helping tremendously.    Currently she is on program 3 in the setting is 2.9.        Past Medical History:   Diagnosis Date    Acute cervical radiculopathy     Arthralgia of left shoulder region     Arthritis     fingers    Cervicalgia     Chest pain     pt denies CP or SOB    Chronic pain     right shoulder, sciatica right hip    Claustrophobia 2017    Coronary disease     Followed by [facility].    COVID-19 vaccine series completed     Pfizer vaccine completed X3 doses    Current use of long term anticoagulation     Eliquis    Depression     controlled with sertraline daily    Displacement of cervical intervertebral disc without myelopathy     Exogenous obesity     BMI 42    Extremity pain     Fibrocystic breast disease     Former cigarette smoker     H/O partial adrenalectomy (HCC)     right adrenalectomy (benign tumor)    Heart failure with preserved ejection fraction (HCC)     EF of 60 - 65%.    History of bone density study 2017    Dexa 2017:  Wnl.  10 year fracture risk (FRAX score):  Any Fracture:  7.9%  Hip fracture:  0.7%    History of colonoscopy 2017    Colonoscopy current (2017 Dr. Calvin --> diverticulitis and tortuous colon, R 10 y). 2018 f/u ([place] noncardiac CP, likely esophageal spasm).  Pt cxd EGD. [facility], Farideh Blount, APRN.  -->resched EGD w/ Dr. Calvin (see \"CC media\" 2020 \"Referral order/[facility]\" office note)      History of gastroesophageal reflux

## 2024-08-08 ENCOUNTER — TRANSCRIBE ORDERS (OUTPATIENT)
Facility: HOSPITAL | Age: 74
End: 2024-08-08

## 2024-08-08 ENCOUNTER — OFFICE VISIT (OUTPATIENT)
Dept: UROLOGY | Age: 74
End: 2024-08-08

## 2024-08-08 DIAGNOSIS — N32.81 OVERACTIVE BLADDER: ICD-10-CM

## 2024-08-08 DIAGNOSIS — Z12.31 SCREENING MAMMOGRAM FOR HIGH-RISK PATIENT: Primary | ICD-10-CM

## 2024-08-08 DIAGNOSIS — N39.41 URGE URINARY INCONTINENCE: Primary | ICD-10-CM

## 2024-08-08 PROCEDURE — 99024 POSTOP FOLLOW-UP VISIT: CPT | Performed by: UROLOGY

## 2024-08-08 NOTE — PROGRESS NOTES
[facility]  [address]   [address]  [place] [address]  [phone]    Veronica Kennedy  : 1950    CC: Hx of UUI/OAB s/p interstim revision 2024       HPI     Veronica Kennedy is a 74 y.o. female with a history of UUI/OAB s/p interstim revision and replacement on 2024 with noted improvement in symptoms. Here today for 4 week symptom check. Today, patient reports recoverying well from surgery but needs help with re-programming.     Pt previously had an interstim device placed in 2022. KUB obtained on 2024 indicated needed revision/replacement due to a rotated battery and migrated lead. This was completed on 2024.       Past Medical History:   Diagnosis Date    Acute cervical radiculopathy     Arthralgia of left shoulder region     Arthritis     fingers    Cervicalgia     Chest pain     pt denies CP or SOB    Chronic pain     right shoulder, sciatica right hip    Claustrophobia 2017    Coronary disease     Followed by [facility].    COVID-19 vaccine series completed     Pfizer vaccine completed X3 doses    Current use of long term anticoagulation     Eliquis    Depression     controlled with sertraline daily    Displacement of cervical intervertebral disc without myelopathy     Exogenous obesity     BMI 42    Extremity pain     Fibrocystic breast disease     Former cigarette smoker     H/O partial adrenalectomy (HCC)     right adrenalectomy (benign tumor)    Heart failure with preserved ejection fraction (HCC)     EF of 60 - 65%.    History of bone density study 2017    Dexa 2017:  Wnl.  10 year fracture risk (FRAX score):  Any Fracture:  7.9%  Hip fracture:  0.7%    History of colonoscopy 2017    Colonoscopy current (2017 Dr. Calvin --> diverticulitis and tortuous colon, R 10 y). 2018 f/u ([place] noncardiac CP, likely esophageal spasm).  Pt cxd EGD. [facility], Farideh Blount, APRN.  -->resched EGD w/ Dr. Calvin (see \"CC media\" 2020

## 2024-08-10 ASSESSMENT — ENCOUNTER SYMPTOMS
VOMITING: 0
NAUSEA: 0
INDIGESTION: 0
BACK PAIN: 0
CONSTIPATION: 0
ABDOMINAL PAIN: 0
EYE DISCHARGE: 0
COUGH: 0
DIARRHEA: 0
WHEEZING: 0
HEARTBURN: 0
BLOOD IN STOOL: 0
SKIN LESIONS: 0
EYE PAIN: 0

## 2024-09-11 DIAGNOSIS — N39.41 URGE URINARY INCONTINENCE: Primary | ICD-10-CM

## 2024-09-11 DIAGNOSIS — N39.0 URINARY TRACT INFECTION WITHOUT HEMATURIA, SITE UNSPECIFIED: ICD-10-CM

## 2024-09-11 LAB
APPEARANCE UR: ABNORMAL
BACTERIA URNS QL MICRO: ABNORMAL /HPF
BILIRUB UR QL: NEGATIVE
COLOR UR: ABNORMAL
EPI CELLS #/AREA URNS HPF: ABNORMAL /HPF (ref 0–5)
GLUCOSE UR STRIP.AUTO-MCNC: NEGATIVE MG/DL
HGB UR QL STRIP: ABNORMAL
HYALINE CASTS URNS QL MICRO: ABNORMAL /LPF
KETONES UR QL STRIP.AUTO: NEGATIVE MG/DL
LEUKOCYTE ESTERASE UR QL STRIP.AUTO: ABNORMAL
NITRITE UR QL STRIP.AUTO: NEGATIVE
PH UR STRIP: 5.5 (ref 5–9)
PROT UR STRIP-MCNC: 100 MG/DL
RBC #/AREA URNS HPF: ABNORMAL /HPF (ref 0–5)
SP GR UR REFRACTOMETRY: 1.03 (ref 1–1.02)
UROBILINOGEN UR QL STRIP.AUTO: 0.2 EU/DL (ref 0.2–1)
WBC URNS QL MICRO: >100 /HPF (ref 0–4)

## 2024-09-12 ENCOUNTER — TELEPHONE (OUTPATIENT)
Dept: UROLOGY | Age: 74
End: 2024-09-12

## 2024-09-13 ENCOUNTER — HOSPITAL ENCOUNTER (EMERGENCY)
Age: 74
Discharge: HOME | End: 2024-09-14
Attending: EMERGENCY MEDICINE
Payer: MEDICARE

## 2024-09-13 DIAGNOSIS — N10 ACUTE PYELONEPHRITIS: Primary | ICD-10-CM

## 2024-09-13 LAB
ANION GAP SERPL CALC-SCNC: 9 MMOL/L (ref 9–18)
APPEARANCE UR: ABNORMAL
BACTERIA SPEC CULT: NORMAL
BACTERIA SPEC CULT: NORMAL
BACTERIA URNS QL MICRO: ABNORMAL /HPF
BASOPHILS # BLD: 0.1 K/UL (ref 0–0.2)
BASOPHILS NFR BLD: 1 % (ref 0–2)
BILIRUB UR QL: NEGATIVE
BUN SERPL-MCNC: 26 MG/DL (ref 8–23)
CALCIUM SERPL-MCNC: 9.3 MG/DL (ref 8.3–10.4)
CHLORIDE SERPL-SCNC: 106 MMOL/L (ref 98–107)
CO2 SERPL-SCNC: 29 MMOL/L (ref 21–32)
COLOR UR: YELLOW
CREAT SERPL-MCNC: 0.88 MG/DL (ref 0.6–1)
DIFFERENTIAL METHOD BLD: ABNORMAL
EOSINOPHIL # BLD: 0.1 K/UL (ref 0–0.8)
EOSINOPHIL NFR BLD: 1 % (ref 0.5–7.8)
EPI CELLS #/AREA URNS HPF: ABNORMAL /HPF
ERYTHROCYTE [DISTWIDTH] IN BLOOD BY AUTOMATED COUNT: 12.8 % (ref 11.9–14.6)
GLUCOSE SERPL-MCNC: 123 MG/DL (ref 65–100)
GLUCOSE UR STRIP.AUTO-MCNC: NEGATIVE MG/DL
HCT VFR BLD AUTO: 43.8 % (ref 35.8–46.3)
HGB BLD-MCNC: 14.3 G/DL (ref 11.7–15.4)
HGB UR QL STRIP: ABNORMAL
IMM GRANULOCYTES # BLD AUTO: 0 K/UL (ref 0–0.5)
IMM GRANULOCYTES NFR BLD AUTO: 0 % (ref 0–5)
KETONES UR QL STRIP.AUTO: NEGATIVE MG/DL
LACTATE SERPL-SCNC: 1 MMOL/L (ref 0.5–2)
LEUKOCYTE ESTERASE UR QL STRIP.AUTO: ABNORMAL
LYMPHOCYTES # BLD: 1 K/UL (ref 0.5–4.6)
LYMPHOCYTES NFR BLD: 14 % (ref 13–44)
MCH RBC QN AUTO: 30.9 PG (ref 26.1–32.9)
MCHC RBC AUTO-ENTMCNC: 32.6 G/DL (ref 31.4–35)
MCV RBC AUTO: 94.6 FL (ref 82–102)
MONOCYTES # BLD: 0.4 K/UL (ref 0.1–1.3)
MONOCYTES NFR BLD: 6 % (ref 4–12)
MUCOUS THREADS URNS QL MICRO: 0 /LPF
NEUTS SEG # BLD: 5.4 K/UL (ref 1.7–8.2)
NEUTS SEG NFR BLD: 78 % (ref 43–78)
NITRITE UR QL STRIP.AUTO: POSITIVE
NRBC # BLD: 0 K/UL (ref 0–0.2)
OTHER OBSERVATIONS: ABNORMAL
PH UR STRIP: 6 (ref 5–9)
PLATELET # BLD AUTO: 141 K/UL (ref 150–450)
PMV BLD AUTO: 10.3 FL (ref 9.4–12.3)
POTASSIUM SERPL-SCNC: 4 MMOL/L (ref 3.5–5.1)
PROT UR STRIP-MCNC: >300 MG/DL
RBC # BLD AUTO: 4.63 M/UL (ref 4.05–5.2)
RBC #/AREA URNS HPF: ABNORMAL /HPF
SERVICE CMNT-IMP: NORMAL
SODIUM SERPL-SCNC: 144 MMOL/L (ref 133–143)
SP GR UR REFRACTOMETRY: >=1.03 (ref 1–1.02)
UROBILINOGEN UR QL STRIP.AUTO: 0.2 EU/DL (ref 0.2–1)
WBC # BLD AUTO: 6.9 K/UL (ref 4.3–11.1)
WBC URNS QL MICRO: ABNORMAL /HPF

## 2024-09-13 PROCEDURE — 2580000003 HC RX 258: Performed by: EMERGENCY MEDICINE

## 2024-09-13 PROCEDURE — 96361 HYDRATE IV INFUSION ADD-ON: CPT

## 2024-09-13 PROCEDURE — 6360000002 HC RX W HCPCS: Performed by: EMERGENCY MEDICINE

## 2024-09-13 PROCEDURE — 87186 SC STD MICRODIL/AGAR DIL: CPT

## 2024-09-13 PROCEDURE — 96374 THER/PROPH/DIAG INJ IV PUSH: CPT

## 2024-09-13 PROCEDURE — 80048 BASIC METABOLIC PNL TOTAL CA: CPT

## 2024-09-13 PROCEDURE — 87088 URINE BACTERIA CULTURE: CPT

## 2024-09-13 PROCEDURE — 85025 COMPLETE CBC W/AUTO DIFF WBC: CPT

## 2024-09-13 PROCEDURE — 99284 EMERGENCY DEPT VISIT MOD MDM: CPT

## 2024-09-13 PROCEDURE — 6370000000 HC RX 637 (ALT 250 FOR IP): Performed by: EMERGENCY MEDICINE

## 2024-09-13 PROCEDURE — 87086 URINE CULTURE/COLONY COUNT: CPT

## 2024-09-13 PROCEDURE — 81001 URINALYSIS AUTO W/SCOPE: CPT

## 2024-09-13 PROCEDURE — 83605 ASSAY OF LACTIC ACID: CPT

## 2024-09-13 RX ORDER — 0.9 % SODIUM CHLORIDE 0.9 %
1000 INTRAVENOUS SOLUTION INTRAVENOUS ONCE
Status: COMPLETED | OUTPATIENT
Start: 2024-09-13 | End: 2024-09-14

## 2024-09-13 RX ORDER — CEFDINIR 300 MG/1
300 CAPSULE ORAL 2 TIMES DAILY
Qty: 20 CAPSULE | Refills: 0 | Status: SHIPPED | OUTPATIENT
Start: 2024-09-13 | End: 2024-09-23

## 2024-09-13 RX ORDER — PHENAZOPYRIDINE HYDROCHLORIDE 100 MG/1
200 TABLET, FILM COATED ORAL 3 TIMES DAILY PRN
Qty: 6 TABLET | Refills: 0 | Status: SHIPPED | OUTPATIENT
Start: 2024-09-13 | End: 2024-09-16

## 2024-09-13 RX ORDER — PHENAZOPYRIDINE HYDROCHLORIDE 95 MG/1
200 TABLET ORAL
Status: COMPLETED | OUTPATIENT
Start: 2024-09-13 | End: 2024-09-13

## 2024-09-13 RX ADMIN — WATER 1000 MG: 1 INJECTION INTRAMUSCULAR; INTRAVENOUS; SUBCUTANEOUS at 23:26

## 2024-09-13 RX ADMIN — SODIUM CHLORIDE 1000 ML: 9 INJECTION, SOLUTION INTRAVENOUS at 23:25

## 2024-09-13 RX ADMIN — URINARY PAIN RELIEF 190 MG: 95 TABLET ORAL at 23:26

## 2024-09-13 ASSESSMENT — LIFESTYLE VARIABLES
HOW OFTEN DO YOU HAVE A DRINK CONTAINING ALCOHOL: NEVER
HOW MANY STANDARD DRINKS CONTAINING ALCOHOL DO YOU HAVE ON A TYPICAL DAY: PATIENT DOES NOT DRINK

## 2024-09-13 ASSESSMENT — PAIN SCALES - GENERAL
PAINLEVEL_OUTOF10: 7
PAINLEVEL_OUTOF10: 4

## 2024-09-13 ASSESSMENT — PAIN - FUNCTIONAL ASSESSMENT
PAIN_FUNCTIONAL_ASSESSMENT: 0-10
PAIN_FUNCTIONAL_ASSESSMENT: 0-10

## 2024-09-14 VITALS
HEIGHT: 67 IN | SYSTOLIC BLOOD PRESSURE: 138 MMHG | HEART RATE: 76 BPM | BODY MASS INDEX: 44.73 KG/M2 | RESPIRATION RATE: 16 BRPM | DIASTOLIC BLOOD PRESSURE: 88 MMHG | OXYGEN SATURATION: 98 % | WEIGHT: 285 LBS | TEMPERATURE: 98.8 F

## 2024-09-14 PROCEDURE — 96375 TX/PRO/DX INJ NEW DRUG ADDON: CPT

## 2024-09-14 PROCEDURE — 6360000002 HC RX W HCPCS: Performed by: EMERGENCY MEDICINE

## 2024-09-14 RX ORDER — ONDANSETRON 2 MG/ML
4 INJECTION INTRAMUSCULAR; INTRAVENOUS
Status: COMPLETED | OUTPATIENT
Start: 2024-09-14 | End: 2024-09-14

## 2024-09-14 RX ORDER — ONDANSETRON 8 MG/1
8 TABLET, ORALLY DISINTEGRATING ORAL EVERY 8 HOURS PRN
Qty: 12 TABLET | Refills: 2 | Status: SHIPPED | OUTPATIENT
Start: 2024-09-14

## 2024-09-14 RX ADMIN — ONDANSETRON 4 MG: 2 INJECTION INTRAMUSCULAR; INTRAVENOUS at 00:21

## 2024-09-14 ASSESSMENT — PAIN SCALES - GENERAL: PAINLEVEL_OUTOF10: 2

## 2024-09-14 ASSESSMENT — PAIN - FUNCTIONAL ASSESSMENT: PAIN_FUNCTIONAL_ASSESSMENT: 0-10

## 2024-09-15 LAB
BACTERIA SPEC CULT: ABNORMAL
SERVICE CMNT-IMP: ABNORMAL

## 2024-09-16 LAB
BACTERIA SPEC CULT: ABNORMAL
SERVICE CMNT-IMP: ABNORMAL

## 2024-09-20 ASSESSMENT — ENCOUNTER SYMPTOMS
BACK PAIN: 0
RHINORRHEA: 0
VOMITING: 0
EYE REDNESS: 0
SHORTNESS OF BREATH: 0
NAUSEA: 1
FACIAL SWELLING: 0
EYE DISCHARGE: 0
ABDOMINAL PAIN: 1
COLOR CHANGE: 0
COUGH: 0

## 2024-10-26 NOTE — PROGRESS NOTES
[facility] CARDIOLOGY Follow Up                 Reason for Visit: Chronic HFpEF    Subjective:     Patient is a 74 y.o. female with a PMH of chronic HFpEF, atrial fibrillation status post ablation, atrial flutter status post ablation, hyperlipidemia, and coronary artery calcification noted on CAT scan who presents for follow-up.  The patient was last seen in April 2024.  Medical records were requested of the lipid panel.  She was noted to have an LDL of 74 in January 2024.  She had a TTE in February 2023 that was noted to demonstrate a normal EF.  The patient denies angina and dyspnea.    Past Medical History:   Diagnosis Date    Acute cervical radiculopathy     Arthralgia of left shoulder region     Arthritis     fingers    Cervicalgia     Chest pain     pt denies CP or SOB    Chronic pain     right shoulder, sciatica right hip    Claustrophobia 2/21/2017    Coronary disease     Followed by [facility].    COVID-19 vaccine series completed     Pfizer vaccine completed X3 doses    Current use of long term anticoagulation     Eliquis    Depression     controlled with sertraline daily    Displacement of cervical intervertebral disc without myelopathy     Exogenous obesity     BMI 42    Extremity pain     Fibrocystic breast disease     Former cigarette smoker     H/O partial adrenalectomy (HCC)     right adrenalectomy (benign tumor)    Heart failure with preserved ejection fraction (HCC)     EF of 60 - 65%.    History of bone density study 2017    Dexa 2017:  Wnl.  10 year fracture risk (FRAX score):  Any Fracture:  7.9%  Hip fracture:  0.7%    History of colonoscopy 2017    Colonoscopy current (2017 Dr. Calvin --> diverticulitis and tortuous colon, R 10 y). 2018 f/u ([place] noncardiac CP, likely esophageal spasm).  Pt cxd EGD. 11/[facility], Farideh Blount, APRN.  -->resched EGD w/ Dr. Calvin (see \"CC media\" 2/7/2020 \"Referral order/[facility]\" office note)      History of gastroesophageal reflux (GERD)     no longer

## 2024-10-28 ENCOUNTER — OFFICE VISIT (OUTPATIENT)
Age: 74
End: 2024-10-28
Payer: MEDICARE

## 2024-10-28 VITALS
HEIGHT: 67 IN | OXYGEN SATURATION: 100 % | SYSTOLIC BLOOD PRESSURE: 130 MMHG | WEIGHT: 284 LBS | BODY MASS INDEX: 44.57 KG/M2 | HEART RATE: 59 BPM | DIASTOLIC BLOOD PRESSURE: 70 MMHG

## 2024-10-28 DIAGNOSIS — Z86.79 STATUS POST ABLATION OF ATRIAL FIBRILLATION: ICD-10-CM

## 2024-10-28 DIAGNOSIS — Z98.890 STATUS POST ABLATION OF ATRIAL FLUTTER: ICD-10-CM

## 2024-10-28 DIAGNOSIS — Z86.79 STATUS POST ABLATION OF ATRIAL FLUTTER: ICD-10-CM

## 2024-10-28 DIAGNOSIS — I50.32 CHRONIC HEART FAILURE WITH PRESERVED EJECTION FRACTION (HFPEF) (HCC): Primary | ICD-10-CM

## 2024-10-28 DIAGNOSIS — Z98.890 STATUS POST ABLATION OF ATRIAL FIBRILLATION: ICD-10-CM

## 2024-10-28 DIAGNOSIS — I25.10 CORONARY ARTERY CALCIFICATION SEEN ON CAT SCAN: ICD-10-CM

## 2024-10-28 PROCEDURE — 1123F ACP DISCUSS/DSCN MKR DOCD: CPT | Performed by: INTERNAL MEDICINE

## 2024-10-28 PROCEDURE — 99214 OFFICE O/P EST MOD 30 MIN: CPT | Performed by: INTERNAL MEDICINE

## 2024-10-28 PROCEDURE — 1090F PRES/ABSN URINE INCON ASSESS: CPT | Performed by: INTERNAL MEDICINE

## 2024-10-28 PROCEDURE — G8484 FLU IMMUNIZE NO ADMIN: HCPCS | Performed by: INTERNAL MEDICINE

## 2024-10-28 PROCEDURE — G8428 CUR MEDS NOT DOCUMENT: HCPCS | Performed by: INTERNAL MEDICINE

## 2024-10-28 PROCEDURE — 1126F AMNT PAIN NOTED NONE PRSNT: CPT | Performed by: INTERNAL MEDICINE

## 2024-10-28 PROCEDURE — 3017F COLORECTAL CA SCREEN DOC REV: CPT | Performed by: INTERNAL MEDICINE

## 2024-10-28 PROCEDURE — 1036F TOBACCO NON-USER: CPT | Performed by: INTERNAL MEDICINE

## 2024-10-28 PROCEDURE — G8417 CALC BMI ABV UP PARAM F/U: HCPCS | Performed by: INTERNAL MEDICINE

## 2024-10-28 PROCEDURE — G8399 PT W/DXA RESULTS DOCUMENT: HCPCS | Performed by: INTERNAL MEDICINE

## 2024-10-28 RX ORDER — PRAVASTATIN SODIUM 20 MG
20 TABLET ORAL NIGHTLY
Qty: 90 TABLET | Refills: 3 | Status: SHIPPED | OUTPATIENT
Start: 2024-10-28

## 2024-10-28 RX ORDER — OMEPRAZOLE 40 MG/1
40 CAPSULE, DELAYED RELEASE ORAL DAILY
COMMUNITY

## 2024-11-01 ENCOUNTER — HOSPITAL ENCOUNTER (OUTPATIENT)
Dept: MAMMOGRAPHY | Age: 74
Discharge: HOME | End: 2024-11-04
Payer: MEDICARE

## 2024-11-01 DIAGNOSIS — Z12.31 ENCOUNTER FOR SCREENING MAMMOGRAM FOR HIGH-RISK PATIENT: ICD-10-CM

## 2024-11-01 PROCEDURE — 77063 BREAST TOMOSYNTHESIS BI: CPT

## 2024-11-14 ENCOUNTER — APPOINTMENT (RX ONLY)
Dept: URBAN - METROPOLITAN AREA CLINIC 330 | Facility: CLINIC | Age: 74
Setting detail: DERMATOLOGY
End: 2024-11-14

## 2024-11-14 DIAGNOSIS — L57.0 ACTINIC KERATOSIS: ICD-10-CM

## 2024-11-14 DIAGNOSIS — Z85.828 PERSONAL HISTORY OF OTHER MALIGNANT NEOPLASM OF SKIN: ICD-10-CM

## 2024-11-14 PROBLEM — D48.5 NEOPLASM OF UNCERTAIN BEHAVIOR OF SKIN: Status: ACTIVE | Noted: 2024-11-14

## 2024-11-14 PROCEDURE — 17003 DESTRUCT PREMALG LES 2-14: CPT

## 2024-11-14 PROCEDURE — ? OTHER

## 2024-11-14 PROCEDURE — 17000 DESTRUCT PREMALG LESION: CPT | Mod: 59

## 2024-11-14 PROCEDURE — 11102 TANGNTL BX SKIN SINGLE LES: CPT

## 2024-11-14 PROCEDURE — ? BIOPSY BY SHAVE METHOD

## 2024-11-14 PROCEDURE — ? LIQUID NITROGEN

## 2024-11-14 PROCEDURE — ? COUNSELING

## 2024-11-14 ASSESSMENT — LOCATION ZONE DERM
LOCATION ZONE: FACE
LOCATION ZONE: HAND

## 2024-11-14 ASSESSMENT — LOCATION DETAILED DESCRIPTION DERM
LOCATION DETAILED: LEFT RADIAL DORSAL HAND
LOCATION DETAILED: SUPERIOR MID FOREHEAD
LOCATION DETAILED: RIGHT INFERIOR TEMPLE

## 2024-11-14 ASSESSMENT — LOCATION SIMPLE DESCRIPTION DERM
LOCATION SIMPLE: SUPERIOR FOREHEAD
LOCATION SIMPLE: RIGHT TEMPLE
LOCATION SIMPLE: LEFT HAND

## 2024-11-14 NOTE — PROCEDURE: MIPS QUALITY
Quality 402: Tobacco Use And Help With Quitting Among Adolescents: Patient screened for tobacco and never smoked
Quality 431: Preventive Care And Screening: Unhealthy Alcohol Use - Screening: Patient not identified as an unhealthy alcohol user when screened for unhealthy alcohol use using a systematic screening method
Quality 110: Preventive Care And Screening: Influenza Immunization: Influenza Immunization Administered during Influenza season
Quality 226: Preventive Care And Screening: Tobacco Use: Screening And Cessation Intervention: Patient screened for tobacco use and is an ex/non-smoker
Quality 130: Documentation Of Current Medications In The Medical Record: Current Medications Documented
Quality 47: Advance Care Plan: Advance care planning not documented, reason not otherwise specified.
Detail Level: Detailed

## 2024-11-14 NOTE — PROCEDURE: LIQUID NITROGEN
Consent: The patient's consent was obtained including but not limited to risks of crusting, scabbing, blistering, scarring, darker or lighter pigmentary change, recurrence, incomplete removal and infection.
Render Post-Care Instructions In Note?: no
Post-Care Instructions: I reviewed with the patient in detail post-care instructions. Patient is to wear sunprotection, and avoid picking at any of the treated lesions. Pt may apply Vaseline to crusted or scabbing areas.
Duration Of Freeze Thaw-Cycle (Seconds): 0
Show Aperture Variable?: Yes
Detail Level: Simple

## 2024-11-25 ENCOUNTER — APPOINTMENT (RX ONLY)
Dept: URBAN - METROPOLITAN AREA CLINIC 330 | Facility: CLINIC | Age: 74
Setting detail: DERMATOLOGY
End: 2024-11-25

## 2024-11-25 PROBLEM — C44.92 SQUAMOUS CELL CARCINOMA OF SKIN, UNSPECIFIED: Status: ACTIVE | Noted: 2024-11-25

## 2024-11-25 PROCEDURE — ? REFERRAL

## 2024-12-18 ENCOUNTER — TELEPHONE (OUTPATIENT)
Age: 74
End: 2024-12-18

## 2024-12-18 NOTE — TELEPHONE ENCOUNTER
Luis Huerta MD  You2 hours ago (12:52 PM)       Since IV sedation will be used, she will need to be seen in clinic.

## 2024-12-18 NOTE — TELEPHONE ENCOUNTER
Cardiac Clearance        Physician or Practice Requesting:[facility]   : Robert   Contact Phone Number: [phone]  Fax Number: [phone]  Date of Surgery/Procedure: TBD  Type of Surgery or Procedure: Extraction of Multiple teeth  Type of Anesthesia: IV sedation   Type of Clearance Requested: risk assessment and any medication hold   Medication to Hold:eliquis   Days to Hold: 2 to 3 day hold

## 2024-12-18 NOTE — TELEPHONE ENCOUNTER
Cardiac Clearance           Physician or Practice Requesting:[facility]   : Robert   Contact Phone Number: [phone]  Fax Number: [phone]  Date of Surgery/Procedure: TBD  Type of Surgery or Procedure: Extraction of Multiple teeth  Type of Anesthesia: IV sedation   Type of Clearance Requested: risk assessment and any medication hold   Medication to Hold:eliquis   Days to Hold: 2 to 3 day hold      Pt.last seen 10/28/24 Per that assessment/plan  Chronic heart failure with preserved ejection fraction (HFpEF) ([facility])  - H2FPEF score = 8 diagnostic of HFpEF (heavy, atrial fibrillation, pulmonary hypertension, elder, filling pressure)   - Continue with p.o. Lasix as needed     2. Status post ablation of atrial fibrillation  3. Status post ablation of atrial flutter  - [id number] equals 4  - Continue with Eliquis and Toprol-XL     4. Coronary artery calcification seen on CAT scan   Continue with pravastatin

## 2024-12-19 ENCOUNTER — APPOINTMENT (OUTPATIENT)
Dept: URBAN - METROPOLITAN AREA CLINIC 330 | Facility: CLINIC | Age: 74
Setting detail: DERMATOLOGY
End: 2024-12-19

## 2024-12-19 DIAGNOSIS — L24 IRRITANT CONTACT DERMATITIS: ICD-10-CM

## 2024-12-19 PROBLEM — L24.9 IRRITANT CONTACT DERMATITIS, UNSPECIFIED CAUSE: Status: ACTIVE | Noted: 2024-12-19

## 2024-12-19 PROCEDURE — 99213 OFFICE O/P EST LOW 20 MIN: CPT

## 2024-12-19 PROCEDURE — ? COUNSELING

## 2024-12-19 PROCEDURE — ? ADDITIONAL NOTES

## 2024-12-19 ASSESSMENT — LOCATION DETAILED DESCRIPTION DERM: LOCATION DETAILED: RIGHT INFERIOR VERMILION LIP

## 2024-12-19 ASSESSMENT — SEVERITY ASSESSMENT 2020: SEVERITY 2020: MILD

## 2024-12-19 ASSESSMENT — LOCATION ZONE DERM: LOCATION ZONE: LIP

## 2024-12-19 ASSESSMENT — ITCH NUMERIC RATING SCALE: HOW SEVERE IS YOUR ITCHING?: 2

## 2024-12-19 ASSESSMENT — PAIN INTENSITY VAS: HOW INTENSE IS YOUR PAIN 0 BEING NO PAIN, 10 BEING THE MOST SEVERE PAIN POSSIBLE?: NO PAIN

## 2024-12-19 ASSESSMENT — LOCATION SIMPLE DESCRIPTION DERM: LOCATION SIMPLE: RIGHT LIP

## 2024-12-19 NOTE — PROCEDURE: MIPS QUALITY
Quality 431: Preventive Care And Screening: Unhealthy Alcohol Use - Screening: Patient not identified as an unhealthy alcohol user when screened for unhealthy alcohol use using a systematic screening method
Detail Level: Detailed
Quality 130: Documentation Of Current Medications In The Medical Record: Current Medications Documented
Quality 110: Preventive Care And Screening: Influenza Immunization: Influenza Immunization Administered during Influenza season
Quality 47: Advance Care Plan: Advance care planning not documented, reason not otherwise specified.
Quality 226: Preventive Care And Screening: Tobacco Use: Screening And Cessation Intervention: Patient screened for tobacco use and is an ex/non-smoker
Quality 402: Tobacco Use And Help With Quitting Among Adolescents: Patient screened for tobacco and never smoked

## 2024-12-19 NOTE — PROCEDURE: ADDITIONAL NOTES
Render Risk Assessment In Note?: no
Detail Level: Simple
Additional Notes: Gave her samples of Aquaphor today no visible changes consistent with skin cancer seen today.

## 2024-12-20 NOTE — TELEPHONE ENCOUNTER
I notified [facility] of MD response.I called pt.notified her and made appt.1/7/25.

## 2025-01-11 NOTE — PROGRESS NOTES
12/30/2021     No components found for: \"LDLCHOLESTEROL\", \"LDLCALC\"  Lab Results   Component Value Date    VLDL 18 12/30/2021     No results found for: \"CHOLHDLRATIO\"     Lab Results   Component Value Date/Time     09/13/2024 11:21 PM     06/11/2024 10:27 AM     09/11/2023 08:55 PM    K 4.0 09/13/2024 11:21 PM    K 4.3 06/11/2024 10:27 AM    K 4.4 09/11/2023 08:55 PM     09/13/2024 11:21 PM     06/11/2024 10:27 AM     09/11/2023 08:55 PM    CO2 29 09/13/2024 11:21 PM    CO2 25 06/11/2024 10:27 AM    CO2 28 09/11/2023 08:55 PM    BUN 26 09/13/2024 11:21 PM    BUN 20 06/11/2024 10:27 AM    BUN 14 09/11/2023 08:55 PM    CREATININE 0.88 09/13/2024 11:21 PM    CREATININE 0.72 06/11/2024 10:27 AM    CREATININE 0.64 09/11/2023 08:55 PM    GLUCOSE 123 09/13/2024 11:21 PM    GLUCOSE 113 06/11/2024 10:27 AM    GLUCOSE 117 09/11/2023 08:55 PM    CALCIUM 9.3 09/13/2024 11:21 PM    CALCIUM 9.1 06/11/2024 10:27 AM    CALCIUM 9.3 09/11/2023 08:55 PM         Lab Results   Component Value Date    ALT <5 (L) 09/11/2023    ALT 8 (L) 10/06/2022    ALT 18 12/30/2021    AST 12 (L) 09/11/2023    AST 12 (L) 10/06/2022    AST 13 12/30/2021        Assessment/Plan:   1. Chronic heart failure with preserved ejection fraction (HFpEF) ([facility])  - H2FPEF score = 8 diagnostic of HFpEF (heavy, atrial fibrillation, pulmonary hypertension, elder, filling pressure)   - Continue with p.o. Lasix as needed    2. Status post ablation of atrial fibrillation  3. Status post ablation of atrial flutter  - [id number] equals 4  - Continue with Eliquis and Toprol-XL    4. Coronary artery calcification seen on CAT scan  5. Hyperlipidemia, unspecified hyperlipidemia type  - Continue with pravastatin     6. Preoperative cardiovascular examination  - The patient is not having ACS, unstable cardiac arrhythmia, or decompensated HF  - RCRI equals 1: 0.9% rate of MACE  - According to the ACC guidelines, if the estimated perioperative

## 2025-01-13 ENCOUNTER — OFFICE VISIT (OUTPATIENT)
Age: 75
End: 2025-01-13
Payer: MEDICARE

## 2025-01-13 VITALS
BODY MASS INDEX: 44.2 KG/M2 | DIASTOLIC BLOOD PRESSURE: 72 MMHG | HEIGHT: 67 IN | SYSTOLIC BLOOD PRESSURE: 124 MMHG | WEIGHT: 281.6 LBS | HEART RATE: 72 BPM

## 2025-01-13 DIAGNOSIS — Z98.890 STATUS POST ABLATION OF ATRIAL FIBRILLATION: ICD-10-CM

## 2025-01-13 DIAGNOSIS — E78.5 HYPERLIPIDEMIA, UNSPECIFIED HYPERLIPIDEMIA TYPE: ICD-10-CM

## 2025-01-13 DIAGNOSIS — Z01.810 PREOPERATIVE CARDIOVASCULAR EXAMINATION: ICD-10-CM

## 2025-01-13 DIAGNOSIS — Z86.79 STATUS POST ABLATION OF ATRIAL FLUTTER: ICD-10-CM

## 2025-01-13 DIAGNOSIS — Z86.79 STATUS POST ABLATION OF ATRIAL FIBRILLATION: ICD-10-CM

## 2025-01-13 DIAGNOSIS — I50.32 CHRONIC HEART FAILURE WITH PRESERVED EJECTION FRACTION (HFPEF) (HCC): Primary | ICD-10-CM

## 2025-01-13 DIAGNOSIS — I25.10 CORONARY ARTERY CALCIFICATION SEEN ON CAT SCAN: ICD-10-CM

## 2025-01-13 DIAGNOSIS — Z98.890 STATUS POST ABLATION OF ATRIAL FLUTTER: ICD-10-CM

## 2025-01-13 PROCEDURE — 99214 OFFICE O/P EST MOD 30 MIN: CPT | Performed by: INTERNAL MEDICINE

## 2025-01-13 PROCEDURE — 1159F MED LIST DOCD IN RCRD: CPT | Performed by: INTERNAL MEDICINE

## 2025-01-13 PROCEDURE — 1090F PRES/ABSN URINE INCON ASSESS: CPT | Performed by: INTERNAL MEDICINE

## 2025-01-13 PROCEDURE — G8417 CALC BMI ABV UP PARAM F/U: HCPCS | Performed by: INTERNAL MEDICINE

## 2025-01-13 PROCEDURE — G8427 DOCREV CUR MEDS BY ELIG CLIN: HCPCS | Performed by: INTERNAL MEDICINE

## 2025-01-13 PROCEDURE — 1126F AMNT PAIN NOTED NONE PRSNT: CPT | Performed by: INTERNAL MEDICINE

## 2025-01-13 PROCEDURE — 3017F COLORECTAL CA SCREEN DOC REV: CPT | Performed by: INTERNAL MEDICINE

## 2025-01-13 PROCEDURE — 1123F ACP DISCUSS/DSCN MKR DOCD: CPT | Performed by: INTERNAL MEDICINE

## 2025-01-13 PROCEDURE — G8399 PT W/DXA RESULTS DOCUMENT: HCPCS | Performed by: INTERNAL MEDICINE

## 2025-01-13 PROCEDURE — 1036F TOBACCO NON-USER: CPT | Performed by: INTERNAL MEDICINE

## 2025-01-17 ENCOUNTER — TELEPHONE (OUTPATIENT)
Age: 75
End: 2025-01-17

## 2025-01-17 NOTE — TELEPHONE ENCOUNTER
FAX'd Dr Huerta's 1/13/25 office visit note giving cardiac clearance to Robert with Dr. Bradley Campbell at [phone].

## 2025-01-17 NOTE — TELEPHONE ENCOUNTER
Cardiac Clearance        Physician or Practice Requesting: Dr Bradley Campbell  : Souravashley  Contact Phone Number: [phone]  Fax Number: [phone]  Date of Surgery/Procedure: ASAP  Type of Surgery or Procedure: Tooth Extractions  Type of Anesthesia:  IV Sedation w/Local  Type of Clearance Requested: Cardiac Clearance and Medication Hold  Medication to Hold: Eliquis   Days to Hold:  2 - 3 days prior      See Chart Note 12/18  Patient seen by Dr Huerta on 1/13

## 2025-02-12 PROBLEM — Z01.810 PREOPERATIVE CARDIOVASCULAR EXAMINATION: Status: RESOLVED | Noted: 2025-01-13 | Resolved: 2025-02-12

## 2025-02-25 ENCOUNTER — APPOINTMENT (OUTPATIENT)
Dept: URBAN - METROPOLITAN AREA CLINIC 330 | Facility: CLINIC | Age: 75
Setting detail: DERMATOLOGY
End: 2025-02-25

## 2025-02-25 PROBLEM — C44.622 SQUAMOUS CELL CARCINOMA OF SKIN OF RIGHT UPPER LIMB, INCLUDING SHOULDER: Status: ACTIVE | Noted: 2025-02-25

## 2025-02-25 PROCEDURE — 12042 INTMD RPR N-HF/GENIT2.6-7.5: CPT

## 2025-02-25 PROCEDURE — ? PRESCRIPTION

## 2025-02-25 PROCEDURE — ? MOHS SURGERY

## 2025-02-25 PROCEDURE — 17311 MOHS 1 STAGE H/N/HF/G: CPT

## 2025-02-25 RX ORDER — MUPIROCIN 20 MG/G
OINTMENT TOPICAL BID
Qty: 22 | Refills: 1 | Status: ERX | COMMUNITY
Start: 2025-02-25

## 2025-02-25 RX ADMIN — MUPIROCIN: 20 OINTMENT TOPICAL at 00:00

## 2025-02-25 NOTE — PROCEDURE: MOHS SURGERY
Mohs Case Number: [id number]
Date Of Previous Biopsy (Optional): 11/14/24
Previous Accession (Optional): [id number]
Biopsy Photograph Reviewed: Yes
Consent Type: Consent 1 (Standard)
Eye Shield Used: No
Mauc Override (Will Disregard Factors Selected In Indications Tab): 8 (Appropriate)
Initial Size Of Lesion: 0.7
Number Of Stages: 1
Primary Defect Length In Cm (Final Defect Size - Required For Flaps/Grafts): 1.3
Primary Defect Depth In Cm (Optional But Required For Some Insurers): 0
Repair Type: Intermediate Layered Repair
Which Instrument Did You Use For Dermabrasion?: Wire Brush
Which Eyelid Repair Cpt Are You Using?: 03282
Oculoplastic Surgeon Procedure Text (A): After obtaining clear surgical margins the patient was sent to oculoplastics for surgical repair.  The patient understands they will receive post-surgical care and follow-up from the referring physician's office.
Otolaryngologist Procedure Text (A): After obtaining clear surgical margins the patient was sent to otolaryngology for surgical repair.  The patient understands they will receive post-surgical care and follow-up from the referring physician's office.
Plastic Surgeon Procedure Text (A): After obtaining clear surgical margins the patient was sent to plastics for surgical repair.  The patient understands they will receive post-surgical care and follow-up from the referring physician's office.
Mid-Level Procedure Text (A): After obtaining clear surgical margins the patient was sent to a mid-level provider for surgical repair.  The patient understands they will receive post-surgical care and follow-up from the mid-level provider.
Provider Procedure Text (A): After obtaining clear surgical margins the defect was repaired by another provider.
Asc Procedure Text (A): After obtaining clear surgical margins the patient was sent to an ASC for surgical repair.  The patient understands they will receive post-surgical care and follow-up from the ASC physician.
Simple / Intermediate / Complex Repair - Final Wound Length In Cm: 4
Deep Sutures: 3-0 Monocryl
Epidermal Sutures: 3-0 Prolene
Suture Removal: 14 days
Suturegard Retention Suture: 2-0 Nylon
Retention Suture Bite Size: 3 mm
Length To Time In Minutes Device Was In Place: 10
Undermining Type: Entire Wound
Debridement Text: The wound edges were debrided prior to proceeding with the closure to facilitate wound healing.
Helical Rim Text: The closure involved the helical rim.
Vermilion Border Text: The closure involved the vermilion border.
Nostril Rim Text: The closure involved the nostril rim.
Retention Suture Text: Retention sutures were placed to support the closure and prevent dehiscence.
Location Indication Override (Is Already Calculated Based On Selected Body Location): Area L
Area H Indication Text: Tumors in this location are included in Area H (eyelids, eyebrows, nose, lips, chin, ear, pre-auricular, post-auricular, temple, genitalia, hands, feet, ankles and areola).  Tissue conservation is critical in these anatomic locations.
Area M Indication Text: Tumors in this location are included in Area M (cheek, forehead, scalp, neck, jawline and pretibial skin).  Mohs surgery is indicated for tumors in these anatomic locations.
Area L Indication Text: Tumors in this location are included in Area L (trunk and extremities).  Mohs surgery is indicated for larger tumors, or tumors with aggressive histologic features, in these anatomic locations.
Tumor Debulked?: dermablade
Depth Of Tumor Invasion (For Histology): tumor not visualized
Perineural Invasion (For Histology - Be Specific If Possible): absent
Special Stains Stage 1 - Results: Base On Clearance Noted Above
Stage 2: Additional Anesthesia Type: 1% lidocaine with epinephrine
Staging Info: By selecting yes to the question above you will include information on AJCC 8 tumor staging in your Mohs note. Information on tumor staging will be automatically added for SCCs on the head and neck. AJCC 8 includes tumor size, tumor depth, perineural involvement and bone invasion.
Tumor Depth: Less than 6mm from granular layer and no invasion beyond the subcutaneous fat
Was The Patient On Physician Recommended Anticoagulation Therapy?: Please Select the Appropriate Response
Medical Necessity Statement: Based on my medical judgement, Mohs surgery is the most appropriate treatment for this cancer compared to other treatments.
Alternatives Discussed Intro (Do Not Add Period): I discussed alternative treatments to Mohs surgery and specifically discussed the risks and benefits of
Consent 1/Introductory Paragraph: The rationale for Mohs was explained to the patient and consent was obtained. The risks, benefits and alternatives to therapy were discussed in detail. Specifically, the risks of infection, scarring, bleeding, prolonged wound healing, incomplete removal, allergy to anesthesia, nerve injury and recurrence were addressed. Prior to the procedure, the treatment site was clearly identified and confirmed by the patient. All components of Universal Protocol/PAUSE Rule completed.
Consent 2/Introductory Paragraph: Mohs surgery was explained to the patient and consent was obtained. The risks, benefits and alternatives to therapy were discussed in detail. Specifically, the risks of infection, scarring, bleeding, prolonged wound healing, incomplete removal, allergy to anesthesia, nerve injury and recurrence were addressed. Prior to the procedure, the treatment site was clearly identified and confirmed by the patient. All components of Universal Protocol/PAUSE Rule completed.
Consent 3/Introductory Paragraph: I gave the patient a chance to ask questions they had about the procedure.  Following this I explained the Mohs procedure and consent was obtained. The risks, benefits and alternatives to therapy were discussed in detail. Specifically, the risks of infection, scarring, bleeding, prolonged wound healing, incomplete removal, allergy to anesthesia, nerve injury and recurrence were addressed. Prior to the procedure, the treatment site was clearly identified and confirmed by the patient. All components of Universal Protocol/PAUSE Rule completed.
Consent (Temporal Branch)/Introductory Paragraph: The rationale for Mohs was explained to the patient and consent was obtained. The risks, benefits and alternatives to therapy were discussed in detail. Specifically, the risks of damage to the temporal branch of the facial nerve, infection, scarring, bleeding, prolonged wound healing, incomplete removal, allergy to anesthesia, and recurrence were addressed. Prior to the procedure, the treatment site was clearly identified and confirmed by the patient. All components of Universal Protocol/PAUSE Rule completed.
Consent (Marginal Mandibular)/Introductory Paragraph: The rationale for Mohs was explained to the patient and consent was obtained. The risks, benefits and alternatives to therapy were discussed in detail. Specifically, the risks of damage to the marginal mandibular branch of the facial nerve, infection, scarring, bleeding, prolonged wound healing, incomplete removal, allergy to anesthesia, and recurrence were addressed. Prior to the procedure, the treatment site was clearly identified and confirmed by the patient. All components of Universal Protocol/PAUSE Rule completed.
Consent (Spinal Accessory)/Introductory Paragraph: The rationale for Mohs was explained to the patient and consent was obtained. The risks, benefits and alternatives to therapy were discussed in detail. Specifically, the risks of damage to the spinal accessory nerve, infection, scarring, bleeding, prolonged wound healing, incomplete removal, allergy to anesthesia, and recurrence were addressed. Prior to the procedure, the treatment site was clearly identified and confirmed by the patient. All components of Universal Protocol/PAUSE Rule completed.
Consent (Near Eyelid Margin)/Introductory Paragraph: The rationale for Mohs was explained to the patient and consent was obtained. The risks, benefits and alternatives to therapy were discussed in detail. Specifically, the risks of ectropion or eyelid deformity, infection, scarring, bleeding, prolonged wound healing, incomplete removal, allergy to anesthesia, nerve injury and recurrence were addressed. Prior to the procedure, the treatment site was clearly identified and confirmed by the patient. All components of Universal Protocol/PAUSE Rule completed.
Consent (Ear)/Introductory Paragraph: The rationale for Mohs was explained to the patient and consent was obtained. The risks, benefits and alternatives to therapy were discussed in detail. Specifically, the risks of ear deformity, infection, scarring, bleeding, prolonged wound healing, incomplete removal, allergy to anesthesia, nerve injury and recurrence were addressed. Prior to the procedure, the treatment site was clearly identified and confirmed by the patient. All components of Universal Protocol/PAUSE Rule completed.
Consent (Nose)/Introductory Paragraph: The rationale for Mohs was explained to the patient and consent was obtained. The risks, benefits and alternatives to therapy were discussed in detail. Specifically, the risks of nasal deformity, changes in the flow of air through the nose, infection, scarring, bleeding, prolonged wound healing, incomplete removal, allergy to anesthesia, nerve injury and recurrence were addressed. Prior to the procedure, the treatment site was clearly identified and confirmed by the patient. All components of Universal Protocol/PAUSE Rule completed.
Consent (Lip)/Introductory Paragraph: The rationale for Mohs was explained to the patient and consent was obtained. The risks, benefits and alternatives to therapy were discussed in detail. Specifically, the risks of lip deformity, changes in the oral aperture, infection, scarring, bleeding, prolonged wound healing, incomplete removal, allergy to anesthesia, nerve injury and recurrence were addressed. Prior to the procedure, the treatment site was clearly identified and confirmed by the patient. All components of Universal Protocol/PAUSE Rule completed.
Consent (Scalp)/Introductory Paragraph: The rationale for Mohs was explained to the patient and consent was obtained. The risks, benefits and alternatives to therapy were discussed in detail. Specifically, the risks of changes in hair growth pattern secondary to repair, infection, scarring, bleeding, prolonged wound healing, incomplete removal, allergy to anesthesia, nerve injury and recurrence were addressed. Prior to the procedure, the treatment site was clearly identified and confirmed by the patient. All components of Universal Protocol/PAUSE Rule completed.
Detail Level: Detailed
Postop Diagnosis: same
Surgeon: Arlene Carl MD
Anesthesia Volume In Cc: 6
Hemostasis: Electrocautery
Estimated Blood Loss (Cc): minimal
Brow Lift Text: A midfrontal incision was made medially to the defect to allow access to the tissues just superior to the left eyebrow. Following careful dissection inferiorly in a supraperiosteal plane to the level of the left eyebrow, several 3-0 monocryl sutures were used to resuspend the eyebrow orbicularis oculi muscular unit to the superior frontal bone periosteum. This resulted in an appropriate reapproximation of static eyebrow symmetry and correction of the left brow ptosis.
Epidermal Closure: running
Suturegard Intro: Intraoperative tissue expansion was performed, utilizing the SUTUREGARD device, in order to reduce wound tension.
Suturegard Body: The suture ends were repeatedly re-tightened and re-clamped to achieve the desired tissue expansion.
Hemigard Intro: Due to skin fragility and wound tension, it was decided to use HEMIGARD adhesive retention suture devices to permit a linear closure. The skin was cleaned and dried for a 6cm distance away from the wound. Excessive hair, if present, was removed to allow for adhesion.
Hemigard Postcare Instructions: The HEMIGARD strips are to remain completely dry for at least 5-7 days.
Donor Site Anesthesia Type: same as repair anesthesia
Epidermal Closure Graft Donor Site (Optional): simple interrupted
Graft Donor Site Bandage (Optional-Leave Blank If You Don't Want In Note): Steri-strips and a pressure bandage were applied to the donor site.
Closure 2 Information: This tab is for additional flaps and grafts, including complex repair and grafts and complex repair and flaps. You can also specify a different location for the additional defect, if the location is the same you do not need to select a new one. We will insert the automated text for the repair you select below just as we do for solitary flaps and grafts. Please note that at this time if you select a location with a different insurance zone you will need to override the ICD10 and CPT if appropriate.
Closure 3 Information: This tab is for additional flaps and grafts above and beyond our usual structured repairs.  Please note if you enter information here it will not currently bill and you will need to add the billing information manually.
Wound Care: Petrolatum
Dressing: pressure dressing
Dressing (No Sutures): dry sterile dressing
Unna Boot Text: An Unna boot was placed to help immobilize the limb and facilitate more rapid healing.
Home Suture Removal Text: Patient was provided instructions on removing sutures and will remove their sutures at home.  If they have any questions or difficulties they will call the office.
Post-Care Instructions: I reviewed with the patient in detail post-care instructions. Patient is not to engage in any heavy lifting, exercise, or swimming for the next 14 days. Should the patient develop any fevers, chills, bleeding, severe pain patient will contact the office immediately.
Pain Refusal Text: I offered to prescribe pain medication but the patient refused to take this medication.
Mauc Instructions: By selecting yes to the question below the MAUC number will be added into the note.  This will be calculated automatically based on the diagnosis chosen, the size entered, the body zone selected (H,M,L) and the specific indications you chose. You will also have the option to override the Mohs AUC if you disagree with the automatically calculated number and this option is found in the Case Summary tab.
Where Do You Want The Question To Include Opioid Counseling Located?: Case Summary Tab
Eye Protection Verbiage: Before proceeding with the stage, a plastic scleral shield was inserted. The globe was anesthetized with a few drops of proparacaine hydrochloride ophthalmic solution, USP 0.5%. Then, an appropriate sized scleral shield was chosen and coated with lacrilube ointment. The shield was gently inserted and left in place for the duration of each stage. After the stage was completed, the shield was gently removed.
Mohs Method Verbiage: An incision at a 90 degree angle following the standard Mohs approach was done and the specimen was harvested as a microscopic controlled layer.
Surgeon/Pathologist Verbiage (Will Incorporate Name Of Surgeon From Intro If Not Blank): operated in two distinct and integrated capacities as the surgeon and pathologist.
Mohs Histo Method Verbiage: Each section was then chromacoded and processed in the Mohs lab using the Mohs protocol and submitted for tissue processing
Subsequent Stages Histo Method Verbiage: Using a similar technique to that described above, a thin layer of tissue was removed from all areas where tumor was visible on the previous stage.  The tissue was again oriented, mapped, dyed, and processed as above.
Mohs Rapid Report Verbiage: The area of clinically evident tumor was marked with skin marking ink and appropriately hatched.  The initial incision was made following the Mohs approach through the skin.  The specimen was taken to the lab, divided into the necessary number of pieces, chromacoded and processed according to the Mohs protocol.  This was repeated in successive stages until a tumor free defect was achieved.
Complex Repair Preamble Text (Leave Blank If You Do Not Want): Extensive wide undermining was performed.
Intermediate Repair Preamble Text (Leave Blank If You Do Not Want): Undermining was performed with blunt dissection.
Graft Cartilage Fenestration Text: The cartilage was fenestrated with a 2mm punch biopsy to help facilitate graft survival and healing.
Non-Graft Cartilage Fenestration Text: The cartilage was fenestrated with a 2mm punch biopsy to help facilitate healing.
Secondary Intention Text (Leave Blank If You Do Not Want): The defect will heal with secondary intention.
No Repair - Repaired With Adjacent Surgical Defect Text (Leave Blank If You Do Not Want): After obtaining clear surgical margins the defect was repaired concurrently with another surgical defect which was in close approximation.
Unique Flap 1 Name:  Pedicled Propellar Flap
Unique Flap 1 Text: A decision was made to reconstruct the defect utilizing an  pedicled propellar flap.  The donor pedicle which included the  was injected with anesthesia.  The flap was excised through the skin and subcutaneous tissue down to the layer of the underlying musculature.  The flap was carefully excised within this deep plane to maintain its blood supply.  The edges of the donor site were undermined.   The donor site was closed in a primary fashion.  The flap was then rotated into position and sutured and the pedicle was tucked underneath the skin surface.  Once the flap was sutured into place, adequate blood supply was confirmed with blanching and refill.
Adjacent Tissue Transfer Text: The defect edges were debeveled with a #15 scalpel blade. Given the location of the defect and the proximity to free margins an adjacent tissue transfer was deemed most appropriate. Using a sterile surgical marker, an appropriate flap was drawn incorporating the defect and placing the expected incisions within the relaxed skin tension lines where possible. The area thus outlined was incised deep to adipose tissue with a #15 scalpel blade. The skin margins were undermined to an appropriate distance in all directions utilizing iris scissors.
Advancement Flap (Single) Text: The defect edges were debeveled with a #15 scalpel blade. Given the location of the defect and the proximity to free margins a single advancement flap was deemed most appropriate. Using a sterile surgical marker, an appropriate advancement flap was drawn incorporating the defect and placing the expected incisions within the relaxed skin tension lines where possible. The area thus outlined was incised deep to adipose tissue with a #15 scalpel blade. The skin margins were undermined to an appropriate distance in all directions utilizing iris scissors. Following this, the designed flap was advanced into the primary defect and sutured into place.
Advancement Flap (Double) Text: The defect edges were debeveled with a #15 scalpel blade. Given the location of the defect and the proximity to free margins a double advancement flap was deemed most appropriate. Using a sterile surgical marker, the appropriate advancement flaps were drawn incorporating the defect and placing the expected incisions within the relaxed skin tension lines where possible. The area thus outlined was incised deep to adipose tissue with a #15 scalpel blade. The skin margins were undermined to an appropriate distance in all directions utilizing iris scissors. Following this, the designed flaps were advanced into the primary defect and sutured into place.
Advancement-Rotation Flap Text: The defect edges were debeveled with a #15 scalpel blade. Given the location of the defect, shape of the defect and the proximity to free margins an advancement-rotation flap was deemed most appropriate. Using a sterile surgical marker, an appropriate flap was drawn incorporating the defect and placing the expected incisions within the relaxed skin tension lines where possible. The area thus outlined was incised deep to adipose tissue with a #15 scalpel blade. The skin margins were undermined to an appropriate distance in all directions utilizing iris scissors. Following this, the designed flap was placed into the primary defect and sutured into place.
Alar Island Pedicle Flap Text: The defect edges were debeveled with a #15 scalpel blade. Given the location of the defect, shape of the defect and the proximity to the alar rim an island pedicle advancement flap was deemed most appropriate. Using a sterile surgical marker, an appropriate advancement flap was drawn incorporating the defect, outlining the appropriate donor tissue and placing the expected incisions within the nasal ala running parallel to the alar rim. The area thus outlined was incised with a #15 scalpel blade. The skin margins were undermined minimally to an appropriate distance in all directions around the primary defect and laterally outward around the island pedicle utilizing iris scissors.  There was minimal undermining beneath the pedicle flap. Following this, the designed flap was placed in the primary defect and sutured into place.
A-T Advancement Flap Text: The defect edges were debeveled with a #15 scalpel blade. Given the location of the defect, shape of the defect and the proximity to free margins an A-T advancement flap was deemed most appropriate. Using a sterile surgical marker, an appropriate advancement flap was drawn incorporating the defect and placing the expected incisions within the relaxed skin tension lines where possible. The area thus outlined was incised deep to adipose tissue with a #15 scalpel blade. The skin margins were undermined to an appropriate distance in all directions utilizing iris scissors. Following this, the designed flap was advanced into the primary defect and sutured into place.
Banner Transposition Flap Text: The defect edges were debeveled with a #15 scalpel blade. Given the location of the defect and the proximity to free margins a Banner transposition flap was deemed most appropriate. Using a sterile surgical marker, an appropriate flap was drawn around the defect. The area thus outlined was incised deep to adipose tissue with a #15 scalpel blade. The skin margins were undermined to an appropriate distance in all directions utilizing iris scissors. Following this, the designed flap was placed in the primary defect and sutured into place.
Bilateral Helical Rim Advancement Flap Text: The defect edges were debeveled with a #15 blade scalpel.  Given the location of the defect and the proximity to free margins (helical rim) a bilateral helical rim advancement flap was deemed most appropriate. Using a sterile surgical marker, the appropriate advancement flaps were drawn incorporating the defect and placing the expected incisions between the helical rim and antihelix where possible.  The area thus outlined was incised through and through with a #15 scalpel blade.  With a skin hook and iris scissors, the flaps were gently and sharply undermined and freed up. Following this, the designed flaps were placed into the primary defect and sutured into place.
Bilateral Rotation Flap Text: The defect edges were debeveled with a #15 scalpel blade. Given the location of the defect, shape of the defect and the proximity to free margins a bilateral rotation flap was deemed most appropriate. Using a sterile surgical marker, an appropriate rotation flap was drawn incorporating the defect and placing the expected incisions within the relaxed skin tension lines where possible. The area thus outlined was incised deep to adipose tissue with a #15 scalpel blade. The skin margins were undermined to an appropriate distance in all directions utilizing iris scissors. Following this, the designed flap was carried over into the primary defect and sutured into place.
Bilobed Flap Text: The defect edges were debeveled with a #15 scalpel blade. Given the location of the defect and the proximity to free margins a bilobe flap was deemed most appropriate. Using a sterile surgical marker, an appropriate bilobe flap drawn around the defect. The area thus outlined was incised deep to adipose tissue with a #15 scalpel blade. The skin margins were undermined to an appropriate distance in all directions utilizing iris scissors.  Following this, the designed flap was placed into the primary defect and sutured into place.
Bilobed Transposition Flap Text: The defect edges were debeveled with a #15 scalpel blade. Given the location of the defect and the proximity to free margins a bilobed transposition flap was deemed most appropriate. Using a sterile surgical marker, an appropriate bilobe flap drawn around the defect. The area thus outlined was incised deep to adipose tissue with a #15 scalpel blade. The skin margins were undermined to an appropriate distance in all directions utilizing iris scissors.  Following this, the designed flap was placed into the primary defect and sutured into place.
Bi-Rhombic Flap Text: The defect edges were debeveled with a #15 scalpel blade. Given the location of the defect and the proximity to free margins a bi-rhombic flap was deemed most appropriate. Using a sterile surgical marker, an appropriate rhombic flap was drawn incorporating the defect. The area thus outlined was incised deep to adipose tissue with a #15 scalpel blade. The skin margins were undermined to an appropriate distance in all directions utilizing iris scissors. Following this, the designed flap was placed into the primary defect and sutured into place.
Burow's Advancement Flap Text: The defect edges were debeveled with a #15 scalpel blade. Given the location of the defect and the proximity to free margins a Burow's advancement flap was deemed most appropriate. Using a sterile surgical marker, the appropriate advancement flap was drawn incorporating the defect and placing the expected incisions within the relaxed skin tension lines where possible. The area thus outlined was incised deep to adipose tissue with a #15 scalpel blade. The skin margins were undermined to an appropriate distance in all directions utilizing iris scissors. Following this, the designed flap was advanced into the primary defect and sutured into place.
Chonodrocutaneous Helical Advancement Flap Text: The defect edges were debeveled with a #15 scalpel blade. Given the location of the defect and the proximity to free margins a chondrocutaneous helical advancement flap was deemed most appropriate. Using a sterile surgical marker, the appropriate advancement flap was drawn incorporating the defect and placing the expected incisions within the relaxed skin tension lines where possible. The area thus outlined was incised deep to adipose tissue with a #15 scalpel blade. The skin margins were undermined to an appropriate distance in all directions utilizing iris scissors. Following this, the designed flap was advanced into the primary defect and sutured into place.
Crescentic Advancement Flap Text: The defect edges were debeveled with a #15 scalpel blade. Given the location of the defect and the proximity to free margins a crescentic advancement flap was deemed most appropriate. Using a sterile surgical marker, the appropriate advancement flap was drawn incorporating the defect and placing the expected incisions within the relaxed skin tension lines where possible. The area thus outlined was incised deep to adipose tissue with a #15 scalpel blade. The skin margins were undermined to an appropriate distance in all directions utilizing iris scissors. Following this, the designed flap was advanced into the primary defect and sutured into place.
Dorsal Nasal Flap Text: The defect edges were debeveled with a #15 scalpel blade. Given the location of the defect and the proximity to free margins a dorsal nasal flap was deemed most appropriate. Using a sterile surgical marker, an appropriate dorsal nasal flap was drawn around the defect. The area thus outlined was incised deep to adipose tissue with a #15 scalpel blade. The skin margins were undermined to an appropriate distance in all directions utilizing iris scissors. Following this, the designed flap was placed in the primary defect and sutured into place.
Double Island Pedicle Flap Text: The defect edges were debeveled with a #15 scalpel blade. Given the location of the defect, shape of the defect and the proximity to free margins a double island pedicle advancement flap was deemed most appropriate. Using a sterile surgical marker, an appropriate advancement flap was drawn incorporating the defect, outlining the appropriate donor tissue and placing the expected incisions within the relaxed skin tension lines where possible. The area thus outlined was incised deep to adipose tissue with a #15 scalpel blade. The skin margins were undermined to an appropriate distance in all directions around the primary defect and laterally outward around the island pedicle utilizing iris scissors.  There was minimal undermining beneath the pedicle flap. Following this, the flap was placed into the primary defect and sutured into place.
Double O-Z Flap Text: The defect edges were debeveled with a #15 scalpel blade. Given the location of the defect, shape of the defect and the proximity to free margins a Double O-Z flap was deemed most appropriate. Using a sterile surgical marker, an appropriate transposition flap was drawn incorporating the defect and placing the expected incisions within the relaxed skin tension lines where possible. The area thus outlined was incised deep to adipose tissue with a #15 scalpel blade. The skin margins were undermined to an appropriate distance in all directions utilizing iris scissors. Following this, the designed flap was placed into the primary defect and sutured into place.
Double O-Z Plasty Text: The defect edges were debeveled with a #15 scalpel blade. Given the location of the defect, shape of the defect and the proximity to free margins a Double O-Z plasty (double transposition flap) was deemed most appropriate. Using a sterile surgical marker, the appropriate transposition flaps were drawn incorporating the defect and placing the expected incisions within the relaxed skin tension lines where possible. The area thus outlined was incised deep to adipose tissue with a #15 scalpel blade. The skin margins were undermined to an appropriate distance in all directions utilizing iris scissors.  Hemostasis was achieved with electrocautery.  The flaps were then transposed into place, one clockwise and the other counterclockwise, and anchored with interrupted buried subcutaneous sutures.
Double Z Plasty Text: The lesion was extirpated to the level of the fat with a #15 scalpel blade. Given the location of the defect, shape of the defect and the proximity to free margins a double Z-plasty was deemed most appropriate for repair. Using a sterile surgical marker, the appropriate transposition arms of the double Z-plasty were drawn incorporating the defect and placing the expected incisions within the relaxed skin tension lines where possible. The area thus outlined was incised deep to adipose tissue with a #15 scalpel blade. The skin margins were undermined to an appropriate distance in all directions utilizing iris scissors. The opposing transposition arms were then transposed and carried over into place in opposite direction and anchored with interrupted buried subcutaneous sutures.
Ear Star Wedge Flap Text: The defect edges were debeveled with a #15 blade scalpel.  Given the location of the defect and the proximity to free margins (helical rim) an ear star wedge flap was deemed most appropriate. Using a sterile surgical marker, the appropriate flap was drawn incorporating the defect and placing the expected incisions between the helical rim and antihelix where possible.  The area thus outlined was incised through and through with a #15 scalpel blade. Following this, the designed flap was placed in the primary defect and sutured into place.
Flip-Flop Flap Text: The defect edges were debeveled with a #15 blade scalpel.  Given the location of the defect and the proximity to free margins a flip-flop flap was deemed most appropriate. Using a sterile surgical marker, the appropriate flap was drawn incorporating the defect and placing the expected incisions between the helical rim and antihelix where possible.  The area thus outlined was incised through and through with a #15 scalpel blade. Following this, the designed flap was carried over into the primary defect and sutured into place.
Hatchet Flap Text: The defect edges were debeveled with a #15 scalpel blade. Given the location of the defect, shape of the defect and the proximity to free margins a hatchet flap was deemed most appropriate. Using a sterile surgical marker, an appropriate hatchet flap was drawn incorporating the defect and placing the expected incisions within the relaxed skin tension lines where possible. The area thus outlined was incised deep to adipose tissue with a #15 scalpel blade. The skin margins were undermined to an appropriate distance in all directions utilizing iris scissors. Following this, the designed flap was placed into the primary defect and sutured into place.
Helical Rim Advancement Flap Text: The defect edges were debeveled with a #15 blade scalpel.  Given the location of the defect and the proximity to free margins (helical rim) a double helical rim advancement flap was deemed most appropriate. Using a sterile surgical marker, the appropriate advancement flaps were drawn incorporating the defect and placing the expected incisions between the helical rim and antihelix where possible.  The area thus outlined was incised through and through with a #15 scalpel blade.  With a skin hook and iris scissors, the flaps were gently and sharply undermined and freed up. Folllowing this, the designed flaps were placed into the primary defect and sutured into place.
H Plasty Text: Given the location of the defect, shape of the defect and the proximity to free margins a H-plasty was deemed most appropriate for repair. Using a sterile surgical marker, the appropriate advancement arms of the H-plasty were drawn incorporating the defect and placing the expected incisions within the relaxed skin tension lines where possible. The area thus outlined was incised deep to adipose tissue with a #15 scalpel blade. The skin margins were undermined to an appropriate distance in all directions utilizing iris scissors.  The opposing advancement arms were then advanced into place in opposite direction and anchored with interrupted buried subcutaneous sutures.
Island Pedicle Flap Text: The defect edges were debeveled with a #15 scalpel blade. Given the location of the defect, shape of the defect and the proximity to free margins an island pedicle advancement flap was deemed most appropriate. Using a sterile surgical marker, an appropriate advancement flap was drawn incorporating the defect, outlining the appropriate donor tissue and placing the expected incisions within the relaxed skin tension lines where possible. The area thus outlined was incised deep to adipose tissue with a #15 scalpel blade. The skin margins were undermined to an appropriate distance in all directions around the primary defect and laterally outward around the island pedicle utilizing iris scissors.  There was minimal undermining beneath the pedicle flap. Following this, the flap was placed into the primary defect and sutured into place.
Island Pedicle Flap With Canthal Suspension Text: The defect edges were debeveled with a #15 scalpel blade. Given the location of the defect, shape of the defect and the proximity to free margins an island pedicle advancement flap was deemed most appropriate. Using a sterile surgical marker, an appropriate advancement flap was drawn incorporating the defect, outlining the appropriate donor tissue and placing the expected incisions within the relaxed skin tension lines where possible. The area thus outlined was incised deep to adipose tissue with a #15 scalpel blade. The skin margins were undermined to an appropriate distance in all directions around the primary defect and laterally outward around the island pedicle utilizing iris scissors.  There was minimal undermining beneath the pedicle flap. A suspension suture was placed in the canthal tendon to prevent tension and prevent ectropion. Following this, the designed flap was placed into the primary defect and sutured into place.
Island Pedicle Flap-Requiring Vessel Identification Text: The defect edges were debeveled with a #15 scalpel blade. Given the location of the defect, shape of the defect and the proximity to free margins an island pedicle advancement flap was deemed most appropriate. Using a sterile surgical marker, an appropriate advancement flap was drawn, based on the axial vessel mentioned above, incorporating the defect, outlining the appropriate donor tissue and placing the expected incisions within the relaxed skin tension lines where possible. The area thus outlined was incised deep to adipose tissue with a #15 scalpel blade. The skin margins were undermined to an appropriate distance in all directions around the primary defect and laterally outward around the island pedicle utilizing iris scissors.  There was minimal undermining beneath the pedicle flap. Following this, the designed flap was placed in the primary defect and sutured into place.
Keystone Flap Text: The defect edges were debeveled with a #15 scalpel blade. Given the location of the defect, shape of the defect a keystone flap was deemed most appropriate. Using a sterile surgical marker, an appropriate keystone flap was drawn incorporating the defect, outlining the appropriate donor tissue and placing the expected incisions within the relaxed skin tension lines where possible. The area thus outlined was incised deep to adipose tissue with a #15 scalpel blade. The skin margins were undermined to an appropriate distance in all directions around the primary defect and laterally outward around the flap utilizing iris scissors. Following this, the designed flap was placed in the primary defect and sutured into place.
Melolabial Transposition Flap Text: The defect edges were debeveled with a #15 scalpel blade. Given the location of the defect and the proximity to free margins a melolabial flap was deemed most appropriate. Using a sterile surgical marker, an appropriate melolabial transposition flap was drawn incorporating the defect. The area thus outlined was incised deep to adipose tissue with a #15 scalpel blade. The skin margins were undermined to an appropriate distance in all directions utilizing iris scissors. Following this, the designed flap was placed into the primary defect and sutured into place.
Mercedes Flap Text: The defect edges were debeveled with a #15 scalpel blade. Given the location of the defect, shape of the defect and the proximity to free margins a Mercedes flap was deemed most appropriate. Using a sterile surgical marker, an appropriate advancement flap was drawn incorporating the defect and placing the expected incisions within the relaxed skin tension lines where possible. The area thus outlined was incised deep to adipose tissue with a #15 scalpel blade. The skin margins were undermined to an appropriate distance in all directions utilizing iris scissors. Following this, the designed flap was advanced into the primary defect and sutured into place.
Modified Advancement Flap Text: The defect edges were debeveled with a #15 scalpel blade. Given the location of the defect, shape of the defect and the proximity to free margins a modified advancement flap was deemed most appropriate. Using a sterile surgical marker, an appropriate advancement flap was drawn incorporating the defect and placing the expected incisions within the relaxed skin tension lines where possible. The area thus outlined was incised deep to adipose tissue with a #15 scalpel blade. The skin margins were undermined to an appropriate distance in all directions utilizing iris scissors. Following this, the designed flap was advanced into the primary defect and sutured into place.
Mucosal Advancement Flap Text: Given the location of the defect, shape of the defect and the proximity to free margins a mucosal advancement flap was deemed most appropriate. Incisions were made with a 15 blade scalpel in the appropriate fashion along the cutaneous vermilion border and the mucosal lip. The remaining actinically damaged mucosal tissue was excised.  The mucosal advancement flap was then elevated to the gingival sulcus with care taken to preserve the neurovascular structures and advanced into the primary defect. Care was taken to ensure that precise realignment of the vermilion border was achieved.
Muscle Hinge Flap Text: The defect edges were debeveled with a #15 scalpel blade.  Given the size, depth and location of the defect and the proximity to free margins a muscle hinge flap was deemed most appropriate. Using a sterile surgical marker, an appropriate hinge flap was drawn incorporating the defect. The area thus outlined was incised with a #15 scalpel blade. The skin margins were undermined to an appropriate distance in all directions utilizing iris scissors. Following this, the designed flap was placed in the primary defect and sutured into place.
Mustarde Flap Text: The defect edges were debeveled with a #15 scalpel blade.  Given the size, depth and location of the defect and the proximity to free margins a Mustarde flap was deemed most appropriate. Using a sterile surgical marker, an appropriate flap was drawn incorporating the defect. The area thus outlined was incised with a #15 scalpel blade. The skin margins were undermined to an appropriate distance in all directions utilizing iris scissors. Following this, the designed flap was placed in the primary defect and sutured into place.
Nasal Turnover Hinge Flap Text: The defect edges were debeveled with a #15 scalpel blade.  Given the size, depth, location of the defect and the defect being full thickness a nasal turnover hinge flap was deemed most appropriate. Using a sterile surgical marker, an appropriate hinge flap was drawn incorporating the defect. The area thus outlined was incised with a #15 scalpel blade. The flap was designed to recreate the nasal mucosal lining and the alar rim. The skin margins were undermined to an appropriate distance in all directions utilizing iris scissors. Following this, the designed flap was placed into the primary defect and sutured into place
Nasalis-Muscle-Based Myocutaneous Island Pedicle Flap Text: Using a #15 blade, an incision was made around the donor flap to the level of the nasalis muscle. Wide lateral undermining was then performed in both the subcutaneous plane above the nasalis muscle, and in a submuscular plane just above periosteum. This allowed the formation of a free nasalis muscle axial pedicle (based on the angular artery) which was still attached to the actual cutaneous flap, increasing its mobility and vascular viability. Hemostasis was obtained with pinpoint electrocoagulation. The flap was mobilized into position and the pivotal anchor points positioned and stabilized with buried interrupted sutures. Subcutaneous and dermal tissues were closed in a multilayered fashion with sutures. Tissue redundancies were excised, and the epidermal edges were apposed without significant tension and sutured with sutures.
Nasalis Myocutaneous Flap Text: Using a #15 blade, an incision was made around the donor flap to the level of the nasalis muscle. Wide lateral undermining was then performed in both the subcutaneous plane above the nasalis muscle, and in a submuscular plane just above periosteum. This allowed the formation of a free nasalis muscle axial pedicle which was still attached to the actual cutaneous flap, increasing its mobility and vascular viability. Hemostasis was obtained with pinpoint electrocoagulation. The flap was mobilized into position and the pivotal anchor points positioned and stabilized with buried interrupted sutures. Subcutaneous and dermal tissues were closed in a multilayered fashion with sutures. Tissue redundancies were excised, and the epidermal edges were apposed without significant tension and sutured with sutures.
Nasolabial Transposition Flap Text: The defect edges were debeveled with a #15 scalpel blade.  Given the size, depth and location of the defect and the proximity to free margins a nasolabial transposition flap was deemed most appropriate. Using a sterile surgical marker, an appropriate flap was drawn incorporating the defect. The area thus outlined was incised with a #15 scalpel blade. The skin margins were undermined to an appropriate distance in all directions utilizing iris scissors. Following this, the designed flap was carried into the primary defect and sutured into place.
Orbicularis Oris Muscle Flap Text: The defect edges were debeveled with a #15 scalpel blade.  Given that the defect affected the competency of the oral sphincter an orbicularis oris muscle flap was deemed most appropriate to restore this competency and normal muscle function.  Using a sterile surgical marker, an appropriate flap was drawn incorporating the defect. The area thus outlined was incised with a #15 scalpel blade. Following this, the designed flap was placed into the primary defect and sutured into place.
O-T Advancement Flap Text: The defect edges were debeveled with a #15 scalpel blade. Given the location of the defect, shape of the defect and the proximity to free margins an O-T advancement flap was deemed most appropriate. Using a sterile surgical marker, an appropriate advancement flap was drawn incorporating the defect and placing the expected incisions within the relaxed skin tension lines where possible. The area thus outlined was incised deep to adipose tissue with a #15 scalpel blade. The skin margins were undermined to an appropriate distance in all directions utilizing iris scissors. Following this, the designed flap was advanced into the primary defect and sutured into place.
O-T Plasty Text: The defect edges were debeveled with a #15 scalpel blade. Given the location of the defect, shape of the defect and the proximity to free margins an O-T plasty was deemed most appropriate. Using a sterile surgical marker, an appropriate O-T plasty was drawn incorporating the defect and placing the expected incisions within the relaxed skin tension lines where possible. The area thus outlined was incised deep to adipose tissue with a #15 scalpel blade. The skin margins were undermined to an appropriate distance in all directions utilizing iris scissors. Following this, the designed flap was placed into the primary defect and sutured into place.
O-L Flap Text: The defect edges were debeveled with a #15 scalpel blade. Given the location of the defect, shape of the defect and the proximity to free margins an O-L flap was deemed most appropriate. Using a sterile surgical marker, an appropriate advancement flap was drawn incorporating the defect and placing the expected incisions within the relaxed skin tension lines where possible. The area thus outlined was incised deep to adipose tissue with a #15 scalpel blade. The skin margins were undermined to an appropriate distance in all directions utilizing iris scissors. Following this, the designed flap was advanced into the primary defect and sutured into place.
O-Z Flap Text: The defect edges were debeveled with a #15 scalpel blade. Given the location of the defect, shape of the defect and the proximity to free margins an O-Z flap was deemed most appropriate. Using a sterile surgical marker, an appropriate transposition flap was drawn incorporating the defect and placing the expected incisions within the relaxed skin tension lines where possible. The area thus outlined was incised deep to adipose tissue with a #15 scalpel blade. The skin margins were undermined to an appropriate distance in all directions utilizing iris scissors. Following this, the designed flap was placed into the primary defect and sutured into place.
O-Z Plasty Text: The defect edges were debeveled with a #15 scalpel blade. Given the location of the defect, shape of the defect and the proximity to free margins an O-Z plasty (double transposition flap) was deemed most appropriate. Using a sterile surgical marker, the appropriate transposition flaps were drawn incorporating the defect and placing the expected incisions within the relaxed skin tension lines where possible. The area thus outlined was incised deep to adipose tissue with a #15 scalpel blade. The skin margins were undermined to an appropriate distance in all directions utilizing iris scissors.  Hemostasis was achieved with electrocautery.  The flaps were then transposed into place, one clockwise and the other counterclockwise, and anchored with interrupted buried subcutaneous sutures.
Peng Advancement Flap Text: The defect edges were debeveled with a #15 scalpel blade. Given the location of the defect, shape of the defect and the proximity to free margins a Peng advancement flap was deemed most appropriate. Using a sterile surgical marker, an appropriate advancement flap was drawn incorporating the defect and placing the expected incisions within the relaxed skin tension lines where possible. The area thus outlined was incised deep to adipose tissue with a #15 scalpel blade. The skin margins were undermined to an appropriate distance in all directions utilizing iris scissors. Following this, the designed flap was advanced into the primary defect and sutured into place.
Rectangular Flap Text: The defect edges were debeveled with a #15 scalpel blade. Given the location of the defect and the proximity to free margins a rectangular flap was deemed most appropriate. Using a sterile surgical marker, an appropriate rectangular flap was drawn incorporating the defect. The area thus outlined was incised deep to adipose tissue with a #15 scalpel blade. The skin margins were undermined to an appropriate distance in all directions utilizing iris scissors. Following this, the designed flap was carried over into the primary defect and sutured into place.
Rhombic Flap Text: The defect edges were debeveled with a #15 scalpel blade. Given the location of the defect and the proximity to free margins a rhombic flap was deemed most appropriate. Using a sterile surgical marker, an appropriate rhombic flap was drawn incorporating the defect. The area thus outlined was incised deep to adipose tissue with a #15 scalpel blade. The skin margins were undermined to an appropriate distance in all directions utilizing iris scissors. Following this, the designed flap was placed into the primary defect and sutured into place.
Rhomboid Transposition Flap Text: The defect edges were debeveled with a #15 scalpel blade. Given the location of the defect and the proximity to free margins a rhomboid transposition flap was deemed most appropriate. Using a sterile surgical marker, an appropriate rhomboid flap was drawn incorporating the defect. The area thus outlined was incised deep to adipose tissue with a #15 scalpel blade. The skin margins were undermined to an appropriate distance in all directions utilizing iris scissors. Following this, the designed flap was placed into the primary defect and sutured into place.
Rotation Flap Text: The defect edges were debeveled with a #15 scalpel blade. Given the location of the defect, shape of the defect and the proximity to free margins a rotation flap was deemed most appropriate. Using a sterile surgical marker, an appropriate rotation flap was drawn incorporating the defect and placing the expected incisions within the relaxed skin tension lines where possible. The area thus outlined was incised deep to adipose tissue with a #15 scalpel blade. The skin margins were undermined to an appropriate distance in all directions utilizing iris scissors. Following this, the designed flap was placed into the primary defect and sutured into place.
Spiral Flap Text: The defect edges were debeveled with a #15 scalpel blade. Given the location of the defect, shape of the defect and the proximity to free margins a spiral flap was deemed most appropriate. Using a sterile surgical marker, an appropriate rotation flap was drawn incorporating the defect and placing the expected incisions within the relaxed skin tension lines where possible. The area thus outlined was incised deep to adipose tissue with a #15 scalpel blade. The skin margins were undermined to an appropriate distance in all directions utilizing iris scissors. Following this, the designed flap was placed into the primary defect and sutured into place.
Staged Advancement Flap Text: The defect edges were debeveled with a #15 scalpel blade. Given the location of the defect, shape of the defect and the proximity to free margins a staged advancement flap was deemed most appropriate. Using a sterile surgical marker, an appropriate advancement flap was drawn incorporating the defect and placing the expected incisions within the relaxed skin tension lines where possible. The area thus outlined was incised deep to adipose tissue with a #15 scalpel blade. The skin margins were undermined to an appropriate distance in all directions utilizing iris scissors. Following this, the designed flap was placed into the primary defect and sutured into place.
Star Wedge Flap Text: The defect edges were debeveled with a #15 scalpel blade. Given the location of the defect, shape of the defect and the proximity to free margins a star wedge flap was deemed most appropriate. Using a sterile surgical marker, an appropriate rotation flap was drawn incorporating the defect and placing the expected incisions within the relaxed skin tension lines where possible. The area thus outlined was incised deep to adipose tissue with a #15 scalpel blade. The skin margins were undermined to an appropriate distance in all directions utilizing iris scissors. Following this, the designed flap was placed into the primary defect and sutured into place.
Transposition Flap Text: The defect edges were debeveled with a #15 scalpel blade. Given the location of the defect and the proximity to free margins a transposition flap was deemed most appropriate. Using a sterile surgical marker, an appropriate transposition flap was drawn incorporating the defect. The area thus outlined was incised deep to adipose tissue with a #15 scalpel blade. The skin margins were undermined to an appropriate distance in all directions utilizing iris scissors. Following this, the designed flap was placed into the primary defect and sutured into place.
Trilobed Flap Text: The defect edges were debeveled with a #15 scalpel blade. Given the location of the defect and the proximity to free margins a trilobed flap was deemed most appropriate. Using a sterile surgical marker, an appropriate trilobed flap was drawn around the defect. The area thus outlined was incised deep to adipose tissue with a #15 scalpel blade. The skin margins were undermined to an appropriate distance in all directions utilizing iris scissors. Following this, the designed flap was placed in the primary defect and sutured into place.
V-Y Flap Text: The defect edges were debeveled with a #15 scalpel blade. Given the location of the defect, shape of the defect and the proximity to free margins a V-Y flap was deemed most appropriate. Using a sterile surgical marker, an appropriate advancement flap was drawn incorporating the defect and placing the expected incisions within the relaxed skin tension lines where possible. The area thus outlined was incised deep to adipose tissue with a #15 scalpel blade. The skin margins were undermined to an appropriate distance in all directions utilizing iris scissors. Following this, the designed flap was advanced into the primary defect and sutured into place.
V-Y Plasty Text: The defect edges were debeveled with a #15 scalpel blade. Given the location of the defect, shape of the defect and the proximity to free margins an V-Y advancement flap was deemed most appropriate. Using a sterile surgical marker, an appropriate advancement flap was drawn incorporating the defect and placing the expected incisions within the relaxed skin tension lines where possible. The area thus outlined was incised deep to adipose tissue with a #15 scalpel blade. The skin margins were undermined to an appropriate distance in all directions utilizing iris scissors. Following this, the designed flap was advanced into the primary defect and sutured into place.
W Plasty Text: The lesion was extirpated to the level of the fat with a #15 scalpel blade. Given the location of the defect, shape of the defect and the proximity to free margins a W-plasty was deemed most appropriate for repair. Using a sterile surgical marker, the appropriate transposition arms of the W-plasty were drawn incorporating the defect and placing the expected incisions within the relaxed skin tension lines where possible. The area thus outlined was incised deep to adipose tissue with a #15 scalpel blade. The skin margins were undermined to an appropriate distance in all directions utilizing iris scissors.  The opposing transposition arms were then transposed into place in opposite direction and anchored with interrupted buried subcutaneous sutures.
Z Plasty Text: The lesion was extirpated to the level of the fat with a #15 scalpel blade. Given the location of the defect, shape of the defect and the proximity to free margins a Z-plasty was deemed most appropriate for repair. Using a sterile surgical marker, the appropriate transposition arms of the Z-plasty were drawn incorporating the defect and placing the expected incisions within the relaxed skin tension lines where possible. The area thus outlined was incised deep to adipose tissue with a #15 scalpel blade. The skin margins were undermined to an appropriate distance in all directions utilizing iris scissors.  The opposing transposition arms were then transposed into place in opposite direction and anchored with interrupted buried subcutaneous sutures.
Zygomaticofacial Flap Text: Given the location of the defect, shape of the defect and the proximity to free margins a zygomaticofacial flap was deemed most appropriate for repair. Using a sterile surgical marker, the appropriate flap was drawn incorporating the defect and placing the expected incisions within the relaxed skin tension lines where possible. The area thus outlined was incised deep to adipose tissue with a #15 scalpel blade with preservation of a vascular pedicle.  The skin margins were undermined to an appropriate distance in all directions utilizing iris scissors.  The flap was then placed into the defect and anchored with interrupted buried subcutaneous sutures.
Abbe Flap (Lower To Upper Lip) Text: The defect of the upper lip was assessed and measured.  Given the location and size of the defect, an Abbe flap was deemed most appropriate. Using a sterile surgical marker, an appropriate Abbe flap was measured and drawn on the lower lip. Local anesthesia was then infiltrated. A scalpel was then used to incise the upper lip through and through the skin, vermilion, muscle and mucosa, leaving the flap pedicled on the opposite side.  The flap was then rotated and transferred to the lower lip defect.  The flap was then sutured into place with a three layer technique, closing the orbicularis oris muscle layer with subcutaneous buried sutures, followed by a mucosal layer and an epidermal layer.
Abbe Flap (Upper To Lower Lip) Text: The defect of the lower lip was assessed and measured.  Given the location and size of the defect, an Abbe flap was deemed most appropriate. Using a sterile surgical marker, an appropriate Abbe flap was measured and drawn on the upper lip. Local anesthesia was then infiltrated.  A scalpel was then used to incise the upper lip through and through the skin, vermilion, muscle and mucosa, leaving the flap pedicled on the opposite side.  The flap was then rotated and transferred to the lower lip defect.  The flap was then sutured into place with a three layer technique, closing the orbicularis oris muscle layer with subcutaneous buried sutures, followed by a mucosal layer and an epidermal layer.
Cheek Interpolation Flap Text: A decision was made to reconstruct the defect utilizing an interpolation axial flap and a staged reconstruction.  A telfa template was made of the defect.  This telfa template was then used to outline the Cheek Interpolation flap.  The donor area for the pedicle flap was then injected with anesthesia.  The flap was excised through the skin and subcutaneous tissue down to the layer of the underlying musculature.  The interpolation flap was carefully excised within this deep plane to maintain its blood supply.  The edges of the donor site were undermined.   The donor site was closed in a primary fashion.  The pedicle was then rotated into position and sutured.  Once the tube was sutured into place, adequate blood supply was confirmed with blanching and refill.  The pedicle was then wrapped with xeroform gauze and dressed appropriately with a telfa and gauze bandage to ensure continued blood supply and protect the attached pedicle.
Cheek-To-Nose Interpolation Flap Text: A decision was made to reconstruct the defect utilizing an interpolation axial flap and a staged reconstruction.  A telfa template was made of the defect.  This telfa template was then used to outline the Cheek-To-Nose Interpolation flap.  The donor area for the pedicle flap was then injected with anesthesia.  The flap was excised through the skin and subcutaneous tissue down to the layer of the underlying musculature.  The interpolation flap was carefully excised within this deep plane to maintain its blood supply.  The edges of the donor site were undermined.   The donor site was closed in a primary fashion.  The pedicle was then rotated into position and sutured.  Once the tube was sutured into place, adequate blood supply was confirmed with blanching and refill.  The pedicle was then wrapped with xeroform gauze and dressed appropriately with a telfa and gauze bandage to ensure continued blood supply and protect the attached pedicle.
Estlander Flap (Lower To Upper Lip) Text: The defect of the lower lip was assessed and measured.  Given the location and size of the defect, an Estlander flap was deemed most appropriate. Using a sterile surgical marker, an appropriate Estlander flap was measured and drawn on the upper lip. Local anesthesia was then infiltrated. A scalpel was then used to incise the lateral aspect of the flap, through skin, muscle and mucosa, leaving the flap pedicled medially.  The flap was then rotated and positioned to fill the lower lip defect.  The flap was then sutured into place with a three layer technique, closing the orbicularis oris muscle layer with subcutaneous buried sutures, followed by a mucosal layer and an epidermal layer.
Interpolation Flap Text: A decision was made to reconstruct the defect utilizing an interpolation axial flap and a staged reconstruction.  A telfa template was made of the defect.  This telfa template was then used to outline the interpolation flap.  The donor area for the pedicle flap was then injected with anesthesia.  The flap was excised through the skin and subcutaneous tissue down to the layer of the underlying musculature.  The interpolation flap was carefully excised within this deep plane to maintain its blood supply.  The edges of the donor site were undermined.   The donor site was closed in a primary fashion.  The pedicle was then rotated into position and sutured.  Once the tube was sutured into place, adequate blood supply was confirmed with blanching and refill.  The pedicle was then wrapped with xeroform gauze and dressed appropriately with a telfa and gauze bandage to ensure continued blood supply and protect the attached pedicle.
Melolabial Interpolation Flap Text: A decision was made to reconstruct the defect utilizing an interpolation axial flap and a staged reconstruction.  A telfa template was made of the defect.  This telfa template was then used to outline the melolabial interpolation flap.  The donor area for the pedicle flap was then injected with anesthesia.  The flap was excised through the skin and subcutaneous tissue down to the layer of the underlying musculature.  The pedicle flap was carefully excised within this deep plane to maintain its blood supply.  The edges of the donor site were undermined.   The donor site was closed in a primary fashion.  The pedicle was then rotated into position and sutured.  Once the tube was sutured into place, adequate blood supply was confirmed with blanching and refill.  The pedicle was then wrapped with xeroform gauze and dressed appropriately with a telfa and gauze bandage to ensure continued blood supply and protect the attached pedicle.
Mastoid Interpolation Flap Text: A decision was made to reconstruct the defect utilizing an interpolation axial flap and a staged reconstruction.  A telfa template was made of the defect.  This telfa template was then used to outline the mastoid interpolation flap.  The donor area for the pedicle flap was then injected with anesthesia.  The flap was excised through the skin and subcutaneous tissue down to the layer of the underlying musculature.  The pedicle flap was carefully excised within this deep plane to maintain its blood supply.  The edges of the donor site were undermined.   The donor site was closed in a primary fashion.  The pedicle was then rotated into position and sutured.  Once the tube was sutured into place, adequate blood supply was confirmed with blanching and refill.  The pedicle was then wrapped with xeroform gauze and dressed appropriately with a telfa and gauze bandage to ensure continued blood supply and protect the attached pedicle.
Paramedian Forehead Flap Text: A decision was made to reconstruct the defect utilizing an interpolation axial flap and a staged reconstruction.  A telfa template was made of the defect.  This telfa template was then used to outline the paramedian forehead pedicle flap.  The donor area for the pedicle flap was then injected with anesthesia.  The flap was excised through the skin and subcutaneous tissue down to the layer of the underlying musculature.  The pedicle flap was carefully excised within this deep plane to maintain its blood supply.  The edges of the donor site were undermined.   The donor site was closed in a primary fashion.  The pedicle was then rotated into position and sutured.  Once the tube was sutured into place, adequate blood supply was confirmed with blanching and refill.  The pedicle was then wrapped with xeroform gauze and dressed appropriately with a telfa and gauze bandage to ensure continued blood supply and protect the attached pedicle.
Posterior Auricular Interpolation Flap Text: A decision was made to reconstruct the defect utilizing an interpolation axial flap and a staged reconstruction.  A telfa template was made of the defect.  This telfa template was then used to outline the posterior auricular interpolation flap.  The donor area for the pedicle flap was then injected with anesthesia.  The flap was excised through the skin and subcutaneous tissue down to the layer of the underlying musculature.  The pedicle flap was carefully excised within this deep plane to maintain its blood supply.  The edges of the donor site were undermined.   The donor site was closed in a primary fashion.  The pedicle was then rotated into position and sutured.  Once the tube was sutured into place, adequate blood supply was confirmed with blanching and refill.  The pedicle was then wrapped with xeroform gauze and dressed appropriately with a telfa and gauze bandage to ensure continued blood supply and protect the attached pedicle.
Cheiloplasty (Complex) Text: A decision was made to reconstruct the defect with a  cheiloplasty.  The defect was undermined extensively.  Additional orbicularis oris muscle was excised with a 15 blade scalpel.  The defect was converted into a full thickness wedge to facilite a better cosmetic result.  Small vessels were then tied off with 5-0 monocyrl. The orbicularis oris, superficial fascia, adipose and dermis were then reapproximated.  After the deeper layers were approximated the epidermis was reapproximated with particular care given to realign the vermilion border.
Cheiloplasty (Less Than 50%) Text: A decision was made to reconstruct the defect with a  cheiloplasty.  The defect was undermined extensively.  Additional orbicularis oris muscle was excised with a 15 blade scalpel.  The defect was converted into a full thickness wedge, of less than 50% of the vertical height of the lip, to facilite a better cosmetic result.  Small vessels were then tied off with 5-0 monocyrl. The orbicularis oris, superficial fascia, adipose and dermis were then reapproximated.  After the deeper layers were approximated the epidermis was reapproximated with particular care given to realign the vermilion border.
Ear Wedge Repair Text: A wedge excision was completed by carrying down an excision through the full thickness of the ear and cartilage with an inward facing Burow's triangle. The wound was then closed in a layered fashion.
Full Thickness Lip Wedge Repair (Flap) Text: Given the location of the defect and the proximity to free margins a full thickness wedge repair was deemed most appropriate. Using a sterile surgical marker, the appropriate repair was drawn incorporating the defect and placing the expected incisions perpendicular to the vermilion border.  The vermilion border was also meticulously outlined to ensure appropriate reapproximation during the repair.  The area thus outlined was incised through and through with a #15 scalpel blade.  The muscularis and dermis were reaproximated with deep sutures following hemostasis. Care was taken to realign the vermilion border before proceeding with the superficial closure.  Once the vermilion was realigned the superfical and mucosal closure was finished.
Burow's Graft Text: The defect edges were debeveled with a #15 scalpel blade. Given the location of the defect, shape of the defect, the proximity to free margins and the presence of a standing cone deformity a Burow's skin graft was deemed most appropriate. The standing cone was removed and this tissue was then trimmed to the shape of the primary defect. The adipose tissue was also removed until only dermis and epidermis were left.  The skin margins of the secondary defect were undermined to an appropriate distance in all directions utilizing iris scissors.  The secondary defect was closed with interrupted buried subcutaneous sutures.  The skin edges were then re-apposed with running  sutures.  The skin graft was then placed in the primary defect and oriented appropriately.
Cartilage Graft Text: The defect edges were debeveled with a #15 scalpel blade. Given the location of the defect, shape of the defect, the fact the defect involved a full thickness cartilage defect a cartilage graft was deemed most appropriate.  An appropriate donor site was identified, cleansed, and anesthetized. The cartilage graft was then harvested and transferred to the recipient site, oriented appropriately and then sutured into place.  The secondary defect was then repaired using a primary closure.
Composite Graft Text: The defect edges were debeveled with a #15 scalpel blade. Given the location of the defect, shape of the defect, the proximity to free margins and the fact the defect was full thickness a composite graft was deemed most appropriate.  The defect was outline and then transferred to the donor site.  A full thickness graft was then excised from the donor site. The graft was then placed in the primary defect, oriented appropriately and then sutured into place.  The secondary defect was then repaired using a primary closure.
Epidermal Autograft Text: The defect edges were debeveled with a #15 scalpel blade. Given the location of the defect, shape of the defect and the proximity to free margins an epidermal autograft was deemed most appropriate. Using a sterile surgical marker, the primary defect shape was transferred to the donor site. The epidermal graft was then harvested.  The skin graft was then placed in the primary defect and oriented appropriately.
Dermal Autograft Text: The defect edges were debeveled with a #15 scalpel blade. Given the location of the defect, shape of the defect and the proximity to free margins a dermal autograft was deemed most appropriate. Using a sterile surgical marker, the primary defect shape was transferred to the donor site. The area thus outlined was incised deep to adipose tissue with a #15 scalpel blade.  The harvested graft was then trimmed of adipose and epidermal tissue until only dermis was left.  The skin graft was then placed in the primary defect and oriented appropriately.
Ftsg Text: The defect edges were debeveled with a #15 scalpel blade. Given the location of the defect, shape of the defect and the proximity to free margins a full thickness skin graft was deemed most appropriate. Using a sterile surgical marker, the primary defect shape was transferred to the donor site. The area thus outlined was incised deep to adipose tissue with a #15 scalpel blade.  The harvested graft was then trimmed of adipose tissue until only dermis and epidermis was left.  The skin margins of the secondary defect were undermined to an appropriate distance in all directions utilizing iris scissors.  The secondary defect was closed with interrupted buried subcutaneous sutures.  The skin edges were then re-apposed with running  sutures.  The skin graft was then placed in the primary defect and oriented appropriately.
Pinch Graft Text: The defect edges were debeveled with a #15 scalpel blade. Given the location of the defect, shape of the defect and the proximity to free margins a pinch graft was deemed most appropriate. Using a sterile surgical marker, the primary defect shape was transferred to the donor site. The area thus outlined was incised deep to adipose tissue with a #15 scalpel blade.  The harvested graft was then trimmed of adipose tissue until only dermis and epidermis was left. The skin margins of the secondary defect were undermined to an appropriate distance in all directions utilizing iris scissors.  The secondary defect was closed with interrupted buried subcutaneous sutures.  The skin edges were then re-apposed with running  sutures.  The skin graft was then placed in the primary defect and oriented appropriately.
Skin Substitute Text: The defect edges were debeveled with a #15 scalpel blade. Given the location of the defect, shape of the defect and the proximity to free margins a skin substitute graft was deemed most appropriate.  The graft material was trimmed to fit the size of the defect. The graft was then placed in the primary defect and oriented appropriately.
Split-Thickness Skin Graft Text: The defect edges were debeveled with a #15 scalpel blade. Given the location of the defect, shape of the defect and the proximity to free margins a split thickness skin graft was deemed most appropriate. Using a sterile surgical marker, the primary defect shape was transferred to the donor site. The split thickness graft was then harvested.  The skin graft was then placed in the primary defect and oriented appropriately.
Tissue Cultured Epidermal Autograft Text: The defect edges were debeveled with a #15 scalpel blade. Given the location of the defect, shape of the defect and the proximity to free margins a tissue cultured epidermal autograft was deemed most appropriate.  The graft was then trimmed to fit the size of the defect.  The graft was then placed in the primary defect and oriented appropriately.
Xenograft Text: The defect edges were debeveled with a #15 scalpel blade. Given the location of the defect, shape of the defect and the proximity to free margins a xenograft was deemed most appropriate.  The graft was then trimmed to fit the size of the defect.  The graft was then placed in the primary defect and oriented appropriately.
Complex Repair And Flap Additional Text (Will Appearing After The Standard Complex Repair Text): The complex repair was not sufficient to completely close the primary defect. The remaining additional defect was repaired with the flap mentioned below.
Complex Repair And Graft Additional Text (Will Appearing After The Standard Complex Repair Text): The complex repair was not sufficient to completely close the primary defect. The remaining additional defect was repaired with the graft mentioned below.
Eyelid Full Thickness Repair - 10536: The eyelid defect was full thickness which required a wedge repair of the eyelid. Special care was taken to ensure that the eyelid margin was realligned when placing sutures.
Eyelid Partial Thickness Repair - 10691: The eyelid defect was partial thickness which required a wedge repair of the eyelid. Special care was taken to ensure that the eyelid margin was realligned when placing sutures.
Intermediate Repair And Flap Additional Text (Will Appearing After The Standard Complex Repair Text): The intermediate repair was not sufficient to completely close the primary defect. The remaining additional defect was repaired with the flap mentioned below.
Intermediate Repair And Graft Additional Text (Will Appearing After The Standard Complex Repair Text): The intermediate repair was not sufficient to completely close the primary defect. The remaining additional defect was repaired with the graft mentioned below.
Localized Dermabrasion With 15 Blade Text: The patient was draped in routine manner.  Localized dermabrasion using a 15 blade was performed in routine manner to papillary dermis. This spot dermabrasion is being performed to complete skin cancer reconstruction. It also will eliminate the other sun damaged precancerous cells that are known to be part of the regional effect of a lifetime's worth of sun exposure. This localized dermabrasion is therapeutic and should not be considered cosmetic in any regard.
Localized Dermabrasion With Sand Papertext: The patient was draped in routine manner.  Localized dermabrasion using sterile sand paper was performed in routine manner to papillary dermis. This spot dermabrasion is being performed to complete skin cancer reconstruction. It also will eliminate the other sun damaged precancerous cells that are known to be part of the regional effect of a lifetime's worth of sun exposure. This localized dermabrasion is therapeutic and should not be considered cosmetic in any regard.
Localized Dermabrasion With Wire Brush Text: The patient was draped in routine manner.  Localized dermabrasion using 3 x 17 mm wire brush was performed in routine manner to papillary dermis. This spot dermabrasion is being performed to complete skin cancer reconstruction. It also will eliminate the other sun damaged precancerous cells that are known to be part of the regional effect of a lifetime's worth of sun exposure. This localized dermabrasion is therapeutic and should not be considered cosmetic in any regard.
Purse String (Simple) Text: Given the location of the defect and the characteristics of the surrounding skin a purse string closure was deemed most appropriate.  Undermining was performed circumferentially around the surgical defect.  A purse string suture was then placed and tightened.
Purse String (Intermediate) Text: Given the location of the defect and the characteristics of the surrounding skin a purse string intermediate closure was deemed most appropriate.  Undermining was performed circumferentially around the surgical defect.  A purse string suture was then placed and tightened.
Partial Purse String (Simple) Text: Given the location of the defect and the characteristics of the surrounding skin a simple purse string closure was deemed most appropriate.  Undermining was performed circumferentially around the surgical defect.  A purse string suture was then placed and tightened. Wound tension only allowed a partial closure of the circular defect.
Partial Purse String (Intermediate) Text: Given the location of the defect and the characteristics of the surrounding skin an intermediate purse string closure was deemed most appropriate.  Undermining was performed circumferentially around the surgical defect.  A purse string suture was then placed and tightened. Wound tension only allowed a partial closure of the circular defect.
Tarsorrhaphy Text: A tarsorrhaphy was performed using Frost sutures.
Manual Repair Warning Statement: We plan on removing the manually selected variable below in favor of our much easier automatic structured text blocks found in the previous tab. We decided to do this to help make the flow better and give you the full power of structured data. Manual selection is never going to be ideal in our platform and I would encourage you to avoid using manual selection from this point on, especially since I will be sunsetting this feature. It is important that you do one of two things with the customized text below. First, you can save all of the text in a word file so you can have it for future reference. Second, transfer the text to the appropriate area in the Library tab. Lastly, if there is a flap or graft type which we do not have you need to let us know right away so I can add it in before the variable is hidden. No need to panic, we plan to give you roughly 6 months to make the change.
Same Histology In Subsequent Stages Text: The pattern and morphology of the tumor is as described in the first stage.
No Residual Tumor Seen Histology Text: There were no malignant cells seen in the sections examined.
Inflammation Suggestive Of Cancer Camouflage Histology Text: There was a dense lymphocytic infiltrate which prevented adequate histologic evaluation of adjacent structures.
Bcc Histology Text: There were numerous aggregates of basaloid cells.
Bcc Infiltrative Histology Text: There were numerous aggregates of basaloid cells demonstrating an infiltrative pattern.
Mart-1 - Positive Histology Text: MART-1 staining demonstrates areas of higher density and clustering of melanocytes with Pagetoid spread upwards within the epidermis. The surgical margins are positive for tumor cells.
Mart-1 - Negative Histology Text: MART-1 staining demonstrates a normal density and pattern of melanocytes along the dermal-epidermal junction. The surgical margins are negative for tumor cells.
Information: Selecting Yes will display possible errors in your note based on the variables you have selected. This validation is only offered as a suggestion for you. PLEASE NOTE THAT THE VALIDATION TEXT WILL BE REMOVED WHEN YOU FINALIZE YOUR NOTE. IF YOU WANT TO FAX A PRELIMINARY NOTE YOU WILL NEED TO TOGGLE THIS TO 'NO' IF YOU DO NOT WANT IT IN YOUR FAXED NOTE.
Bill 59 Modifier?: No - Continue to Bill 79 Modifier

## 2025-02-27 ENCOUNTER — APPOINTMENT (OUTPATIENT)
Dept: ULTRASOUND IMAGING | Age: 75
End: 2025-02-27
Payer: MEDICARE

## 2025-02-27 ENCOUNTER — APPOINTMENT (OUTPATIENT)
Dept: GENERAL RADIOLOGY | Age: 75
End: 2025-02-27
Payer: MEDICARE

## 2025-02-27 ENCOUNTER — HOSPITAL ENCOUNTER (EMERGENCY)
Age: 75
Discharge: HOME | End: 2025-02-27
Payer: MEDICARE

## 2025-02-27 VITALS
DIASTOLIC BLOOD PRESSURE: 80 MMHG | BODY MASS INDEX: 42.06 KG/M2 | RESPIRATION RATE: 16 BRPM | HEIGHT: 67 IN | TEMPERATURE: 97.7 F | OXYGEN SATURATION: 98 % | SYSTOLIC BLOOD PRESSURE: 142 MMHG | HEART RATE: 64 BPM | WEIGHT: 268 LBS

## 2025-02-27 DIAGNOSIS — M79.605 LEFT LEG PAIN: Primary | ICD-10-CM

## 2025-02-27 DIAGNOSIS — S86.812A STRAIN OF CALF MUSCLE, LEFT, INITIAL ENCOUNTER: ICD-10-CM

## 2025-02-27 DIAGNOSIS — R60.9 DEPENDENT EDEMA: ICD-10-CM

## 2025-02-27 LAB
ANION GAP SERPL CALC-SCNC: 10 MMOL/L (ref 7–16)
BASOPHILS # BLD: 0.04 K/UL (ref 0–0.2)
BASOPHILS NFR BLD: 0.9 % (ref 0–2)
BUN SERPL-MCNC: 21 MG/DL (ref 8–23)
CALCIUM SERPL-MCNC: 9 MG/DL (ref 8.8–10.2)
CHLORIDE SERPL-SCNC: 108 MMOL/L (ref 98–107)
CO2 SERPL-SCNC: 26 MMOL/L (ref 20–29)
CREAT SERPL-MCNC: 0.79 MG/DL (ref 0.8–1.3)
DIFFERENTIAL METHOD BLD: NORMAL
EOSINOPHIL # BLD: 0.15 K/UL (ref 0–0.8)
EOSINOPHIL NFR BLD: 3.2 % (ref 0.5–7.8)
ERYTHROCYTE [DISTWIDTH] IN BLOOD BY AUTOMATED COUNT: 12.9 % (ref 11.9–14.6)
GLUCOSE SERPL-MCNC: 103 MG/DL (ref 65–100)
HCT VFR BLD AUTO: 42.5 % (ref 35.8–46.3)
HGB BLD-MCNC: 14.3 G/DL (ref 11.7–15.4)
IMM GRANULOCYTES # BLD AUTO: 0.03 K/UL (ref 0–0.5)
IMM GRANULOCYTES NFR BLD AUTO: 0.6 % (ref 0–5)
LYMPHOCYTES # BLD: 1.21 K/UL (ref 0.5–4.6)
LYMPHOCYTES NFR BLD: 25.8 % (ref 13–44)
MCH RBC QN AUTO: 31 PG (ref 26.1–32.9)
MCHC RBC AUTO-ENTMCNC: 33.6 G/DL (ref 31.4–35)
MCV RBC AUTO: 92 FL (ref 82–102)
MONOCYTES # BLD: 0.32 K/UL (ref 0.1–1.3)
MONOCYTES NFR BLD: 6.8 % (ref 4–12)
NEUTS SEG # BLD: 2.94 K/UL (ref 1.7–8.2)
NEUTS SEG NFR BLD: 62.7 % (ref 43–78)
NRBC # BLD: 0 K/UL (ref 0–0.2)
NT PRO BNP: 259 PG/ML (ref 0–450)
PLATELET # BLD AUTO: 157 K/UL (ref 150–450)
PMV BLD AUTO: 11 FL (ref 9.4–12.3)
POTASSIUM SERPL-SCNC: 4 MMOL/L (ref 3.5–5.1)
RBC # BLD AUTO: 4.62 M/UL (ref 4.05–5.2)
SODIUM SERPL-SCNC: 144 MMOL/L (ref 133–143)
TROPONIN T SERPL HS-MCNC: 9.9 NG/L (ref 0–14)
WBC # BLD AUTO: 4.7 K/UL (ref 4.3–11.1)

## 2025-02-27 PROCEDURE — 73590 X-RAY EXAM OF LOWER LEG: CPT

## 2025-02-27 PROCEDURE — 83880 ASSAY OF NATRIURETIC PEPTIDE: CPT

## 2025-02-27 PROCEDURE — 6370000000 HC RX 637 (ALT 250 FOR IP): Performed by: NURSE PRACTITIONER

## 2025-02-27 PROCEDURE — 84484 ASSAY OF TROPONIN QUANT: CPT

## 2025-02-27 PROCEDURE — 85025 COMPLETE CBC W/AUTO DIFF WBC: CPT

## 2025-02-27 PROCEDURE — 71046 X-RAY EXAM CHEST 2 VIEWS: CPT

## 2025-02-27 PROCEDURE — 93971 EXTREMITY STUDY: CPT

## 2025-02-27 PROCEDURE — 99285 EMERGENCY DEPT VISIT HI MDM: CPT

## 2025-02-27 PROCEDURE — 93005 ELECTROCARDIOGRAM TRACING: CPT | Performed by: NURSE PRACTITIONER

## 2025-02-27 PROCEDURE — 80048 BASIC METABOLIC PNL TOTAL CA: CPT

## 2025-02-27 RX ORDER — TRAMADOL HYDROCHLORIDE 50 MG/1
50 TABLET ORAL
Status: COMPLETED | OUTPATIENT
Start: 2025-02-27 | End: 2025-02-27

## 2025-02-27 RX ORDER — TRAMADOL HYDROCHLORIDE 50 MG/1
50 TABLET ORAL EVERY 6 HOURS PRN
Qty: 12 TABLET | Refills: 0 | Status: SHIPPED | OUTPATIENT
Start: 2025-02-27 | End: 2025-03-02

## 2025-02-27 RX ADMIN — TRAMADOL HYDROCHLORIDE 50 MG: 50 TABLET ORAL at 19:32

## 2025-02-27 ASSESSMENT — PAIN SCALES - GENERAL
PAINLEVEL_OUTOF10: 8
PAINLEVEL_OUTOF10: 8
PAINLEVEL_OUTOF10: 7

## 2025-02-27 ASSESSMENT — PAIN DESCRIPTION - LOCATION
LOCATION: LEG
LOCATION: LEG

## 2025-02-27 ASSESSMENT — PAIN DESCRIPTION - DESCRIPTORS
DESCRIPTORS: SHOOTING;SHARP
DESCRIPTORS: SORE

## 2025-02-27 ASSESSMENT — PAIN - FUNCTIONAL ASSESSMENT
PAIN_FUNCTIONAL_ASSESSMENT: 0-10
PAIN_FUNCTIONAL_ASSESSMENT: 0-10

## 2025-02-27 ASSESSMENT — PAIN DESCRIPTION - ORIENTATION: ORIENTATION: LEFT

## 2025-02-27 NOTE — ED PROVIDER NOTES
Emergency Department Provider Note       PCP: Angie Moreno, APRN - NP   Age: 74 y.o.   Sex: female     DISPOSITION Decision To Discharge 02/27/2025 07:02:50 PM    ICD-10-CM    1. Left leg pain  M79.605 traMADol (ULTRAM) 50 MG tablet      2. Strain of calf muscle, left, initial encounter  S86.812A traMADol (ULTRAM) 50 MG tablet      3. Dependent edema  R60.9           Medical Decision Making     74-year-old white female with a past medical history of obesity, chronic low back pain, spinal stenosis, hyperlipidemia, depression, CAD, CHF, paroxysmal atrial fibrillation, on Eliquis therapy, presents emergency room with a chief complaint of left lower extremity edema, calf pain, and pain at the posterior left lower leg.  She states pain started yesterday.  And she noticed increased swelling in the left lower extremity as well as at the calf muscle area.  And it turned rockhard over the course of the day.  She became worried came to the emergency room for further evaluation and care.  She denies any chest pain, shortness of breath, Daphne exertion.  Denies any abdominal pain nausea vomiting or diarrhea.  Denies any erythema to the leg.  Denies any fever or chills.    Patient seen and evaluated in the emergency department.  With her extensive coronary history in the past.  Will obtain CBC, BMP, BNP, chest x-ray two-view, troponin, left tib-fib, and a venous Doppler of the left lower extremity.  Working diagnosis of CHF, dependent edema, DVT left lower extremity, gastrocnemius tear, gastrocnemius sprain strain.    Patient's labs are back.  CBC shows no white count.  No left shift.  BMP shows sodium at 144.  This is consistent with her baseline.  Rest of her BMP within normal limits.  BNP normal at 259.  Troponin is normal 9.9.  Chest x-ray shows no acute cardiopulmonary disease.  No CHF.       No acute osseous  Left tib-fib showed 3.  Does show diffuse soft tissue swelling.  And venous Doppler of the left lower

## 2025-02-27 NOTE — ED TRIAGE NOTES
Pt to the ED from home with daughter with c/o of left calf lump that she first noticed this am when she was getting out of bed. Pt states that it is sore to walk on. No warmth, area is firm to touch. No discoloration.

## 2025-02-27 NOTE — DISCHARGE INSTRUCTIONS
Ice to the area 20 minutes each hour while awake for the first 48 hours and then 4-6 times daily as needed for any pain or swelling  Wear the Ace wrap for comfort  Keep the leg elevated when at rest  May use tramadol as needed.  You can take 1 tablet every 6-8 hours as needed for severe pain only.  Try to limit the medication.  As it is narcotic can be habit-forming.  Is also known to cause constipation so make sure to use a stool softener while taking this medication.  You cannot drive a vehicle or operate machinery while taking this medication.  Do not mix with any alcohol or sedating medications.  Make sure to follow-up with your PCP this week  Return to ER if any problems

## 2025-02-28 LAB
EKG ATRIAL RATE: 62 BPM
EKG DIAGNOSIS: NORMAL
EKG P AXIS: 62 DEGREES
EKG P-R INTERVAL: 155 MS
EKG Q-T INTERVAL: 419 MS
EKG QRS DURATION: 107 MS
EKG QTC CALCULATION (BAZETT): 436 MS
EKG R AXIS: 8 DEGREES
EKG T AXIS: 49 DEGREES
EKG VENTRICULAR RATE: 65 BPM

## 2025-02-28 PROCEDURE — 93010 ELECTROCARDIOGRAM REPORT: CPT | Performed by: INTERNAL MEDICINE

## 2025-03-11 ENCOUNTER — APPOINTMENT (OUTPATIENT)
Dept: URBAN - METROPOLITAN AREA CLINIC 330 | Facility: CLINIC | Age: 75
Setting detail: DERMATOLOGY
End: 2025-03-11

## 2025-03-11 DIAGNOSIS — Z48.02 ENCOUNTER FOR REMOVAL OF SUTURES: ICD-10-CM

## 2025-03-11 PROCEDURE — ? SUTURE REMOVAL

## 2025-03-11 ASSESSMENT — LOCATION DETAILED DESCRIPTION DERM: LOCATION DETAILED: RIGHT RADIAL DORSAL HAND

## 2025-03-11 ASSESSMENT — LOCATION SIMPLE DESCRIPTION DERM: LOCATION SIMPLE: RIGHT HAND

## 2025-03-11 ASSESSMENT — LOCATION ZONE DERM: LOCATION ZONE: HAND

## 2025-04-22 ENCOUNTER — TELEPHONE (OUTPATIENT)
Dept: ORTHOPEDIC SURGERY | Age: 75
End: 2025-04-22

## 2025-04-22 NOTE — TELEPHONE ENCOUNTER
Patient would like to schedule a 2nd opinion appt of a torn meniscus. Patient says she is currently seeing Dr. Daily. Informed her that she would need to have her records faxed over to be reviewed.

## 2025-04-29 ENCOUNTER — TELEPHONE (OUTPATIENT)
Dept: ORTHOPEDIC SURGERY | Age: 75
End: 2025-04-29

## 2025-04-29 NOTE — TELEPHONE ENCOUNTER
referred this patient to [facility] for meniscus tear. Notes scanned in please review and advise.

## 2025-04-30 NOTE — TELEPHONE ENCOUNTER
I spoke with patient, she has not had any current work up of xrays or MRI.  Scheduled with Latrice Shukla for initial work up, Emanuel did agree to see if appropriate after work up is completed.

## 2025-05-08 ENCOUNTER — OFFICE VISIT (OUTPATIENT)
Dept: ORTHOPEDIC SURGERY | Age: 75
End: 2025-05-08
Payer: MEDICARE

## 2025-05-08 VITALS — HEIGHT: 67 IN | BODY MASS INDEX: 37.67 KG/M2 | WEIGHT: 240 LBS

## 2025-05-08 DIAGNOSIS — M25.562 LEFT KNEE PAIN, UNSPECIFIED CHRONICITY: Primary | ICD-10-CM

## 2025-05-08 PROCEDURE — 1123F ACP DISCUSS/DSCN MKR DOCD: CPT | Performed by: PHYSICIAN ASSISTANT

## 2025-05-08 PROCEDURE — G8399 PT W/DXA RESULTS DOCUMENT: HCPCS | Performed by: PHYSICIAN ASSISTANT

## 2025-05-08 PROCEDURE — 3017F COLORECTAL CA SCREEN DOC REV: CPT | Performed by: PHYSICIAN ASSISTANT

## 2025-05-08 PROCEDURE — 1090F PRES/ABSN URINE INCON ASSESS: CPT | Performed by: PHYSICIAN ASSISTANT

## 2025-05-08 PROCEDURE — G8417 CALC BMI ABV UP PARAM F/U: HCPCS | Performed by: PHYSICIAN ASSISTANT

## 2025-05-08 PROCEDURE — 1159F MED LIST DOCD IN RCRD: CPT | Performed by: PHYSICIAN ASSISTANT

## 2025-05-08 PROCEDURE — 1036F TOBACCO NON-USER: CPT | Performed by: PHYSICIAN ASSISTANT

## 2025-05-08 PROCEDURE — 99203 OFFICE O/P NEW LOW 30 MIN: CPT | Performed by: PHYSICIAN ASSISTANT

## 2025-05-08 PROCEDURE — G8427 DOCREV CUR MEDS BY ELIG CLIN: HCPCS | Performed by: PHYSICIAN ASSISTANT

## 2025-05-08 NOTE — PROGRESS NOTES
Name: Veronica Kennedy  YOB: 1950  Gender: female  MRN: [id number]    CC: Knee Pain (L)     HPI: Veronica Kennedy is a 74 y.o. female who presents with Knee Pain (L)    History of Present Illness  The patient presents for evaluation of knee pain.    She first experienced severe knee pain on 07/24/2024, which prompted a visit to her primary care physician, Dr. Angie Moreno, on 07/09/2024. An x-ray was performed, and she was referred to an orthopedist due to a suspected torn meniscus. She then consulted with Dr. Eladio Daily, who administered cortisone injections in 08/2024, providing some relief for a couple of months. A subsequent injection in 11/2024 proved more effective than the initial one. In 02/2025, she developed a large knot on the back of her leg, which was evaluated in the emergency room and determined not to be a blood clot. Dr. Daily diagnosed it as a ruptured Baker's cyst. Since 02/2025, she has been experiencing excruciating pain and swelling in her leg. The pain is localized to the lateral side of her knee, intensifies upon bending, and radiates down to her foot, described as a dull ache. She reports no hip pain or mechanical symptoms such as locking or instability in the knee. She has not undergone any physical therapy. She expresses a preference for non-surgical treatment options. She has been managing the pain with Tylenol and occasionally tramadol, prescribed by Dr. Moreno, but avoids tramadol due to associated dizziness.    She also reports a history of leg swelling prior to the onset of her knee issues, which was particularly pronounced during her three pregnancies. She does not use compression socks and has not taken diuretics due to urinary incontinence.     She takes care of her 2 y.o. granddaughter 2 nights and 3 days out of the week.       ROS/Meds/PSH/PMH/FH/SH: I personally reviewed the patients standard intake form.  Below are the pertinents    Tobacco:

## 2025-05-20 ENCOUNTER — TELEPHONE (OUTPATIENT)
Dept: UROLOGY | Age: 75
End: 2025-05-20

## 2025-05-20 NOTE — TELEPHONE ENCOUNTER
Pt called lvm requesting an appt with Dr. Guajardo and [employer] rep. Called pt ldvm regarding a rep stim appt with Edwina and get recommendations from Edwina and then schedule f/u with Nuria. Informed pt to call back and let me know if she is able to come 05/22

## 2025-05-21 ENCOUNTER — EVALUATION (OUTPATIENT)
Age: 75
End: 2025-05-21
Payer: MEDICARE

## 2025-05-21 DIAGNOSIS — Z74.09 IMPAIRED MOBILITY AND ENDURANCE: ICD-10-CM

## 2025-05-21 DIAGNOSIS — M25.562 LEFT KNEE PAIN, UNSPECIFIED CHRONICITY: Primary | ICD-10-CM

## 2025-05-21 DIAGNOSIS — M62.81 MUSCLE STRENGTH REDUCED: ICD-10-CM

## 2025-05-21 DIAGNOSIS — R26.9 ALTERED GAIT: ICD-10-CM

## 2025-05-21 PROCEDURE — 97162 PT EVAL MOD COMPLEX 30 MIN: CPT

## 2025-05-21 PROCEDURE — 97110 THERAPEUTIC EXERCISES: CPT

## 2025-05-21 NOTE — PROGRESS NOTES
[facility]  [address] [address]  Dept: [phone]      Physical Therapy Initial Assessment     Referring MD: Latrice Shukla PA  Diagnosis:     ICD-10-CM    1. Left knee pain, unspecified chronicity  M25.562       2. Impaired mobility and endurance  Z74.09       3. Muscle strength reduced  M62.81       4. Altered gait  R26.9          Therapy precautions: on a diuretic, bladder stimulator  Co-morbidities affecting plan of care: cervical radiculopathy, CAD, claustrophobia, depression, partial adrenalectomy, HF, GERD, lumbar radiculopathy, urge incontinence    Payor: Payor: MEDICARE /  /  /  Billing pattern: Government- total time   Total Timed Codes: 25 min, Total Treatment Time: 45 min Modifier needed: No    Episode visit count:  1     PERTINENT MEDICAL HISTORY     Past medical and surgical history:   Past Medical History:   Diagnosis Date    Acute cervical radiculopathy     Arthralgia of left shoulder region     Arthritis     fingers    Cervicalgia     Chest pain     pt denies CP or SOB    Chronic pain     right shoulder, sciatica right hip    Claustrophobia 2/21/2017    Coronary disease     Followed by [facility].    COVID-19 vaccine series completed     Pfizer vaccine completed X3 doses    Current use of long term anticoagulation     Eliquis    Depression     controlled with sertraline daily    Displacement of cervical intervertebral disc without myelopathy     Exogenous obesity     BMI 42    Extremity pain     Fibrocystic breast disease     Former cigarette smoker     H/O partial adrenalectomy     right adrenalectomy (benign tumor)    Heart failure with preserved ejection fraction (HCC)     EF of 60 - 65%.    History of bone density study 2017    Dexa 2017:  Wnl.  10 year fracture risk (FRAX score):  Any Fracture:  7.9%  Hip fracture:  0.7%    History of colonoscopy 2017    Colonoscopy current (2017 Dr. Calvin --> diverticulitis and tortuous colon, R 10 y). 2018

## 2025-05-22 ENCOUNTER — OFFICE VISIT (OUTPATIENT)
Dept: UROLOGY | Age: 75
End: 2025-05-22
Payer: MEDICARE

## 2025-05-22 DIAGNOSIS — N39.41 URGE URINARY INCONTINENCE: Primary | ICD-10-CM

## 2025-05-22 PROCEDURE — 74018 RADEX ABDOMEN 1 VIEW: CPT | Performed by: UROLOGY

## 2025-05-24 NOTE — PROGRESS NOTES
Rep stim performed today.  KUB shows interstim lead in good position and rep assessment shows it is functioning well.     Program change performed.     Samuel Quiñones M.D.    [facility]  [address] [address]  Phone: [phone]  Fax: [phone]

## 2025-05-27 RX ORDER — METOPROLOL SUCCINATE 25 MG/1
25 TABLET, EXTENDED RELEASE ORAL DAILY
Qty: 90 TABLET | Refills: 3 | Status: SHIPPED | OUTPATIENT
Start: 2025-05-27

## 2025-05-27 NOTE — TELEPHONE ENCOUNTER
Med continued lov 1/13/25 med pended for refill as below:  Requested Prescriptions     Pending Prescriptions Disp Refills    metoprolol succinate (TOPROL XL) 25 MG extended release tablet 90 tablet 3     Sig: Take 1 tablet by mouth daily

## 2025-05-30 ENCOUNTER — TREATMENT (OUTPATIENT)
Age: 75
End: 2025-05-30

## 2025-05-30 DIAGNOSIS — R26.9 ALTERED GAIT: ICD-10-CM

## 2025-05-30 DIAGNOSIS — M62.81 MUSCLE STRENGTH REDUCED: ICD-10-CM

## 2025-05-30 DIAGNOSIS — Z74.09 IMPAIRED MOBILITY AND ENDURANCE: ICD-10-CM

## 2025-05-30 DIAGNOSIS — M25.562 LEFT KNEE PAIN, UNSPECIFIED CHRONICITY: Primary | ICD-10-CM

## 2025-05-30 NOTE — PROGRESS NOTES
[facility]  [address] [address]  Dept: [phone]      Physical Therapy Daily Note     Referring MD: Latrice Shukla PA  Diagnosis:     ICD-10-CM    1. Left knee pain, unspecified chronicity  M25.562       2. Impaired mobility and endurance  Z74.09       3. Muscle strength reduced  M62.81       4. Altered gait  R26.9          Therapy precautions: on a diuretic, bladder stimulator  Co-morbidities affecting plan of care: cervical radiculopathy, CAD, claustrophobia, depression, partial adrenalectomy, HF, GERD, lumbar radiculopathy, urge incontinence    Payor: Payor: MEDICARE /  /  /  Billing pattern: Government- total time   Total Timed Codes: 47 min, Total Treatment Time: 49 min Modifier needed: No    Episode visit count:  2     PERTINENT MEDICAL HISTORY     Past medical and surgical history:   Past Medical History:   Diagnosis Date    Acute cervical radiculopathy     Arthralgia of left shoulder region     Arthritis     fingers    Cervicalgia     Chest pain     pt denies CP or SOB    Chronic pain     right shoulder, sciatica right hip    Claustrophobia 2/21/2017    Coronary disease     Followed by [facility].    COVID-19 vaccine series completed     Pfizer vaccine completed X3 doses    Current use of long term anticoagulation     Eliquis    Depression     controlled with sertraline daily    Displacement of cervical intervertebral disc without myelopathy     Exogenous obesity     BMI 42    Extremity pain     Fibrocystic breast disease     Former cigarette smoker     H/O partial adrenalectomy     right adrenalectomy (benign tumor)    Heart failure with preserved ejection fraction (HCC)     EF of 60 - 65%.    History of bone density study 2017    Dexa 2017:  Wnl.  10 year fracture risk (FRAX score):  Any Fracture:  7.9%  Hip fracture:  0.7%    History of colonoscopy 2017    Colonoscopy current (2017 Dr. Calvin --> diverticulitis and tortuous colon, R 10 y). 2018 f/u

## 2025-06-25 ENCOUNTER — TREATMENT (OUTPATIENT)
Age: 75
End: 2025-06-25
Payer: MEDICARE

## 2025-06-25 DIAGNOSIS — M25.562 LEFT KNEE PAIN, UNSPECIFIED CHRONICITY: Primary | ICD-10-CM

## 2025-06-25 DIAGNOSIS — Z74.09 IMPAIRED MOBILITY AND ENDURANCE: ICD-10-CM

## 2025-06-25 DIAGNOSIS — M62.81 MUSCLE STRENGTH REDUCED: ICD-10-CM

## 2025-06-25 DIAGNOSIS — R26.9 ALTERED GAIT: ICD-10-CM

## 2025-06-25 PROCEDURE — 97140 MANUAL THERAPY 1/> REGIONS: CPT

## 2025-06-25 PROCEDURE — 97110 THERAPEUTIC EXERCISES: CPT

## 2025-06-25 NOTE — PROGRESS NOTES
[facility]  [address] [facility] [address] [facility] [address]  Dept: [phone]      Physical Therapy Daily Note     Referring MD: Latrice Shukla PA  Diagnosis:     ICD-10-CM    1. Left knee pain, unspecified chronicity  M25.562       2. Impaired mobility and endurance  Z74.09       3. Muscle strength reduced  M62.81       4. Altered gait  R26.9            Therapy precautions:on a diuretic, bladder stimulator  Co-morbidities affecting plan of care: cervical radiculopathy, CAD, claustrophobia, depression, partial adrenalectomy, HF, GERD, lumbar radiculopathy, urge incontinence      PERTINENT MEDICAL HISTORY     Past medical and surgical history:   Past Medical History:   Diagnosis Date    Acute cervical radiculopathy     Arthralgia of left shoulder region     Arthritis     fingers    Cervicalgia     Chest pain     pt denies CP or SOB    Chronic pain     right shoulder, sciatica right hip    Claustrophobia 2/21/2017    Coronary disease     Followed by [facility].    COVID-19 vaccine series completed     Pfizer vaccine completed X3 doses    Current use of long term anticoagulation     Eliquis    Depression     controlled with sertraline daily    Displacement of cervical intervertebral disc without myelopathy     Exogenous obesity     BMI 42    Extremity pain     Fibrocystic breast disease     Former cigarette smoker     H/O partial adrenalectomy     right adrenalectomy (benign tumor)    Heart failure with preserved ejection fraction (HCC)     EF of 60 - 65%.    History of bone density study 2017    Dexa 2017:  Wnl.  10 year fracture risk (FRAX score):  Any Fracture:  7.9%  Hip fracture:  0.7%    History of colonoscopy 2017    Colonoscopy current (2017 Dr. Calvin --> diverticulitis and tortuous colon, R 10 y). 2018 f/u ([place] noncardiac CP, likely esophageal spasm).  Pt cxd EGD. 11/2019 [facility], Farideh Blount, APRN.  -->resched EGD w/ Dr. Calvin (see \"[facility] media\" 2/7/2020 \"Referral order/[facility]\"

## 2025-06-30 ENCOUNTER — TELEPHONE (OUTPATIENT)
Dept: ORTHOPEDIC SURGERY | Age: 75
End: 2025-06-30

## 2025-06-30 NOTE — TELEPHONE ENCOUNTER
LVM for patient to call back and amy an appt with KB for her shoulder. Per request from PT (Kt) in note below.

## 2025-06-30 NOTE — TELEPHONE ENCOUNTER
----- Message from SANIYA Ghosh sent at 6/25/2025  4:04 PM EDT -----  Regarding: FW: new shoulder pain  Can we get her in  ----- Message -----  From: Kt Judge, PT  Sent: 6/25/2025   3:48 PM EDT  To: SANIYA Ghosh  Subject: new shoulder pain                                Hey,  This is the patient I just had you come say hey to. She's the one I saw with you when I was shadowing that had insidious onset swelling in the L knee last summer.    YAMILETH: she was doing TRX mini squats and the day after, she felt pain at anterior shoulder and traveling down lateral brachium. Pain with all motions, especially reaching out to side, across body, driving. Never had injury/pain in L shoulder before    I think the L knee will probably be another month of PT (she was out of town for a few vacations), just slow progress from deconditioning / overdoing it walking [place] and [place] the beach. If you think she should do PT on the shoulder, we can switch over to that case when the knee wraps up?      Thanks!

## 2025-07-02 ENCOUNTER — TREATMENT (OUTPATIENT)
Age: 75
End: 2025-07-02
Payer: MEDICARE

## 2025-07-02 DIAGNOSIS — M25.562 LEFT KNEE PAIN, UNSPECIFIED CHRONICITY: Primary | ICD-10-CM

## 2025-07-02 DIAGNOSIS — R26.9 ALTERED GAIT: ICD-10-CM

## 2025-07-02 DIAGNOSIS — M62.81 MUSCLE STRENGTH REDUCED: ICD-10-CM

## 2025-07-02 DIAGNOSIS — Z74.09 IMPAIRED MOBILITY AND ENDURANCE: ICD-10-CM

## 2025-07-02 PROCEDURE — 97110 THERAPEUTIC EXERCISES: CPT

## 2025-07-02 PROCEDURE — 97530 THERAPEUTIC ACTIVITIES: CPT

## 2025-07-02 NOTE — PROGRESS NOTES
[facility] PT [facility] ORTHOPAEDICS  [address] [facility] [facility] [address] [address]  Dept: [phone]      Physical Therapy Progress Report     Referring MD: Latrice Shukla PA  Diagnosis:     ICD-10-CM    1. Left knee pain, unspecified chronicity  M25.562       2. Impaired mobility and endurance  Z74.09       3. Muscle strength reduced  M62.81       4. Altered gait  R26.9            Therapy precautions:on a diuretic, bladder stimulator  Co-morbidities affecting plan of care: cervical radiculopathy, CAD, claustrophobia, depression, partial adrenalectomy, HF, GERD, lumbar radiculopathy, urge incontinence      PERTINENT MEDICAL HISTORY     Past medical and surgical history:   Past Medical History:   Diagnosis Date    Acute cervical radiculopathy     Arthralgia of left shoulder region     Arthritis     fingers    Cervicalgia     Chest pain     pt denies CP or SOB    Chronic pain     right shoulder, sciatica right hip    Claustrophobia 2/21/2017    Coronary disease     Followed by [facility].    COVID-19 vaccine series completed     Pfizer vaccine completed X3 doses    Current use of long term anticoagulation     Eliquis    Depression     controlled with sertraline daily    Displacement of cervical intervertebral disc without myelopathy     Exogenous obesity     BMI 42    Extremity pain     Fibrocystic breast disease     Former cigarette smoker     H/O partial adrenalectomy     right adrenalectomy (benign tumor)    Heart failure with preserved ejection fraction (HCC)     EF of 60 - 65%.    History of bone density study 2017    Dexa 2017:  Wnl.  10 year fracture risk (FRAX score):  Any Fracture:  7.9%  Hip fracture:  0.7%    History of colonoscopy 2017    Colonoscopy current (2017 Dr. Calvin --> diverticulitis and tortuous colon, R 10 y). 2018 f/u ([place] noncardiac CP, likely esophageal spasm).  Pt cxd EGD. [facility], Farideh Blount, APRN.  -->resched EGD w/ Dr. Calvin (see \"CC media\" 2/7/2020 \"Referral order/GI

## 2025-07-08 NOTE — PROGRESS NOTES
Name: Veronica Kennedy  YOB: 1950  Gender: female  MRN: [id number]    CC: Shoulder Pain (L)     HPI: Veronica Kennedy is a 75 y.o. female who presents with Shoulder Pain (L)    History of Present Illness  The patient presents for evaluation of shoulder pain.    I have seen her before for her left knee but have not seen her for this shoulder. She experienced a popping sensation in her shoulder while performing squats, which was followed by persistent pain. The discomfort is not limited to movement and extends from her shoulder to her elbow, particularly noticeable when she lies down at night. She reports no bruising. Her current medication regimen includes Tylenol, and she resorts to tramadol only when the pain becomes severe. She has been using a compound cream for her knees, which she also applies to her shoulder. Pain is in the anterior shoulder and into her bicep. She has pain with all motions, reaching out to side, reaching across body, and driving. She is here to be evaluated for this today.     She has been more active in the last couple of days and has swelling in her knee today. She is taking care of her grandkids. She is undergoing physical therapy with [facility] for her knee and it is working well.    She has atrial fibrillation.     ROS/Meds/PSH/PMH/FH/SH: I personally reviewed the patients standard intake form.  Below are the pertinents    Tobacco:  reports that she quit smoking about 34 years ago. Her smoking use included cigarettes. She started smoking about 44 years ago. She has a 2.5 pack-year smoking history. She has never used smokeless tobacco.  Diabetes: none  Other: Heart failure, A-fib, GERD, overactive bladder, depression, hyperlipidemia, history of back pain     Physical Examination:  General: no acute distress  Lungs: breathing easily  CV: regular rhythm by pulse  Left Shoulder: Full shoulder range of motion present.  Positive impingement signs.  Tender palpate along

## 2025-07-09 ENCOUNTER — OFFICE VISIT (OUTPATIENT)
Dept: ORTHOPEDIC SURGERY | Age: 75
End: 2025-07-09

## 2025-07-09 ENCOUNTER — TREATMENT (OUTPATIENT)
Age: 75
End: 2025-07-09
Payer: MEDICARE

## 2025-07-09 VITALS — WEIGHT: 278 LBS | BODY MASS INDEX: 43.63 KG/M2 | HEIGHT: 67 IN

## 2025-07-09 DIAGNOSIS — M75.42 SHOULDER IMPINGEMENT SYNDROME, LEFT: ICD-10-CM

## 2025-07-09 DIAGNOSIS — R26.9 ALTERED GAIT: ICD-10-CM

## 2025-07-09 DIAGNOSIS — M62.81 MUSCLE STRENGTH REDUCED: ICD-10-CM

## 2025-07-09 DIAGNOSIS — M25.562 LEFT KNEE PAIN, UNSPECIFIED CHRONICITY: Primary | ICD-10-CM

## 2025-07-09 DIAGNOSIS — Z74.09 IMPAIRED MOBILITY AND ENDURANCE: ICD-10-CM

## 2025-07-09 DIAGNOSIS — M25.512 LEFT SHOULDER PAIN, UNSPECIFIED CHRONICITY: Primary | ICD-10-CM

## 2025-07-09 DIAGNOSIS — M75.22 BICEPS TENDINITIS OF LEFT UPPER EXTREMITY: ICD-10-CM

## 2025-07-09 PROCEDURE — 97110 THERAPEUTIC EXERCISES: CPT

## 2025-07-09 PROCEDURE — 97530 THERAPEUTIC ACTIVITIES: CPT

## 2025-07-09 RX ORDER — TRIAMCINOLONE ACETONIDE 40 MG/ML
40 INJECTION, SUSPENSION INTRA-ARTICULAR; INTRAMUSCULAR ONCE
Status: COMPLETED | OUTPATIENT
Start: 2025-07-09 | End: 2025-07-09

## 2025-07-09 RX ADMIN — TRIAMCINOLONE ACETONIDE 80 MG: 40 INJECTION, SUSPENSION INTRA-ARTICULAR; INTRAMUSCULAR at 15:09

## 2025-07-09 NOTE — PROGRESS NOTES
[facility] PT [facility] ORTHOPAEDICS  [address] [address]  Dept: [phone]      Physical Therapy Daily Note     Referring MD: Latrice Shukla PA  Diagnosis:     ICD-10-CM    1. Left knee pain, unspecified chronicity  M25.562       2. Impaired mobility and endurance  Z74.09       3. Muscle strength reduced  M62.81       4. Altered gait  R26.9            Therapy precautions:on a diuretic, bladder stimulator  Co-morbidities affecting plan of care: cervical radiculopathy, CAD, claustrophobia, depression, partial adrenalectomy, HF, GERD, lumbar radiculopathy, urge incontinence      PERTINENT MEDICAL HISTORY     Past medical and surgical history:   Past Medical History:   Diagnosis Date    Acute cervical radiculopathy     Arthralgia of left shoulder region     Arthritis     fingers    Cervicalgia     Chest pain     pt denies CP or SOB    Chronic pain     right shoulder, sciatica right hip    Claustrophobia 2/21/2017    Coronary disease     Followed by [facility].    COVID-19 vaccine series completed     Pfizer vaccine completed X3 doses    Current use of long term anticoagulation     Eliquis    Depression     controlled with sertraline daily    Displacement of cervical intervertebral disc without myelopathy     Exogenous obesity     BMI 42    Extremity pain     Fibrocystic breast disease     Former cigarette smoker     H/O partial adrenalectomy     right adrenalectomy (benign tumor)    Heart failure with preserved ejection fraction (HCC)     EF of 60 - 65%.    History of bone density study 2017    Dexa 2017:  Wnl.  10 year fracture risk (FRAX score):  Any Fracture:  7.9%  Hip fracture:  0.7%    History of colonoscopy 2017    Colonoscopy current (2017 Dr. Calvin --> diverticulitis and tortuous colon, R 10 y). 2018 f/u ([place] noncardiac CP, likely esophageal spasm).  Pt cxd EGD. 11/[facility], Farideh Blount, APRN.  -->resched EGD w/ Dr. Calvin (see \"CC media\" 2/7/2020 \"Referral order/[facility]\"

## 2025-07-20 NOTE — PROGRESS NOTES
[facility] CARDIOLOGY Follow Up                 Reason for Visit: Chronic HFpEF    Subjective:     Patient is a 75 y.o. female with a PMH of chronic HFpEF, atrial fibrillation status post ablation, atrial flutter status post ablation, hyperlipidemia, and coronary artery calcification noted on CAT scan who presents for follow-up.  The patient was last seen in January 2025.  She was seen for preoperative evaluation.  She had a TTE in February 2023 that was noted to demonstrate a normal EF. The patient denies angina and dyspnea.  She reports infrequent palpitations, that are minimally symptomatic and well tolerated, in the last 6 months.      Past Medical History:   Diagnosis Date    Acute cervical radiculopathy     Arthralgia of left shoulder region     Arthritis     fingers    Cervicalgia     Chest pain     pt denies CP or SOB    Chronic pain     right shoulder, sciatica right hip    Claustrophobia 2/21/2017    Coronary disease     Followed by [facility].    COVID-19 vaccine series completed     Pfizer vaccine completed X3 doses    Current use of long term anticoagulation     Eliquis    Depression     controlled with sertraline daily    Displacement of cervical intervertebral disc without myelopathy     Exogenous obesity     BMI 42    Extremity pain     Fibrocystic breast disease     Former cigarette smoker     H/O partial adrenalectomy     right adrenalectomy (benign tumor)    Heart failure with preserved ejection fraction (HCC)     EF of 60 - 65%.    History of bone density study 2017    Dexa 2017:  Wnl.  10 year fracture risk (FRAX score):  Any Fracture:  7.9%  Hip fracture:  0.7%    History of colonoscopy 2017    Colonoscopy current (2017 Dr. Calvin --> diverticulitis and tortuous colon, R 10 y). 2018 f/u ([place] noncardiac CP, likely esophageal spasm).  Pt cxd EGD. [facility], Farideh Blount, APRN.  -->resched EGD w/ Dr. Calvin (see \"CC media\" 2/7/2020 \"Referral order/[facility]\" office note)      History of

## 2025-07-22 ENCOUNTER — OFFICE VISIT (OUTPATIENT)
Age: 75
End: 2025-07-22
Payer: MEDICARE

## 2025-07-22 VITALS
HEIGHT: 67 IN | DIASTOLIC BLOOD PRESSURE: 70 MMHG | HEART RATE: 76 BPM | BODY MASS INDEX: 42.22 KG/M2 | SYSTOLIC BLOOD PRESSURE: 124 MMHG | WEIGHT: 269 LBS

## 2025-07-22 DIAGNOSIS — E78.5 HYPERLIPIDEMIA, UNSPECIFIED HYPERLIPIDEMIA TYPE: ICD-10-CM

## 2025-07-22 DIAGNOSIS — R00.2 PALPITATIONS: ICD-10-CM

## 2025-07-22 DIAGNOSIS — Z86.79 STATUS POST ABLATION OF ATRIAL FIBRILLATION: ICD-10-CM

## 2025-07-22 DIAGNOSIS — Z98.890 STATUS POST ABLATION OF ATRIAL FLUTTER: ICD-10-CM

## 2025-07-22 DIAGNOSIS — Z86.79 STATUS POST ABLATION OF ATRIAL FLUTTER: ICD-10-CM

## 2025-07-22 DIAGNOSIS — Z98.890 STATUS POST ABLATION OF ATRIAL FIBRILLATION: ICD-10-CM

## 2025-07-22 DIAGNOSIS — I25.10 CORONARY ARTERY CALCIFICATION SEEN ON CAT SCAN: ICD-10-CM

## 2025-07-22 DIAGNOSIS — I50.32 CHRONIC HEART FAILURE WITH PRESERVED EJECTION FRACTION (HFPEF) (HCC): Primary | ICD-10-CM

## 2025-07-22 PROCEDURE — G8399 PT W/DXA RESULTS DOCUMENT: HCPCS | Performed by: INTERNAL MEDICINE

## 2025-07-22 PROCEDURE — 1123F ACP DISCUSS/DSCN MKR DOCD: CPT | Performed by: INTERNAL MEDICINE

## 2025-07-22 PROCEDURE — G8428 CUR MEDS NOT DOCUMENT: HCPCS | Performed by: INTERNAL MEDICINE

## 2025-07-22 PROCEDURE — G8417 CALC BMI ABV UP PARAM F/U: HCPCS | Performed by: INTERNAL MEDICINE

## 2025-07-22 PROCEDURE — 99214 OFFICE O/P EST MOD 30 MIN: CPT | Performed by: INTERNAL MEDICINE

## 2025-07-22 PROCEDURE — 1036F TOBACCO NON-USER: CPT | Performed by: INTERNAL MEDICINE

## 2025-07-22 PROCEDURE — 1126F AMNT PAIN NOTED NONE PRSNT: CPT | Performed by: INTERNAL MEDICINE

## 2025-07-22 PROCEDURE — 1090F PRES/ABSN URINE INCON ASSESS: CPT | Performed by: INTERNAL MEDICINE

## 2025-07-22 RX ORDER — METOPROLOL SUCCINATE 25 MG/1
25 TABLET, EXTENDED RELEASE ORAL DAILY
Qty: 90 TABLET | Refills: 3 | Status: SHIPPED | OUTPATIENT
Start: 2025-07-22

## 2025-07-27 ENCOUNTER — PATIENT MESSAGE (OUTPATIENT)
Age: 75
End: 2025-07-27

## 2025-07-28 ENCOUNTER — TELEPHONE (OUTPATIENT)
Age: 75
End: 2025-07-28

## 2025-07-28 NOTE — TELEPHONE ENCOUNTER
Luis Huerta MD  You13 minutes ago (12:53 PM)       A nurse visit for an ECG is a good next step to evaluate the irregular heartbeat and provide more clarity.

## 2025-07-28 NOTE — TELEPHONE ENCOUNTER
Triage informed pt of Dr. Huerta's response and scheduled appt for EKG 7/29/25 at 2:00 at [facility]. Pt confirms appt date, time, and location.

## 2025-07-28 NOTE — TELEPHONE ENCOUNTER
Veronica Kennedy \"Reema\" to [facility] Cardiology Clinical Staff (supporting Luis Huerta MD)        7/27/25  2:25 PM  I saw Dr Huerta this past Thursday.  I have starting having irregular heart beat since Friday.  I wanted to see if he needed to see me again.  This has had me feeling dizzy and tired.  Please let me know . Today is Sunday, July 27, 2025     Thank you,  Veronica Kennedy  1950

## 2025-07-28 NOTE — TELEPHONE ENCOUNTER
Pt c/o  irregular, skipping heartbeat since Friday.   Does not feel like afib, but pt states she does not feel right.  Denies CP. C/o some SOB.  She will check VS and call back with results.    12:40 pm /80  HR 82-95 bpm  States HR is usually 67 bpm

## 2025-08-22 ENCOUNTER — TELEPHONE (OUTPATIENT)
Age: 75
End: 2025-08-22

## (undated) DEVICE — STANDARD HYPODERMIC NEEDLE,POLYPROPYLENE HUB: Brand: MONOJECT

## (undated) DEVICE — CATHETER ABLAT 8FR L115CM 1-6-2MM SPC TIP 3.5MM DF CRV

## (undated) DEVICE — SUTURE PDS II SZ 2-0 L27IN ABSRB VLT L26MM CT-2 1/2 CIR Z333H

## (undated) DEVICE — CATHETER US GE COMPATIBILITY SOUNDSTAR ECO 10FR

## (undated) DEVICE — SUTURE MCRYL SZ 4-0 L27IN ABSRB UD L19MM PS-2 1/2 CIR PRIM Y426H

## (undated) DEVICE — PINNACLE INTRODUCER SHEATH: Brand: PINNACLE

## (undated) DEVICE — PINNACLE TIF INTRODUCER SHEATH: Brand: PINNACLE

## (undated) DEVICE — C-ARM: Brand: UNBRANDED

## (undated) DEVICE — 48" PROBE COVER W/GEL, ULTRASOUND, STERILE: Brand: SITE-RITE

## (undated) DEVICE — INTRODUCER SHTH SL1 0.032 IN 8.5 FRX63 CM FAST-CATH

## (undated) DEVICE — CATHETER EP 7FR L115CM 2-8-2MM SPC TIP 2MM 10 ELECTRD FJ

## (undated) DEVICE — PROGRAMMER COMMUNICATION W/HANDSET F/SACRAL NERVE STIMULATORS

## (undated) DEVICE — PROGRAMMER SMART COMM W/HANDSET F/SACRAL NRVE STIMULATORS

## (undated) DEVICE — MINOR SPLIT GENERAL: Brand: MEDLINE INDUSTRIES, INC.

## (undated) DEVICE — SUTURE MONOCRYL SZ 4-0 L27IN ABSRB UD L19MM PS-2 1/2 CIR PRIM Y426H

## (undated) DEVICE — PATCH REF EXT FOR CARTO 3 SYS (EA = 6 PACKS)

## (undated) DEVICE — NEUROSTIMULATOR EXT SM LTWT SGL BTTN H2O RESIST WIRELESS

## (undated) DEVICE — SYSTEM CLOSURE 6-12 FR VEN VASC VASCADE MVP

## (undated) DEVICE — CATHETER EP 7FR L115CM 2-8-2MM SPC TIP 2MM 10 ELECTRD D CRV

## (undated) DEVICE — SUTURE CHROMIC GUT SZ 2-0 L27IN ABSRB BRN L26MM SH 1/2 CIR G123H

## (undated) DEVICE — TUBING PMP FOR CARTO SYS SMARTABLATE

## (undated) DEVICE — NEEDLE HYPO 25GA L1.5IN BLU POLYPR HUB S STL REG BVL STR

## (undated) DEVICE — SOLUTION IRRIG 1000ML H2O STRL BLT

## (undated) DEVICE — CATHETER MAP 7FR L115CM 2-6-2 SPC D CRV 22 ELECTRD PENTARAY

## (undated) DEVICE — 18G NG KIT WITH 96IN PROBE COVER (10 PK): Brand: SITE-RITE

## (undated) DEVICE — 3M™ TEGADERM™ TRANSPARENT FILM DRESSING FRAME STYLE, 1624W, 2-3/8 IN X 2-3/4 IN (6 CM X 7 CM), 100/CT 4CT/CASE: Brand: 3M™ TEGADERM™

## (undated) DEVICE — DERMABOND SKIN ADH 0.7ML -- DERMABOND ADVANCED 12/BX

## (undated) DEVICE — LIQUIBAND RAPID ADHESIVE 36/CS 0.8ML: Brand: MEDLINE

## (undated) DEVICE — Device: Brand: NRG TRANSSEPTAL NEEDLE

## (undated) DEVICE — SOLUTION IRRIG 1000ML STRL H2O USP PLAS POUR BTL

## (undated) DEVICE — DRAPE PT ISOLATN 130 IN X 96 IN

## (undated) DEVICE — SHEATH GUID 11.5X8.5FR L71MM M CRV L22MM BIDIR STEER CARTO

## (undated) DEVICE — SUT PROL 2-0 30IN SH BLU --

## (undated) DEVICE — PRESSURE MONITORING SET: Brand: TRUWAVE